# Patient Record
Sex: FEMALE | Race: WHITE | NOT HISPANIC OR LATINO | Employment: FULL TIME | URBAN - METROPOLITAN AREA
[De-identification: names, ages, dates, MRNs, and addresses within clinical notes are randomized per-mention and may not be internally consistent; named-entity substitution may affect disease eponyms.]

---

## 2017-05-03 ENCOUNTER — TRANSCRIBE ORDERS (OUTPATIENT)
Dept: ADMINISTRATIVE | Facility: HOSPITAL | Age: 52
End: 2017-05-03

## 2017-05-03 DIAGNOSIS — Z12.39 SCREENING FOR MALIGNANT NEOPLASM OF BREAST: Primary | ICD-10-CM

## 2017-05-17 DIAGNOSIS — Z12.31 ENCOUNTER FOR SCREENING MAMMOGRAM FOR MALIGNANT NEOPLASM OF BREAST: ICD-10-CM

## 2017-06-01 ENCOUNTER — HOSPITAL ENCOUNTER (OUTPATIENT)
Dept: MAMMOGRAPHY | Facility: HOSPITAL | Age: 52
Discharge: HOME | End: 2017-06-01
Payer: COMMERCIAL

## 2017-06-01 DIAGNOSIS — Z12.31 ENCOUNTER FOR SCREENING MAMMOGRAM FOR MALIGNANT NEOPLASM OF BREAST: ICD-10-CM

## 2017-06-01 PROCEDURE — G0202 SCR MAMMO BI INCL CAD: HCPCS

## 2017-06-09 ENCOUNTER — ALLSCRIPTS OFFICE VISIT (OUTPATIENT)
Dept: OTHER | Facility: OTHER | Age: 52
End: 2017-06-09

## 2018-01-12 VITALS
HEIGHT: 66 IN | WEIGHT: 187 LBS | BODY MASS INDEX: 30.05 KG/M2 | DIASTOLIC BLOOD PRESSURE: 72 MMHG | SYSTOLIC BLOOD PRESSURE: 130 MMHG

## 2018-03-28 ENCOUNTER — TELEPHONE (OUTPATIENT)
Dept: OBGYN CLINIC | Facility: CLINIC | Age: 53
End: 2018-03-28

## 2018-03-28 NOTE — TELEPHONE ENCOUNTER
Spoke with patient,  bleeding for the past few days   Last menses was 11/2016-advised evaluation, scheduled for 3/30/18

## 2018-03-30 ENCOUNTER — OFFICE VISIT (OUTPATIENT)
Dept: OBGYN CLINIC | Facility: CLINIC | Age: 53
End: 2018-03-30
Payer: COMMERCIAL

## 2018-03-30 VITALS
HEIGHT: 66 IN | DIASTOLIC BLOOD PRESSURE: 84 MMHG | BODY MASS INDEX: 29.77 KG/M2 | WEIGHT: 185.2 LBS | SYSTOLIC BLOOD PRESSURE: 130 MMHG

## 2018-03-30 DIAGNOSIS — N95.0 POSTMENOPAUSAL BLEEDING: Primary | ICD-10-CM

## 2018-03-30 PROBLEM — R92.30 DENSE BREAST TISSUE: Status: ACTIVE | Noted: 2017-06-09

## 2018-03-30 PROBLEM — R92.2 DENSE BREAST TISSUE: Status: ACTIVE | Noted: 2017-06-09

## 2018-03-30 PROCEDURE — 88305 TISSUE EXAM BY PATHOLOGIST: CPT | Performed by: PATHOLOGY

## 2018-03-30 PROCEDURE — 88342 IMHCHEM/IMCYTCHM 1ST ANTB: CPT | Performed by: PATHOLOGY

## 2018-03-30 PROCEDURE — 88341 IMHCHEM/IMCYTCHM EA ADD ANTB: CPT | Performed by: PATHOLOGY

## 2018-03-30 PROCEDURE — 58100 BIOPSY OF UTERUS LINING: CPT | Performed by: NURSE PRACTITIONER

## 2018-03-30 PROCEDURE — 99213 OFFICE O/P EST LOW 20 MIN: CPT | Performed by: NURSE PRACTITIONER

## 2018-03-30 RX ORDER — VITAMIN B COMPLEX
TABLET ORAL
COMMUNITY

## 2018-03-30 NOTE — PROGRESS NOTES
Endometrial biopsy  Date/Time: 3/30/2018 3:23 PM  Performed by: Truong Soto  Authorized by: Truong Soto     Consent:     Consent obtained:  Verbal and written    Consent given by:  Patient    Procedural risks discussed:  Bleeding, damage to other organs, failure rate, infection, possible continued pain and repeat procedure    Patient questions answered: yes      Patient agrees, verbalizes understanding, and wants to proceed: yes      Instructions and paperwork completed: yes    Indication:     Indications: Post-menopausal bleeding      Chronicity of post-menopausal bleeding:  New    Progression of post-menopausal bleeding:  Unable to specify  Pre-procedure:     Pre-procedure timeout performed: yes    Procedure:     Procedure: endometrial biopsy with Pipelle      A bivalve speculum was placed in the vagina: yes      Cervix cleaned and prepped: yes      The cervix was dilated: no      Uterus sounded: yes      Uterus sound depth (cm):  7 5    Specimen collected: specimen collected and sent to pathology      Patient tolerated procedure well with no complications: yes    Findings:     Uterus size:  Non-gravid    Cervix: normal      Adnexa: normal    Comments:      Endometrial biopsy was advised for postmenopausal bleeding  Risks/benefits were discussed at length and all questions were answered  Written and verbal consent were obtained  Allergies were confirmed and time out performed  The patient was positioned in lithotomy and spec was inserted  The cervix was cleansed with betadine solution x3  Endo pipelle was inserted and a large amount of tissue was obtained in three sweeps  Hemostasis was observed post-procedure and all instruments were removed  The patient tolerated well  She is aware of post-procedure symptoms to report  We will follow up when all results are available

## 2018-03-30 NOTE — ASSESSMENT & PLAN NOTE
This patient presents with c/o  bleeding  Discussed common causes of  bleeding incl thyroid dysfunction, intracavitary defect, hyperplasia and rarely malignancy  Advised TSH, pelvic US and EMB  The patient agrees to plan and EMB was collected today  Will advise with all results when available  Discussed possible recommendation for D&C/hysteroscopy  The patient verbalizes understanding and is aware of sx to report

## 2018-03-30 NOTE — PROGRESS NOTES
Assessment/Plan:    Postmenopausal bleeding  This patient presents with c/o  bleeding  Discussed common causes of  bleeding incl thyroid dysfunction, intracavitary defect, hyperplasia and rarely malignancy  Advised TSH, pelvic US and EMB  The patient agrees to plan and EMB was collected today  Will advise with all results when available  Discussed possible recommendation for D&C/hysteroscopy  The patient verbalizes understanding and is aware of sx to report  Diagnoses and all orders for this visit:    Postmenopausal bleeding  -     TSH, 3rd generation with T4 reflex; Future  -     US pelvis limited non OB; Future  -     Tissue Exam    Other orders  -     Coenzyme Q10 (COQ10) 100 MG CAPS; Take by mouth  -     hydrocortisone (WESTCORT) 0 2 % cream; Apply topically          Subjective:      Patient ID: Suri Khan is a 47 y o  female  This patient presents with c/o  bleeding  LMP was 11/2016  No bleeding/spotting since  Two days prior to onset of bleeding she noted cramping similar to past PMS symptoms  Bleeding is dark brown and mucosy  She denies pelvic pain, abn discharge or other acute gyn complaints  The following portions of the patient's history were reviewed and updated as appropriate: allergies, current medications, past family history, past medical history, past social history, past surgical history and problem list     Review of Systems   Constitutional: Negative  HENT: Negative  Eyes: Negative  Respiratory: Negative  Cardiovascular: Negative  Gastrointestinal: Negative  Endocrine: Negative  Genitourinary: Positive for vaginal bleeding  Negative for dysuria, hematuria, pelvic pain and vaginal discharge  Musculoskeletal: Negative  Skin: Negative  Allergic/Immunologic: Negative  Neurological: Negative  Hematological: Negative  Psychiatric/Behavioral: Negative            Objective:      /84 (BP Location: Left arm)   Ht 5' 5 5" (7 324 m)   Wt 84 kg (185 lb 3 2 oz)   BMI 30 35 kg/m²          Physical Exam   Constitutional: She is oriented to person, place, and time  She appears well-developed and well-nourished  HENT:   Head: Normocephalic and atraumatic  Eyes: Pupils are equal, round, and reactive to light  Neck: Normal range of motion  Pulmonary/Chest: Effort normal    Abdominal: Hernia confirmed negative in the right inguinal area and confirmed negative in the left inguinal area  Genitourinary: Rectum normal and uterus normal  There is no rash, tenderness, lesion or injury on the right labia  There is no rash, tenderness, lesion or injury on the left labia  Cervix exhibits no motion tenderness, no discharge and no friability  Right adnexum displays no mass, no tenderness and no fullness  Left adnexum displays no mass, no tenderness and no fullness  There is bleeding in the vagina  No erythema or tenderness in the vagina  No vaginal discharge found  Musculoskeletal: Normal range of motion  Lymphadenopathy:        Right: No inguinal adenopathy present  Left: No inguinal adenopathy present  Neurological: She is alert and oriented to person, place, and time  Skin: Skin is warm and dry  Psychiatric: She has a normal mood and affect   Her behavior is normal  Judgment and thought content normal

## 2018-03-31 LAB
T4 FREE SERPL-MCNC: 1 NG/DL (ref 0.8–1.8)
TSH SERPL-ACNC: 0.32 MIU/L

## 2018-04-04 ENCOUNTER — TELEPHONE (OUTPATIENT)
Dept: OBGYN CLINIC | Facility: CLINIC | Age: 53
End: 2018-04-04

## 2018-04-04 ENCOUNTER — HOSPITAL ENCOUNTER (OUTPATIENT)
Dept: RADIOLOGY | Facility: HOSPITAL | Age: 53
Discharge: HOME | End: 2018-04-04
Payer: COMMERCIAL

## 2018-04-04 DIAGNOSIS — N95.0 POSTMENOPAUSAL BLEEDING: ICD-10-CM

## 2018-04-04 PROCEDURE — 76856 US EXAM PELVIC COMPLETE: CPT

## 2018-04-04 PROCEDURE — 76830 TRANSVAGINAL US NON-OB: CPT

## 2018-04-04 NOTE — TELEPHONE ENCOUNTER
Spoke with pt - she is aware of b/w results and that she needs to follow up with her PCP  Advised once we have the EB results, she will be notified

## 2018-04-06 ENCOUNTER — HOSPITAL ENCOUNTER (OUTPATIENT)
Dept: RADIOLOGY | Facility: HOSPITAL | Age: 53
Discharge: HOME | End: 2018-04-06
Payer: COMMERCIAL

## 2018-04-06 ENCOUNTER — OFFICE VISIT (OUTPATIENT)
Dept: FAMILY MEDICINE CLINIC | Facility: CLINIC | Age: 53
End: 2018-04-06
Payer: COMMERCIAL

## 2018-04-06 VITALS
HEIGHT: 65 IN | DIASTOLIC BLOOD PRESSURE: 78 MMHG | RESPIRATION RATE: 16 BRPM | SYSTOLIC BLOOD PRESSURE: 124 MMHG | WEIGHT: 186 LBS | BODY MASS INDEX: 30.99 KG/M2 | HEART RATE: 74 BPM

## 2018-04-06 DIAGNOSIS — N95.0 POSTMENOPAUSAL BLEEDING: ICD-10-CM

## 2018-04-06 DIAGNOSIS — E03.8 TSH DEFICIENCY: Primary | ICD-10-CM

## 2018-04-06 DIAGNOSIS — E03.8 TSH DEFICIENCY: ICD-10-CM

## 2018-04-06 PROCEDURE — 99214 OFFICE O/P EST MOD 30 MIN: CPT | Performed by: FAMILY MEDICINE

## 2018-04-06 PROCEDURE — 76536 US EXAM OF HEAD AND NECK: CPT

## 2018-04-06 PROCEDURE — 3008F BODY MASS INDEX DOCD: CPT | Performed by: FAMILY MEDICINE

## 2018-04-06 NOTE — PROGRESS NOTES
Assessment/Plan:    Elevated TSH  I will send patient to see Dr Joce Edwards the endocrinologist  In the meantime she will schedule to get an ultrasound of her thyroid because he will need that to see if she has any nodules    Postmenopausal bleeding  Being followed by gyn  Her biopsy is negative and she is waiting for her ultrasound results hopefully that is negative as well  Most likely her thyroid is what caused her bleeding  Recheck in 1 year or as needed  Next colonoscopy July 2025  Next mammogram due on or after 6/1/2018  Patient follows for her Pap with her gyn    Subjective:   Marlon Wilkes is a 47 y  o female  Chief Complaint   Patient presents with    Follow-up     BW     Patient's was menopausal officially November 2017  At that time it had been 1 year with no  On March 27th patient had postmenopausal bleeding he  Prior to that she had all the symptoms like she was going to have another  Such as mucus discharge nipple tenderness breast tenderness and feeling like she was IV lady in  She went to her gyn and had a thyroid test done that was found to be overactive  She had endometrial biopsy and ultrasound of her uterus, both of these she does not know the results yet  Thyroid disease runs in her family  Her mother had Hashimoto's thyroiditis and her grandmother was overactive  Patient is here to find what she should do regarding her thyroid    Her gyn takes care of her Paps and mammograms        Past Medical History:   Diagnosis Date    Ganglion cyst of wrist, right      Social History   Substance Use Topics    Smoking status: Never Smoker    Smokeless tobacco: Never Used    Alcohol use Yes      Comment: Occational      Family History   Problem Relation Age of Onset    Hypertension Mother     Throat cancer Mother     Stroke Mother     Substance Abuse Mother     Esophageal cancer Father     Stroke Father     Ovarian cancer Sister     Hypertension Brother     Hypertension Maternal Grandmother     Hypertension Maternal Grandfather     Stroke Maternal Grandfather     No Known Problems Daughter     Other Son      racing heart rhythm    Emphysema Paternal Grandmother     Alzheimer's disease Paternal Grandfather     Heart murmur Son     Mental illness Neg Hx        MEDICATIONS REVIEWED AND UPDATED    Rest Of 10 Point Review Of System Negative    Objective:  Patient had blood work through her employer 9/14/2017  It included Chem 20 CBC uric acid cholesterol urine lead levels GFR and liver test all of which are totally normal   Patient's biopsy results were negative  Patient's TSH is depressed but her total level of thyroid hormone and her body is normal  Patient's ultrasound has not been read at this time but it has only been done a day ago    Vitals:    04/06/18 0943   BP: 124/78   Pulse: 74   Resp: 16     Body mass index is 30 72 kg/m²      Physical Exam  Constitutional  Appears healthy, Looks well, Appearance consistent with age    Mental Status  Alert, Oriented, Cooperative, Memory function normal , clean, and reasonable    Neck  No neck mass, No thyromegaly, Good carotid upstrokes bilaterally, trachea midline positive click    Respiratory  Breath sounds normal, No rales, No rhonchi, No wheezing, normal palpation    Cardiac   Regular rhythm without ectopy or murmur no S3-S4, no heave lift or thrill to palpation    Vascular  No leg edema, No pedal edema    Muscular skeletal  No clubbing cyanosis , muscle tone normal    Skin  No appreciable rashes or abnormal appearing lesions

## 2018-04-06 NOTE — PATIENT INSTRUCTIONS
Elevated TSH  I will send patient to see Dr Yamileth Verdugo the endocrinologist  In the meantime she will schedule to get an ultrasound of her thyroid because he will need that to see if she has any nodules    Postmenopausal bleeding  Being followed by gyn  Her biopsy is negative and she is waiting for her ultrasound results hopefully that is negative as well  Most likely her thyroid is what caused her bleeding        Recheck in 1 year or as needed  Next colonoscopy July 2025  Next mammogram due on or after 6/1/2018  Patient follows for her Pap with her gyn

## 2018-04-10 ENCOUNTER — TELEPHONE (OUTPATIENT)
Dept: OBGYN CLINIC | Facility: CLINIC | Age: 53
End: 2018-04-10

## 2018-04-10 NOTE — TELEPHONE ENCOUNTER
----- Message from Alvaro Gonzalez sent at 4/10/2018  5:40 PM EDT -----  Pelvic US is benign   Please notify patient   Aimee Lyn to continue to observe if bleeding has stopped   If it has continued please let me know

## 2018-06-01 DIAGNOSIS — Z12.31 ENCOUNTER FOR SCREENING MAMMOGRAM FOR MALIGNANT NEOPLASM OF BREAST: ICD-10-CM

## 2018-06-01 DIAGNOSIS — R92.2 INCONCLUSIVE MAMMOGRAM: ICD-10-CM

## 2018-06-09 ENCOUNTER — HOSPITAL ENCOUNTER (OUTPATIENT)
Dept: MAMMOGRAPHY | Facility: HOSPITAL | Age: 53
Discharge: HOME | End: 2018-06-09
Payer: COMMERCIAL

## 2018-06-09 DIAGNOSIS — Z12.31 ENCOUNTER FOR SCREENING MAMMOGRAM FOR MALIGNANT NEOPLASM OF BREAST: ICD-10-CM

## 2018-06-09 DIAGNOSIS — R92.2 INCONCLUSIVE MAMMOGRAM: ICD-10-CM

## 2018-06-09 PROCEDURE — 77067 SCR MAMMO BI INCL CAD: CPT

## 2018-06-09 PROCEDURE — 77063 BREAST TOMOSYNTHESIS BI: CPT

## 2018-06-13 ENCOUNTER — ANNUAL EXAM (OUTPATIENT)
Dept: OBGYN CLINIC | Facility: CLINIC | Age: 53
End: 2018-06-13
Payer: COMMERCIAL

## 2018-06-13 VITALS
BODY MASS INDEX: 30.16 KG/M2 | SYSTOLIC BLOOD PRESSURE: 130 MMHG | WEIGHT: 181 LBS | HEIGHT: 65 IN | DIASTOLIC BLOOD PRESSURE: 70 MMHG

## 2018-06-13 DIAGNOSIS — Z01.419 ENCNTR FOR GYN EXAM (GENERAL) (ROUTINE) W/O ABN FINDINGS: Primary | ICD-10-CM

## 2018-06-13 DIAGNOSIS — Z12.31 ENCOUNTER FOR SCREENING MAMMOGRAM FOR MALIGNANT NEOPLASM OF BREAST: ICD-10-CM

## 2018-06-13 DIAGNOSIS — Z11.51 SCREENING FOR HUMAN PAPILLOMAVIRUS (HPV): ICD-10-CM

## 2018-06-13 DIAGNOSIS — R92.2 DENSE BREAST TISSUE: ICD-10-CM

## 2018-06-13 PROCEDURE — 87624 HPV HI-RISK TYP POOLED RSLT: CPT | Performed by: OBSTETRICS & GYNECOLOGY

## 2018-06-13 PROCEDURE — G0145 SCR C/V CYTO,THINLAYER,RESCR: HCPCS | Performed by: OBSTETRICS & GYNECOLOGY

## 2018-06-13 PROCEDURE — 99396 PREV VISIT EST AGE 40-64: CPT | Performed by: OBSTETRICS & GYNECOLOGY

## 2018-06-13 NOTE — PROGRESS NOTES
Malachimary annedana Pod   1965    CC:  Yearly exam    S:  47 y o  female here for yearly exam  She is postmenopausal and has had no vaginal bleeding  She denies vaginal discharge, itching, odor or dryness  She is sexually active  Aware mammogram done last year    Last Pap 2/10/2014 normal/negative HPV  Last Mammo 6/9/2018 - [facility]-1  Last Colonoscopy 7/16/2015 10 year intervals      Current Outpatient Prescriptions:     Coenzyme Q10 (COQ10) 100 MG CAPS, Take by mouth, Disp: , Rfl:     hydrocortisone (WESTCORT) 0 2 % cream, Apply topically, Disp: , Rfl:   Social History     Social History    Marital status: /Civil Union     Spouse name: N/A    Number of children: N/A    Years of education: N/A     Occupational History    Not on file       Social History Main Topics    Smoking status: Never Smoker    Smokeless tobacco: Never Used    Alcohol use Yes      Comment: beer 2 x wk, socially    Drug use: No    Sexual activity: Yes     Partners: Male     Birth control/ protection: Post-menopausal      Comment:      Other Topics Concern    Not on file     Social History Narrative    Activities: bicycling    Daily coffee consumption: 2 cp a day    Exercise: walking    Exercises moderately less than 3 times a week     Family History   Problem Relation Age of Onset    Hypertension Mother     Throat cancer Mother      laryngeal    Stroke Mother      syndrome    Substance Abuse Mother     Thyroid disease Mother     Esophageal cancer Father     Stroke Father      syndrome    Ovarian cancer Sister     Hypertension Brother     Hypertension Maternal Grandmother     Thyroid disease Maternal Grandmother     Hypertension Maternal Grandfather     Stroke Maternal Grandfather      syndrome    No Known Problems Daughter     Other Son      racing heart rhythm    Emphysema Paternal Grandmother     Alzheimer's disease Paternal Grandfather     Heart murmur Son     Mental illness Neg Hx      Past Medical History:   Diagnosis Date    Blood type, Rh negative     Ganglion cyst of wrist, right     Overweight         O:  Blood pressure 130/70, height 5' 5" (1 651 m), weight 82 1 kg (181 lb)  Patient appears well and is not in distress  Neck is supple without masses  Breasts are symmetrical without mass, tenderness, nipple discharge, skin changes or adenopathy  Abdomen is soft and nontender without masses  External genitals are normal without lesions or rashes  Vagina is normal without discharge or bleeding  Cervix is normal without discharge or lesion  Uterus is normal, mobile, nontender without palpable mass  Adnexa are normal, nontender, without palpable mass  A:  Yearly exam      P:  RTO one year for yearly exam or sooner as needed  Pap with HPV done

## 2018-06-14 LAB — HPV RRNA GENITAL QL NAA+PROBE: NORMAL

## 2018-06-19 LAB
LAB AP GYN PRIMARY INTERPRETATION: NORMAL
Lab: NORMAL

## 2018-10-01 ENCOUNTER — OFFICE VISIT (OUTPATIENT)
Dept: ENDOCRINOLOGY | Facility: HOSPITAL | Age: 53
End: 2018-10-01
Payer: COMMERCIAL

## 2018-10-01 VITALS
HEIGHT: 65 IN | DIASTOLIC BLOOD PRESSURE: 84 MMHG | WEIGHT: 182 LBS | BODY MASS INDEX: 30.32 KG/M2 | SYSTOLIC BLOOD PRESSURE: 150 MMHG | HEART RATE: 82 BPM

## 2018-10-01 DIAGNOSIS — R79.89 LOW TSH LEVEL: ICD-10-CM

## 2018-10-01 DIAGNOSIS — E04.2 MULTIPLE THYROID NODULES: Primary | ICD-10-CM

## 2018-10-01 PROCEDURE — 99244 OFF/OP CNSLTJ NEW/EST MOD 40: CPT | Performed by: INTERNAL MEDICINE

## 2018-10-01 NOTE — PROGRESS NOTES
10/1/2018    Assessment/Plan      Diagnoses and all orders for this visit:    Multiple thyroid nodules  -     TSH, 3rd generation Lab Collect; Future  -     T3, free; Future  -     T4, free Lab Collect; Future  -     Thyroid stimulating immunoglobulin Lab Collect; Future  -     Anti-microsomal antibody Lab Collect; Future    Low TSH level  -     TSH, 3rd generation Lab Collect; Future  -     T3, free; Future  -     T4, free Lab Collect; Future  -     Thyroid stimulating immunoglobulin Lab Collect; Future  -     Anti-microsomal antibody Lab Collect; Future        Assessment/Plan:  1  Thyroid nodule: This was discovered during evaluation for low TSH  We need to repeat thyroid function studies as ordered above today  We will call her with the results  Discussed with the patient that if the TSH is still low, we will need to pursue an uptake and scan  We will call with the results and plan in this regard  2  Low TSH:   Clinically the patient appears euthyroid  I have repeated a TSH, free T3, free T4 along with TSI and TPO antibodies  If the TSH remains low, will need to pursue uptake and scan in the setting of thyroid nodules  Follow-up in 6 months  CC:   Thyroid nodule and low TSH    History of Present Illness     HPI: Julienne Parish is a 47y o  year old female presents for evaluation of low TSH level that was discovered during workup for abnormal postmenopausal bleeding back in March of 2018  She underwent GYN evaluation that did not disclose any concerns so she was sent to her PCP for evaluation of a low TSH level discovered  The thyroid ultrasound showed some thyroid nodules  She does report a family history of her grandmother with hyperthyroidism that receive radioactive iodine treatment as well as her mother who has Hashimoto's hypothyroidism  She presents today for consultation  Prior to March, she denies any known steroid treatment    She does have a history of eczema uses topical steroids but now systemic steroids  She denies any iodinated contrast exposure  She denies known illness prior to these labs that she remembers  She denies any neck compressive symptoms  She denies any family history of thyroid cancer or personal history of ionizing head or neck radiation  She has had no further problems regarding abnormal bleeding  Overall she feels well and denies any palpitations, tachycardia, heat intolerance, cold intolerance, tremor, shakiness, anxiety  Review of Systems   Constitutional: Negative for fatigue  HENT: Negative for trouble swallowing and voice change  Eyes: Negative for visual disturbance  Respiratory: Negative for shortness of breath  Cardiovascular: Negative for palpitations and leg swelling  Gastrointestinal: Negative for abdominal pain, nausea and vomiting  Endocrine: Negative for cold intolerance, heat intolerance, polydipsia and polyuria  Musculoskeletal: Negative for arthralgias and myalgias  Skin: Negative for rash  Neurological: Negative for dizziness, tremors and weakness  Hematological: Negative for adenopathy  Psychiatric/Behavioral: Negative for agitation and confusion         Historical Information   Past Medical History:   Diagnosis Date    Blood type, Rh negative     Ganglion cyst of wrist, right     Overweight      Past Surgical History:   Procedure Laterality Date     SECTION, LOW TRANSVERSE      CHROMOSOME ANALYSIS, PRODUCTS OF CONCEPTION (HISTORICAL)      surgical treatment of missed     INDUCED       by dilation and evacuation    WISDOM TOOTH EXTRACTION       Social History   History   Alcohol Use    Yes     Comment: beer 2 x wk, socially     History   Drug Use No     History   Smoking Status    Never Smoker   Smokeless Tobacco    Never Used     Family History:   Family History   Problem Relation Age of Onset    Hypertension Mother     Throat cancer Mother         laryngeal    Stroke Mother syndrome    Substance Abuse Mother     Thyroid disease Mother     Esophageal cancer Father     Stroke Father         syndrome    Ovarian cancer Sister     Hypertension Brother     Hypertension Maternal Grandmother     Thyroid disease Maternal Grandmother     Hypertension Maternal Grandfather     Stroke Maternal Grandfather         syndrome    No Known Problems Daughter     Other Son         racing heart rhythm    Emphysema Paternal Grandmother     Alzheimer's disease Paternal Grandfather     Heart murmur Son     Mental illness Neg Hx        Meds/Allergies   Current Outpatient Prescriptions   Medication Sig Dispense Refill    Coenzyme Q10 (COQ10) 100 MG CAPS Take by mouth      hydrocortisone (WESTCORT) 0 2 % cream Apply topically       No current facility-administered medications for this visit  No Known Allergies    Objective   Vitals: Blood pressure 150/84, pulse 82, height 5' 5 25" (1 657 m), weight 82 6 kg (182 lb)  Invasive Devices          No matching active lines, drains, or airways          Physical Exam   Constitutional: She is oriented to person, place, and time  She appears well-developed and well-nourished  No distress  HENT:   Head: Normocephalic and atraumatic  Eyes: Pupils are equal, round, and reactive to light  Conjunctivae are normal    Neck: Normal range of motion  Neck supple  No thyromegaly present  Cardiovascular: Normal rate and regular rhythm  No murmur heard  Pulmonary/Chest: Effort normal and breath sounds normal  No respiratory distress  Abdominal: Soft  Bowel sounds are normal  She exhibits no distension  Musculoskeletal: Normal range of motion  She exhibits no edema  Lymphadenopathy:     She has no cervical adenopathy  Neurological: She is alert and oriented to person, place, and time  She exhibits normal muscle tone  Skin: Skin is warm and dry  No rash noted  She is not diaphoretic  Psychiatric: She has a normal mood and affect   Her behavior is normal        The history was obtained from the review of the chart and from the patient  Lab Results:      Recent Results (from the past 45055 hour(s))   Tissue Exam    Collection Time: 03/30/18 10:59 AM   Result Value Ref Range    Case Report       Surgical Pathology Report                         Case: [id number]                                   Authorizing Provider:  HEYDI Raza       Collected:           03/30/2018 1059              Ordering Location:     [facility]      Received:            03/30/2018 1059                                     [facility]                                                                               [place]                                                                    Pathologist:           Jessie Godfrey MD                                                                Specimen:    Endometrium, Endometrial Biopsy                                                            Final Diagnosis       A  Endometrium (biopsy):     - Inactive endometrium with features of breakdown       - No malignancy identified  Microscopic Description       - Immunohistochemical studies (with appropriate controls) demonstrate:     - P53 with wild-type expression  P16 with patchy expression  Note       Interpretation performed at [place] [address]  Additional Information       All controls performed with the immunohistochemical stains reported above reacted appropriately  These tests were developed and their performance characteristics determined by [facility] or [facility]  They may not be cleared or approved by the [place]  Food and Drug Administration  The FDA has determined that such clearance or approval is not necessary  These tests are used for clinical purposes  They should not be regarded as investigational or for research   This laboratory has been approved by CLIA 88, designated as a high-complexity laboratory and is qualified to perform these tests  Gross Description       A  The specimen is received in formalin, labeled with the patient's name and hospital number, and is designated "endometrial biopsy  The specimen consists of multiple tan to red-brown, focally mucinous soft tissue and hemorrhagic tissue fragments measuring in aggregate 2 0 x 1 4 x 0 1 cm  The specimen is entirely submitted in 1 cassette  Note: The estimated total formalin fixation time based upon information provided by the submitting clinician and the standard processing schedule is under 72 hours       Mili      Clinical Information Postmenopausal bleeding    T4, free    Collection Time: 03/30/18 11:37 AM   Result Value Ref Range    Free t4 1 0 0 8 - 1 8 ng/dL   TSH, 3rd generation with T4 reflex    Collection Time: 03/30/18 11:37 AM   Result Value Ref Range    SL AMB TSH W/ REFLEX TO FREE T4 0 32 (L) mIU/L   Liquid-based pap, screening    Collection Time: 06/13/18  1:45 PM   Result Value Ref Range    Case Report       Gynecologic Cytology Report                       Case: [id number]                                  Authorizing Provider:  Yves Severin, MD     Collected:           06/13/2018 1345              Ordering Location:     [facility] Juanjose:            06/13/2018 1345                                     [facility]                                                                               [place]                                                                    First Screen:          Racheal Martinez, CT                                                       Rescreen:              Marilyn Barrientos                                                                  Specimen:    LIQUID-BASED PAP, SCREENING, Cervix                                                        Primary Interpretation Negative for intraepithelial lesion or malignancy     Specimen Adequacy       Satisfactory for evaluation  Endocervical/transformation zone component present  High Risk HPV Result       HPV, High Risk: HPV NEG, HPV16 NEG, HPV18 NEG      Other High Risk HPV Negative, HPV 16 Negative, HPV 18 Negative  HPV types: 16,18,31,33,35,39,45,51,52,56,58,59,66 and 68 DNA are undetectable or below the pre-set threshold  [employer]s FDA approved Janet 4800 is utilized with strict adherence to the s instruction  manual to test for the presence of High-Risk HPV DNA, as well as HPV 16 and HPV 18  This instrument  has been validated by our laboratory and/or by the   A negative result does not preclude the presence of HPV infection because results depend on adequate  specimen collection, absence of inhibitors and sufficient DNA to be detected  Additionally, HPV negative  results are not intended to prevent women from proceeding to colposcopy if clinically warranted  Positive HPV test results indicate the presence of any one or more of the high risk types, but since patients  are often co-infected with low-risk types it does not rule out the presence of low-risk  types in patients  with mixed infections  Additional Information       [employer]'s FDA approved ,  and ThinPrep Imaging System are utilized with strict adherence to the 's instruction manual to prepare gynecologic and non-gynecologic cytology specimens for the production of ThinPrep slides as well as for gynecologic ThinPrep imaging  These processes have been validated by our laboratory and/or by the   The Pap test is not a diagnostic procedure and should not be used as the sole means to detect cervical cancer  It is only a screening procedure to aid in the detection of cervical cancer and its precursors  Both false-negative and false-positive results have been experienced   Your patient's test result should be interpreted in this context together with the history and clinical findings  LMP     HPV High Risk    Collection Time: 06/13/18  1:45 PM   Result Value Ref Range    HPV, High Risk HPV NEG, HPV16 NEG, HPV18 NEG HPV NEG, HPV16 NEG, HPV18 NEG     04/06/2018:  THYROID ULTRASOUND     INDICATION:    E03 8: Other specified hypothyroidism  Diminished TSH       COMPARISON:  None     TECHNIQUE:   Ultrasound of the thyroid was performed with a high frequency linear transducer in transverse and sagittal planes including volumetric imaging sweeps as well as traditional still imaging technique      FINDINGS:  Thyroid parenchyma is diffusely heterogeneous in echotexture      Right lobe:  4 7 x 1 6 x 2 0 cm  Left lobe:  4 5 x 1 0 x 1 1 cm  Isthmus:  0 2 cm      Nodule #1  RIGHT midgland nodule measuring 1 2 x 0 7 x 0 9 cm  No priors for comparison  COMPOSITION:  2 points, solid or almost completely solid   ECHOGENICITY:  2 points, hypoechoic  SHAPE:  0 points, wider-than-tall  MARGIN: 0 points, smooth  ECHOGENIC FOCI:  0 points, none or large comet-tail artifacts  TI-RADS Classification: TR 4 (4-6 points), Moderately suspicious  FNA if > 1 5 cm  Follow if > 1cm  Additional tiny bilateral nodules measure approximately 5 mm in size and smaller     IMPRESSION:  Heterogeneous gland with multiple nodules, the largest of which measures 1 2 cm, in the right mid gland  No nodule meets current ACR criteria for requiring biopsy but followup ultrasound is recommended in 2 years             Reference: ACR Thyroid Imaging, Reporting and Data System (TI-RADS): White Paper of the [employer]   J AM Raj Radiol 2904;[id number]  (additional recommendations based on American Thyroid Association 2015 guidelines )    Future Appointments  Date Time Provider Jolanta Harmon   6/19/2019 1:20 PM Evaristo Cabrera MD [address] [place] [address]

## 2018-10-01 NOTE — LETTER
October 1, 2018     Felisha Ferrell [address] [address]    Patient: Vivian Braga   YOB: 1965   Date of Visit: 10/1/2018       Dear Dr Celia Aceves: Thank you for referring Jose Nayak to me for evaluation  Below are my notes for this consultation  If you have questions, please do not hesitate to call me  I look forward to following your patient along with you  Sincerely,        Mirian Rich DO        CC: No Recipients  Mirian Rich DO  10/1/2018  8:04 AM  Sign at close encounter  10/1/2018    Assessment/Plan      Diagnoses and all orders for this visit:    Multiple thyroid nodules  -     TSH, 3rd generation Lab Collect; Future  -     T3, free; Future  -     T4, free Lab Collect; Future  -     Thyroid stimulating immunoglobulin Lab Collect; Future  -     Anti-microsomal antibody Lab Collect; Future    Low TSH level  -     TSH, 3rd generation Lab Collect; Future  -     T3, free; Future  -     T4, free Lab Collect; Future  -     Thyroid stimulating immunoglobulin Lab Collect; Future  -     Anti-microsomal antibody Lab Collect; Future        Assessment/Plan:  1  Thyroid nodule: This was discovered during evaluation for low TSH  We need to repeat thyroid function studies as ordered above today  We will call her with the results  Discussed with the patient that if the TSH is still low, we will need to pursue an uptake and scan  We will call with the results and plan in this regard  2  Low TSH:   Clinically the patient appears euthyroid  I have repeated a TSH, free T3, free T4 along with TSI and TPO antibodies  If the TSH remains low, will need to pursue uptake and scan in the setting of thyroid nodules  Follow-up in 6 months        CC:   Thyroid nodule and low TSH    History of Present Illness     HPI: Vivian Braga is a 47y o  year old female presents for evaluation of low TSH level that was discovered during workup for abnormal postmenopausal bleeding back in 2018  She underwent GYN evaluation that did not disclose any concerns so she was sent to her PCP for evaluation of a low TSH level discovered  The thyroid ultrasound showed some thyroid nodules  She does report a family history of her grandmother with hyperthyroidism that receive radioactive iodine treatment as well as her mother who has Hashimoto's hypothyroidism  She presents today for consultation  Prior to March, she denies any known steroid treatment  She does have a history of eczema uses topical steroids but now systemic steroids  She denies any iodinated contrast exposure  She denies known illness prior to these labs that she remembers  She denies any neck compressive symptoms  She denies any family history of thyroid cancer or personal history of ionizing head or neck radiation  She has had no further problems regarding abnormal bleeding  Overall she feels well and denies any palpitations, tachycardia, heat intolerance, cold intolerance, tremor, shakiness, anxiety  Review of Systems   Constitutional: Negative for fatigue  HENT: Negative for trouble swallowing and voice change  Eyes: Negative for visual disturbance  Respiratory: Negative for shortness of breath  Cardiovascular: Negative for palpitations and leg swelling  Gastrointestinal: Negative for abdominal pain, nausea and vomiting  Endocrine: Negative for cold intolerance, heat intolerance, polydipsia and polyuria  Musculoskeletal: Negative for arthralgias and myalgias  Skin: Negative for rash  Neurological: Negative for dizziness, tremors and weakness  Hematological: Negative for adenopathy  Psychiatric/Behavioral: Negative for agitation and confusion         Historical Information   Past Medical History:   Diagnosis Date    Blood type, Rh negative     Ganglion cyst of wrist, right     Overweight      Past Surgical History:   Procedure Laterality Date     SECTION, LOW TRANSVERSE      CHROMOSOME ANALYSIS, PRODUCTS OF CONCEPTION (HISTORICAL)      surgical treatment of missed     INDUCED       by dilation and evacuation    WISDOM TOOTH EXTRACTION       Social History   History   Alcohol Use    Yes     Comment: beer 2 x wk, socially     History   Drug Use No     History   Smoking Status    Never Smoker   Smokeless Tobacco    Never Used     Family History:   Family History   Problem Relation Age of Onset    Hypertension Mother     Throat cancer Mother         laryngeal    Stroke Mother         syndrome    Substance Abuse Mother     Thyroid disease Mother     Esophageal cancer Father     Stroke Father         syndrome    Ovarian cancer Sister     Hypertension Brother     Hypertension Maternal Grandmother     Thyroid disease Maternal Grandmother     Hypertension Maternal Grandfather     Stroke Maternal Grandfather         syndrome    No Known Problems Daughter     Other Son         racing heart rhythm    Emphysema Paternal Grandmother     Alzheimer's disease Paternal Grandfather     Heart murmur Son     Mental illness Neg Hx        Meds/Allergies   Current Outpatient Prescriptions   Medication Sig Dispense Refill    Coenzyme Q10 (COQ10) 100 MG CAPS Take by mouth      hydrocortisone (WESTCORT) 0 2 % cream Apply topically       No current facility-administered medications for this visit  No Known Allergies    Objective   Vitals: Blood pressure 150/84, pulse 82, height 5' 5 25" (1 657 m), weight 82 6 kg (182 lb)  Invasive Devices          No matching active lines, drains, or airways          Physical Exam   Constitutional: She is oriented to person, place, and time  She appears well-developed and well-nourished  No distress  HENT:   Head: Normocephalic and atraumatic  Eyes: Pupils are equal, round, and reactive to light  Conjunctivae are normal    Neck: Normal range of motion  Neck supple  No thyromegaly present  Cardiovascular: Normal rate and regular rhythm  No murmur heard  Pulmonary/Chest: Effort normal and breath sounds normal  No respiratory distress  Abdominal: Soft  Bowel sounds are normal  She exhibits no distension  Musculoskeletal: Normal range of motion  She exhibits no edema  Lymphadenopathy:     She has no cervical adenopathy  Neurological: She is alert and oriented to person, place, and time  She exhibits normal muscle tone  Skin: Skin is warm and dry  No rash noted  She is not diaphoretic  Psychiatric: She has a normal mood and affect  Her behavior is normal        The history was obtained from the review of the chart and from the patient  Lab Results:      Recent Results (from the past 79452 hour(s))   Tissue Exam    Collection Time: 03/30/18 10:59 AM   Result Value Ref Range    Case Report       Surgical Pathology Report                         Case: [id number]                                   Authorizing Provider:  HEYDI Rosario       Collected:           03/30/2018 1059              Ordering Location:     [facility]      Received:            03/30/2018 1059                                     [facility]                                                                               [place]                                                                    Pathologist:           Tin Boothe MD                                                                Specimen:    Endometrium, Endometrial Biopsy                                                            Final Diagnosis       A  Endometrium (biopsy):     - Inactive endometrium with features of breakdown       - No malignancy identified  Microscopic Description       - Immunohistochemical studies (with appropriate controls) demonstrate:     - P53 with wild-type expression  P16 with patchy expression         Note       Interpretation performed at [facility], [address] [place] [address] [id number]  Additional Information       All controls performed with the immunohistochemical stains reported above reacted appropriately  These tests were developed and their performance characteristics determined by [facility] or [facility]  They may not be cleared or approved by the [place]  Food and Drug Administration  The FDA has determined that such clearance or approval is not necessary  These tests are used for clinical purposes  They should not be regarded as investigational or for research  This laboratory has been approved by [place] [address], designated as a high-complexity laboratory and is qualified to perform these tests  Gross Description       A  The specimen is received in formalin, labeled with the patient's name and hospital number, and is designated "endometrial biopsy  The specimen consists of multiple tan to red-brown, focally mucinous soft tissue and hemorrhagic tissue fragments measuring in aggregate 2 0 x 1 4 x 0 1 cm  The specimen is entirely submitted in 1 cassette  Note: The estimated total formalin fixation time based upon information provided by the submitting clinician and the standard processing schedule is under 72 hours       Mili      Clinical Information Postmenopausal bleeding    T4, free    Collection Time: 03/30/18 11:37 AM   Result Value Ref Range    Free t4 1 0 0 8 - 1 8 ng/dL   TSH, 3rd generation with T4 reflex    Collection Time: 03/30/18 11:37 AM   Result Value Ref Range    SL AMB TSH W/ REFLEX TO FREE T4 0 32 (L) mIU/L   Liquid-based pap, screening    Collection Time: 06/13/18  1:45 PM   Result Value Ref Range    Case Report       Gynecologic Cytology Report                       Case: [id number]                                  Authorizing Provider:  Cami Bruno MD     Collected:           06/13/2018 8296              Ordering Location:     [facility]      Received:            06/13/2018 [facility]                                                                               [place]                                                                    First Screen:          TARIK Tipton                                                       Rescreen:              [place]                                                                  Specimen:    LIQUID-BASED PAP, SCREENING, Cervix                                                        Primary Interpretation Negative for intraepithelial lesion or malignancy     Specimen Adequacy       Satisfactory for evaluation  Endocervical/transformation zone component present  High Risk HPV Result       HPV, High Risk: HPV NEG, HPV16 NEG, HPV18 NEG      Other High Risk HPV Negative, HPV 16 Negative, HPV 18 Negative  HPV types: 16,18,31,33,35,39,45,51,52,56,58,59,66 and 68 DNA are undetectable or below the pre-set threshold  [employer]s FDA approved Janet 4800 is utilized with strict adherence to the s instruction  manual to test for the presence of High-Risk HPV DNA, as well as HPV 16 and HPV 18  This instrument  has been validated by our laboratory and/or by the   A negative result does not preclude the presence of HPV infection because results depend on adequate  specimen collection, absence of inhibitors and sufficient DNA to be detected  Additionally, HPV negative  results are not intended to prevent women from proceeding to colposcopy if clinically warranted  Positive HPV test results indicate the presence of any one or more of the high risk types, but since patients  are often co-infected with low-risk types it does not rule out the presence of low-risk  types in patients  with mixed infections      Additional Information       [employer]'s FDA approved ,  and ThinPrep Imaging System are utilized with strict adherence to the 's instruction manual to prepare gynecologic and non-gynecologic cytology specimens for the production of ThinPrep slides as well as for gynecologic ThinPrep imaging  These processes have been validated by our laboratory and/or by the   The Pap test is not a diagnostic procedure and should not be used as the sole means to detect cervical cancer  It is only a screening procedure to aid in the detection of cervical cancer and its precursors  Both false-negative and false-positive results have been experienced  Your patient's test result should be interpreted in this context together with the history and clinical findings  LMP     HPV High Risk    Collection Time: 06/13/18  1:45 PM   Result Value Ref Range    HPV, High Risk HPV NEG, HPV16 NEG, HPV18 NEG HPV NEG, HPV16 NEG, HPV18 NEG     04/06/2018:  THYROID ULTRASOUND     INDICATION:    E03 8: Other specified hypothyroidism  Diminished TSH       COMPARISON:  None     TECHNIQUE:   Ultrasound of the thyroid was performed with a high frequency linear transducer in transverse and sagittal planes including volumetric imaging sweeps as well as traditional still imaging technique      FINDINGS:  Thyroid parenchyma is diffusely heterogeneous in echotexture      Right lobe:  4 7 x 1 6 x 2 0 cm  Left lobe:  4 5 x 1 0 x 1 1 cm  Isthmus:  0 2 cm      Nodule #1  RIGHT midgland nodule measuring 1 2 x 0 7 x 0 9 cm  No priors for comparison  COMPOSITION:  2 points, solid or almost completely solid   ECHOGENICITY:  2 points, hypoechoic  SHAPE:  0 points, wider-than-tall  MARGIN: 0 points, smooth  ECHOGENIC FOCI:  0 points, none or large comet-tail artifacts  TI-RADS Classification: TR 4 (4-6 points), Moderately suspicious  FNA if > 1 5 cm  Follow if > 1cm  Additional tiny bilateral nodules measure approximately 5 mm in size and smaller     IMPRESSION:  Heterogeneous gland with multiple nodules, the largest of which measures 1 2 cm, in the right mid gland    No nodule meets current ACR criteria for requiring biopsy but followup ultrasound is recommended in 2 years             Reference: ACR Thyroid Imaging, Reporting and Data System (TI-RADS): White Paper of the [employer]   J AM Raj Radiol 4415;96:[id number]  (additional recommendations based on American Thyroid Association 2015 guidelines )    Future Appointments  Date Time Provider Jolanta Harmon   6/19/2019 1:20 PM Noemi Umaña MD [address] [place] [address]

## 2018-10-09 DIAGNOSIS — E04.2 MULTIPLE THYROID NODULES: Primary | ICD-10-CM

## 2018-10-09 LAB
T3FREE SERPL-MCNC: 2.8 PG/ML (ref 2.3–4.2)
T4 FREE SERPL-MCNC: 1 NG/DL (ref 0.8–1.8)
THYROPEROXIDASE AB SERPL-ACNC: 1 IU/ML
TSH SERPL-ACNC: 1.29 MIU/L
TSI SER-ACNC: <89 % BASELINE

## 2018-10-11 ENCOUNTER — IMMUNIZATION (OUTPATIENT)
Dept: FAMILY MEDICINE CLINIC | Facility: CLINIC | Age: 53
End: 2018-10-11
Payer: COMMERCIAL

## 2018-10-11 DIAGNOSIS — Z23 ENCOUNTER FOR IMMUNIZATION: ICD-10-CM

## 2018-10-11 PROCEDURE — 90686 IIV4 VACC NO PRSV 0.5 ML IM: CPT

## 2018-10-11 PROCEDURE — 90471 IMMUNIZATION ADMIN: CPT

## 2019-04-01 ENCOUNTER — HOSPITAL ENCOUNTER (OUTPATIENT)
Dept: RADIOLOGY | Facility: HOSPITAL | Age: 54
Discharge: HOME | End: 2019-04-01
Attending: INTERNAL MEDICINE
Payer: COMMERCIAL

## 2019-04-01 DIAGNOSIS — E04.2 MULTIPLE THYROID NODULES: ICD-10-CM

## 2019-04-01 PROCEDURE — 76536 US EXAM OF HEAD AND NECK: CPT

## 2019-04-10 ENCOUNTER — OFFICE VISIT (OUTPATIENT)
Dept: ENDOCRINOLOGY | Facility: HOSPITAL | Age: 54
End: 2019-04-10
Payer: COMMERCIAL

## 2019-04-10 VITALS
WEIGHT: 188.2 LBS | SYSTOLIC BLOOD PRESSURE: 134 MMHG | HEART RATE: 76 BPM | BODY MASS INDEX: 31.36 KG/M2 | DIASTOLIC BLOOD PRESSURE: 76 MMHG | HEIGHT: 65 IN

## 2019-04-10 DIAGNOSIS — E04.2 MULTIPLE THYROID NODULES: Primary | ICD-10-CM

## 2019-04-10 PROCEDURE — 99214 OFFICE O/P EST MOD 30 MIN: CPT | Performed by: INTERNAL MEDICINE

## 2019-07-10 ENCOUNTER — ANNUAL EXAM (OUTPATIENT)
Dept: OBGYN CLINIC | Facility: CLINIC | Age: 54
End: 2019-07-10
Payer: COMMERCIAL

## 2019-07-10 VITALS
WEIGHT: 188 LBS | SYSTOLIC BLOOD PRESSURE: 132 MMHG | DIASTOLIC BLOOD PRESSURE: 78 MMHG | BODY MASS INDEX: 31.32 KG/M2 | HEIGHT: 65 IN

## 2019-07-10 DIAGNOSIS — Z12.39 SCREENING FOR MALIGNANT NEOPLASM OF BREAST: ICD-10-CM

## 2019-07-10 DIAGNOSIS — Z01.419 ENCOUNTER FOR GYNECOLOGICAL EXAMINATION WITHOUT ABNORMAL FINDING: Primary | ICD-10-CM

## 2019-07-10 PROCEDURE — 99396 PREV VISIT EST AGE 40-64: CPT | Performed by: OBSTETRICS & GYNECOLOGY

## 2019-07-10 NOTE — PROGRESS NOTES
Tim Mcintosh   1965    CC:  Yearly exam    S:  46 y o  female here for yearly exam  She is postmenopausal and has had no vaginal bleeding  She denies vaginal discharge, itching, odor or dryness  She is sexually active without pain, bleeding or dryness  We reviewed ASCCP guidelines for Pap testing  Last Pap 6/13/2018 - normal/negative HPV  Last Mammo 6/9/2018 - BIRAD-1  Last Colonoscopy 7/16/2015 - normal per patient; 10 year interval      Current Outpatient Medications:     Coenzyme Q10 (COQ10) 100 MG CAPS, Take by mouth, Disp: , Rfl:     hydrocortisone (WESTCORT) 0 2 % cream, Apply topically, Disp: , Rfl:   Social History     Socioeconomic History    Marital status: /Civil Union     Spouse name: Not on file    Number of children: Not on file    Years of education: Not on file    Highest education level: Not on file   Occupational History    Not on file   Social Needs    Financial resource strain: Not on file    Food insecurity:     Worry: Not on file     Inability: Not on file    Transportation needs:     Medical: Not on file     Non-medical: Not on file   Tobacco Use    Smoking status: Never Smoker    Smokeless tobacco: Never Used   Substance and Sexual Activity    Alcohol use:  Yes     Alcohol/week: 2 0 standard drinks     Types: 2 Cans of beer per week    Drug use: No    Sexual activity: Yes     Partners: Male     Birth control/protection: Post-menopausal     Comment:    Lifestyle    Physical activity:     Days per week: Not on file     Minutes per session: Not on file    Stress: Not on file   Relationships    Social connections:     Talks on phone: Not on file     Gets together: Not on file     Attends [employer] service: Not on file     Active member of club or organization: Not on file     Attends meetings of clubs or organizations: Not on file     Relationship status: Not on file    Intimate partner violence:     Fear of current or ex partner: Not on file Emotionally abused: Not on file     Physically abused: Not on file     Forced sexual activity: Not on file   Other Topics Concern    Not on file   Social History Narrative    Activities: bicycling    Daily coffee consumption: 2 cp a day    Exercise: walking    Exercises moderately less than 3 times a week     Family History   Problem Relation Age of Onset    Hypertension Mother     Throat cancer Mother         laryngeal    Stroke Mother         syndrome    Substance Abuse Mother     Thyroid disease Mother     Esophageal cancer Father     Stroke Father         syndrome    Hypertension Brother     Hypertension Maternal Grandmother     Thyroid disease Maternal Grandmother     Hypertension Maternal Grandfather     Stroke Maternal Grandfather         syndrome    No Known Problems Daughter     Other Son         racing heart rhythm    Emphysema Paternal Grandmother     Alzheimer's disease Paternal Grandfather     Heart murmur Son     Ovarian cancer Sister     Mental illness Neg Hx      Past Medical History:   Diagnosis Date    Blood type, Rh negative     Ganglion cyst of wrist, right     Overweight         Review of Systems   Respiratory: Negative  Cardiovascular: Negative  Gastrointestinal: Negative for constipation and diarrhea  Genitourinary: Negative for difficulty urinating, pelvic pain, vaginal bleeding, vaginal discharge, itching or odor  O:  Blood pressure 132/78, height 5' 5" (1 651 m), weight 85 3 kg (188 lb)  Patient appears well and is not in distress  Neck is supple without masses  Breasts are symmetrical without mass, tenderness, nipple discharge, skin changes or adenopathy  Abdomen is soft and nontender without masses  External genitals are normal without lesions or rashes  Urethral meatus and urethra are normal  Bladder is normal to palpation  Vagina is normal without discharge or bleeding  Cervix is normal without discharge or lesion     Uterus is normal, mobile, nontender without palpable mass  Adnexa are normal, nontender, without palpable mass  A:  Yearly exam      P:   Pap due 2023  Mammo slip given  RTO one year for yearly exam or sooner as needed

## 2019-07-19 ENCOUNTER — HOSPITAL ENCOUNTER (OUTPATIENT)
Dept: MAMMOGRAPHY | Facility: HOSPITAL | Age: 54
Discharge: HOME | End: 2019-07-19
Payer: COMMERCIAL

## 2019-07-19 DIAGNOSIS — Z12.31 ENCOUNTER FOR SCREENING MAMMOGRAM FOR MALIGNANT NEOPLASM OF BREAST: ICD-10-CM

## 2019-07-19 DIAGNOSIS — R92.2 DENSE BREAST TISSUE: ICD-10-CM

## 2019-07-19 PROCEDURE — 77067 SCR MAMMO BI INCL CAD: CPT

## 2019-07-19 PROCEDURE — 77063 BREAST TOMOSYNTHESIS BI: CPT

## 2019-10-14 ENCOUNTER — IMMUNIZATIONS (OUTPATIENT)
Dept: FAMILY MEDICINE CLINIC | Facility: CLINIC | Age: 54
End: 2019-10-14
Payer: COMMERCIAL

## 2019-10-14 DIAGNOSIS — Z23 ENCOUNTER FOR IMMUNIZATION: ICD-10-CM

## 2019-10-14 PROCEDURE — 90682 RIV4 VACC RECOMBINANT DNA IM: CPT

## 2019-10-14 PROCEDURE — 90471 IMMUNIZATION ADMIN: CPT

## 2020-03-23 ENCOUNTER — TELEPHONE (OUTPATIENT)
Dept: ENDOCRINOLOGY | Facility: HOSPITAL | Age: 55
End: 2020-03-23

## 2020-03-23 DIAGNOSIS — R79.89 LOW TSH LEVEL: Primary | ICD-10-CM

## 2020-03-23 DIAGNOSIS — E04.2 MULTIPLE THYROID NODULES: ICD-10-CM

## 2020-03-23 NOTE — TELEPHONE ENCOUNTER
Patient's appt was moved out to [facility], can we mail her lab slips with a new date?

## 2020-03-31 DIAGNOSIS — E04.2 MULTIPLE THYROID NODULES: Primary | ICD-10-CM

## 2020-04-23 ENCOUNTER — HOSPITAL ENCOUNTER (OUTPATIENT)
Dept: RADIOLOGY | Facility: HOSPITAL | Age: 55
Discharge: HOME | End: 2020-04-23
Attending: INTERNAL MEDICINE
Payer: COMMERCIAL

## 2020-04-23 DIAGNOSIS — E04.2 MULTIPLE THYROID NODULES: ICD-10-CM

## 2020-04-23 PROCEDURE — 76536 US EXAM OF HEAD AND NECK: CPT

## 2020-05-01 ENCOUNTER — TELEMEDICINE (OUTPATIENT)
Dept: ENDOCRINOLOGY | Facility: HOSPITAL | Age: 55
End: 2020-05-01
Payer: COMMERCIAL

## 2020-05-01 DIAGNOSIS — E04.2 MULTIPLE THYROID NODULES: Primary | ICD-10-CM

## 2020-05-01 PROCEDURE — 99213 OFFICE O/P EST LOW 20 MIN: CPT | Performed by: INTERNAL MEDICINE

## 2020-07-21 ENCOUNTER — ANNUAL EXAM (OUTPATIENT)
Dept: OBGYN CLINIC | Facility: CLINIC | Age: 55
End: 2020-07-21
Payer: COMMERCIAL

## 2020-07-21 VITALS
TEMPERATURE: 98.1 F | DIASTOLIC BLOOD PRESSURE: 70 MMHG | WEIGHT: 187 LBS | SYSTOLIC BLOOD PRESSURE: 130 MMHG | BODY MASS INDEX: 31.16 KG/M2 | HEIGHT: 65 IN

## 2020-07-21 DIAGNOSIS — Z12.31 ENCOUNTER FOR SCREENING MAMMOGRAM FOR MALIGNANT NEOPLASM OF BREAST: ICD-10-CM

## 2020-07-21 DIAGNOSIS — Z01.419 ENCOUNTER FOR GYNECOLOGICAL EXAMINATION (GENERAL) (ROUTINE) WITHOUT ABNORMAL FINDINGS: Primary | ICD-10-CM

## 2020-07-21 PROBLEM — N95.0 POSTMENOPAUSAL BLEEDING: Status: RESOLVED | Noted: 2018-03-30 | Resolved: 2020-07-21

## 2020-07-21 PROCEDURE — 99396 PREV VISIT EST AGE 40-64: CPT | Performed by: OBSTETRICS & GYNECOLOGY

## 2020-07-21 PROCEDURE — 3008F BODY MASS INDEX DOCD: CPT | Performed by: OBSTETRICS & GYNECOLOGY

## 2020-07-21 NOTE — PROGRESS NOTES
Faby Seven   1965    CC:  Yearly exam    S:  53 y o  female here for yearly exam  She is postmenopausal and has had no vaginal bleeding  She denies vaginal discharge, itching, odor or dryness  She is frustrated with weight gain during [place]  Discussed the Obesity Code  Sexual activity: She is sexually active without pain, bleeding or dryness  Last Pap: 6/13/2018 - normal/negative HPV  Last Mammo: 7/19/2019 - BIRAD-2  Last Colonoscopy: age 45 - 8 year intervals  Last DEXA: never    We reviewed [facility] guidelines for Pap testing  Current Outpatient Medications:     Coenzyme Q10 (COQ10) 100 MG CAPS, Take by mouth, Disp: , Rfl:     hydrocortisone (WESTCORT) 0 2 % cream, Apply topically, Disp: , Rfl:   Social History     Socioeconomic History    Marital status: /Civil Union     Spouse name: Not on file    Number of children: Not on file    Years of education: Not on file    Highest education level: Not on file   Occupational History    Not on file   Social Needs    Financial resource strain: Not on file    Food insecurity:     Worry: Not on file     Inability: Not on file    Transportation needs:     Medical: Not on file     Non-medical: Not on file   Tobacco Use    Smoking status: Never Smoker    Smokeless tobacco: Never Used   Substance and Sexual Activity    Alcohol use:  Yes     Alcohol/week: 2 0 standard drinks     Types: 2 Cans of beer per week     Frequency: 2-3 times a week     Drinks per session: 1 or 2     Binge frequency: Never    Drug use: No    Sexual activity: Yes     Partners: Male     Birth control/protection: Post-menopausal     Comment:    Lifestyle    Physical activity:     Days per week: Not on file     Minutes per session: Not on file    Stress: Not on file   Relationships    Social connections:     Talks on phone: Not on file     Gets together: Not on file     Attends Restorationism service: Not on file     Active member of club or organization: Not on file     Attends meetings of clubs or organizations: Not on file     Relationship status: Not on file    Intimate partner violence:     Fear of current or ex partner: Not on file     Emotionally abused: Not on file     Physically abused: Not on file     Forced sexual activity: Not on file   Other Topics Concern    Not on file   Social History Narrative    Activities: bicycling    Daily coffee consumption: 2 cp a day    Exercise: walking    Exercises moderately less than 3 times a week     Family History   Problem Relation Age of Onset    Hypertension Mother     Throat cancer Mother         laryngeal    Stroke Mother         syndrome    Substance Abuse Mother     Thyroid disease Mother     Lung cancer Mother     Esophageal cancer Father     Stroke Father         syndrome    Hypertension Brother     Hypertension Maternal Grandmother     Thyroid disease Maternal Grandmother     Hypertension Maternal Grandfather     Stroke Maternal Grandfather         syndrome    No Known Problems Daughter     Other Son         racing heart rhythm    Emphysema Paternal Grandmother     Alzheimer's disease Paternal Grandfather     Heart murmur Son     Ovarian cancer Sister     Mental illness Neg Hx      Past Medical History:   Diagnosis Date    Blood type, Rh negative     Ganglion cyst of wrist, right     Overweight         Review of Systems   Respiratory: Negative  Cardiovascular: Negative  Gastrointestinal: Negative for constipation and diarrhea  Genitourinary: Negative for difficulty urinating, pelvic pain, vaginal bleeding, vaginal discharge, itching or odor  O:  Blood pressure 130/70, temperature 98 1 °F (36 7 °C), height 5' 5" (1 651 m), weight 84 8 kg (187 lb)  Patient appears well and is not in distress  Neck is supple without masses  Breasts are symmetrical without mass, tenderness, nipple discharge, skin changes or adenopathy  Abdomen is soft and nontender without masses  External genitals are normal without lesions or rashes  Urethral meatus and urethra are normal  Bladder is normal to palpation  Vagina is normal without discharge or bleeding  Cervix is normal without discharge or lesion  Uterus is normal, mobile, nontender without palpable mass  Adnexa are normal, nontender, without palpable mass  A:  Yearly exam      P:   Pap due 2023  Mammo slip given   Colonoscopy due age 63   DEXA due age 71 unless risk factors develop    RTO one year for yearly exam or sooner as needed

## 2020-09-17 ENCOUNTER — HOSPITAL ENCOUNTER (OUTPATIENT)
Dept: MAMMOGRAPHY | Facility: MEDICAL CENTER | Age: 55
Discharge: HOME | End: 2020-09-17
Payer: COMMERCIAL

## 2020-09-17 VITALS — BODY MASS INDEX: 31.16 KG/M2 | WEIGHT: 187 LBS | HEIGHT: 65 IN

## 2020-09-17 DIAGNOSIS — Z12.31 ENCOUNTER FOR SCREENING MAMMOGRAM FOR MALIGNANT NEOPLASM OF BREAST: ICD-10-CM

## 2020-09-17 PROCEDURE — 77067 SCR MAMMO BI INCL CAD: CPT

## 2020-09-17 PROCEDURE — 77063 BREAST TOMOSYNTHESIS BI: CPT

## 2020-09-29 ENCOUNTER — HOSPITAL ENCOUNTER (OUTPATIENT)
Dept: ULTRASOUND IMAGING | Facility: CLINIC | Age: 55
Discharge: HOME | End: 2020-09-29
Payer: COMMERCIAL

## 2020-09-29 ENCOUNTER — HOSPITAL ENCOUNTER (OUTPATIENT)
Dept: MAMMOGRAPHY | Facility: CLINIC | Age: 55
Discharge: HOME | End: 2020-09-29
Payer: COMMERCIAL

## 2020-09-29 VITALS — HEIGHT: 65 IN | BODY MASS INDEX: 31.16 KG/M2 | WEIGHT: 187 LBS | TEMPERATURE: 97.1 F

## 2020-09-29 DIAGNOSIS — R92.8 ABNORMAL SCREENING MAMMOGRAM: ICD-10-CM

## 2020-09-29 PROCEDURE — G0279 TOMOSYNTHESIS, MAMMO: HCPCS

## 2020-09-29 PROCEDURE — 77065 DX MAMMO INCL CAD UNI: CPT

## 2020-09-29 PROCEDURE — 76642 ULTRASOUND BREAST LIMITED: CPT

## 2020-10-02 ENCOUNTER — IMMUNIZATIONS (OUTPATIENT)
Dept: FAMILY MEDICINE CLINIC | Facility: CLINIC | Age: 55
End: 2020-10-02
Payer: COMMERCIAL

## 2020-10-02 DIAGNOSIS — Z23 NEED FOR VACCINATION: Primary | ICD-10-CM

## 2020-10-02 PROCEDURE — 90682 RIV4 VACC RECOMBINANT DNA IM: CPT

## 2020-10-02 PROCEDURE — 90471 IMMUNIZATION ADMIN: CPT

## 2021-02-09 ENCOUNTER — VBI (OUTPATIENT)
Dept: ADMINISTRATIVE | Facility: OTHER | Age: 56
End: 2021-02-09

## 2021-03-30 DIAGNOSIS — Z23 ENCOUNTER FOR IMMUNIZATION: ICD-10-CM

## 2021-04-26 ENCOUNTER — HOSPITAL ENCOUNTER (OUTPATIENT)
Dept: RADIOLOGY | Facility: HOSPITAL | Age: 56
Discharge: HOME | End: 2021-04-26
Attending: INTERNAL MEDICINE
Payer: COMMERCIAL

## 2021-04-26 DIAGNOSIS — E04.2 MULTIPLE THYROID NODULES: ICD-10-CM

## 2021-04-26 PROCEDURE — 76536 US EXAM OF HEAD AND NECK: CPT

## 2021-04-28 LAB
T4 FREE SERPL-MCNC: 1.1 NG/DL (ref 0.8–1.8)
TSH SERPL-ACNC: 2.44 MIU/L (ref 0.4–4.5)

## 2021-05-05 ENCOUNTER — OFFICE VISIT (OUTPATIENT)
Dept: ENDOCRINOLOGY | Facility: HOSPITAL | Age: 56
End: 2021-05-05
Payer: COMMERCIAL

## 2021-05-05 VITALS
DIASTOLIC BLOOD PRESSURE: 80 MMHG | SYSTOLIC BLOOD PRESSURE: 118 MMHG | HEART RATE: 74 BPM | HEIGHT: 65 IN | BODY MASS INDEX: 31.52 KG/M2 | WEIGHT: 189.2 LBS

## 2021-05-05 DIAGNOSIS — E04.2 MULTIPLE THYROID NODULES: Primary | ICD-10-CM

## 2021-05-05 PROCEDURE — 99213 OFFICE O/P EST LOW 20 MIN: CPT | Performed by: INTERNAL MEDICINE

## 2021-05-05 PROCEDURE — 3008F BODY MASS INDEX DOCD: CPT | Performed by: INTERNAL MEDICINE

## 2021-05-05 PROCEDURE — 1036F TOBACCO NON-USER: CPT | Performed by: INTERNAL MEDICINE

## 2021-05-05 NOTE — PROGRESS NOTES
5/5/2021    Assessment/Plan      Diagnoses and all orders for this visit:    Multiple thyroid nodules  -     TSH, 3rd generation Lab Collect; Future  -     US thyroid; Future  -     T4, free- Lab Collect; Future    Other orders  -     patient supplied medication; Dr Formulated Probiotics        Assessment/Plan:    Thyroid nodules:  Recent thyroid function is normal   Thyroid nodules overall looks stable  There is a small nodule that was not apparent on prior ultrasound but looks small on low risk and can follow with lab work and ultrasound in 1 year  She will call sooner with any neck compressive symptoms or other concerns  CC: Follow-up    History of Present Illness     HPI: Amrit Aiken is a 63y o  year old female with history ofThyroid nodules who presents for a follow-up  She does have a family history of hyperthyroidism in her grandmother and hypothyroidism in her mother  No neck compressive symptoms  No known family history of thyroid cancer or personal history of head or neck radiation  She presents today for a follow-up appointment overall feeling well  No neck compressive symptoms  Overall no new health issues or symptoms of concern  Review of Systems   Constitutional: Negative for fatigue  HENT: Negative for trouble swallowing and voice change  Eyes: Negative for visual disturbance  Respiratory: Negative for shortness of breath  Cardiovascular: Negative for palpitations and leg swelling  Gastrointestinal: Negative for abdominal pain, nausea and vomiting  Endocrine: Negative for polydipsia and polyuria  Musculoskeletal: Negative for arthralgias and myalgias  Skin: Negative for rash  Neurological: Negative for dizziness, tremors and weakness  Hematological: Negative for adenopathy  Psychiatric/Behavioral: Negative for agitation and confusion         Historical Information   Past Medical History:   Diagnosis Date    Blood type, Rh negative     Ganglion cyst of wrist, right     Overweight      Past Surgical History:   Procedure Laterality Date     SECTION, LOW TRANSVERSE      CHROMOSOME ANALYSIS, PRODUCTS OF CONCEPTION (HISTORICAL)      surgical treatment of missed     INDUCED       by dilation and evacuation    WISDOM TOOTH EXTRACTION       Social History   Social History     Substance and Sexual Activity   Alcohol Use Yes    Alcohol/week: 2 0 standard drinks    Types: 2 Cans of beer per week    Frequency: 2-3 times a week    Drinks per session: 1 or 2    Binge frequency: Never     Social History     Substance and Sexual Activity   Drug Use No     Social History     Tobacco Use   Smoking Status Never Smoker   Smokeless Tobacco Never Used     Family History:   Family History   Problem Relation Age of Onset    Hypertension Mother     Throat cancer Mother         laryngeal    Stroke Mother         syndrome    Substance Abuse Mother     Thyroid disease Mother     Lung cancer Mother     Esophageal cancer Father     Stroke Father         syndrome    Hypertension Brother     Hypertension Maternal Grandmother     Thyroid disease Maternal Grandmother     Hypertension Maternal Grandfather     Stroke Maternal Grandfather         syndrome    No Known Problems Daughter     Other Son         racing heart rhythm    Emphysema Paternal Grandmother     Alzheimer's disease Paternal Grandfather     Heart murmur Son     Ovarian cancer Sister     Mental illness Neg Hx        Meds/Allergies   Current Outpatient Medications   Medication Sig Dispense Refill    Coenzyme Q10 (COQ10) 100 MG CAPS Take by mouth      hydrocortisone (WESTCORT) 0 2 % cream Apply topically      patient supplied medication Dr Formulated Probiotics       No current facility-administered medications for this visit  No Known Allergies    Objective   Vitals: Blood pressure 118/80, pulse 74, height 5' 5" (1 651 m), weight 85 8 kg (189 lb 3 2 oz)    Invasive Devices None                 Physical Exam  Vitals signs reviewed  Constitutional:       General: She is not in acute distress  Appearance: She is well-developed  She is not diaphoretic  HENT:      Head: Normocephalic and atraumatic  Eyes:      Conjunctiva/sclera: Conjunctivae normal       Pupils: Pupils are equal, round, and reactive to light  Neck:      Musculoskeletal: Normal range of motion and neck supple  Thyroid: No thyromegaly  Cardiovascular:      Rate and Rhythm: Normal rate and regular rhythm  Pulmonary:      Effort: Pulmonary effort is normal  No respiratory distress  Breath sounds: Normal breath sounds  Abdominal:      General: Bowel sounds are normal       Palpations: Abdomen is soft  Musculoskeletal: Normal range of motion  Skin:     General: Skin is warm and dry  Findings: No rash  Neurological:      Mental Status: She is alert and oriented to person, place, and time  Motor: No abnormal muscle tone  Psychiatric:         Behavior: Behavior normal          The history was obtained from the review of the chart and from the patient  Lab Results:      Recent Results (from the past 36357 hour(s))   T4, free    Collection Time: 04/27/21  7:04 AM   Result Value Ref Range    Free t4 1 1 0 8 - 1 8 ng/dL   TSH, 3rd generation    Collection Time: 04/27/21  7:04 AM   Result Value Ref Range    TSH 2 44 0 40 - 4 50 mIU/L     4/26/2021:  THYROID ULTRASOUND     INDICATION:    E04 2: Nontoxic multinodular goiter      COMPARISON:  4/23/2020     TECHNIQUE:   Ultrasound of the thyroid was performed with a high frequency linear transducer in transverse and sagittal planes including volumetric imaging sweeps as well as traditional still imaging technique      FINDINGS:  Normal homogeneous smooth echotexture      Right lobe:  4 9 x 1 3 x 1 6 cm   5 0 mL  Left lobe:  4 2 x 1 0 x 1 3 cm   2 8 mL  Isthmus:  0 2 cm      Nodule #1  Image 1/4    RIGHT midgland nodule measuring 0 5 x 0 6 x 0  5 cm  Not apparent or not imaged on the prior study  COMPOSITION:  2 points, solid or almost completely solid   ECHOGENICITY:  2 points, hypoechoic  SHAPE:  0 points, wider-than-tall  MARGIN: 0 points, smooth  ECHOGENIC FOCI:  0 points, none or large comet-tail artifacts  TI-RADS Classification: TR 4 (4-6 points), FNA if > 1 5 cm  Follow if > 1cm      Nodule #2  Image 1/10  Right isthmus measuring 0 9 x 0 6 x 0 7 cm  Given differences in measuring technique, no significant change from prior  COMPOSITION:  2 points, solid or almost completely solid   ECHOGENICITY:  2 points, hypoechoic  SHAPE:  0 points, wider-than-tall  MARGIN: 0 points, smooth  ECHOGENIC FOCI:  0 points, none or large comet-tail artifacts  TI-RADS Classification: TR 4 (4-6 points), FNA if > 1 5 cm  Follow if > 1cm                    IMPRESSION:     No nodule meets current ACR criteria for requiring biopsy but followup ultrasound is recommended in 2 years             Reference: ACR Thyroid Imaging, Reporting and Data System (TI-RADS): White Paper of the [employer]  J AM Raj Radiol 3707;01:531-240  (additional recommendations based on American Thyroid Association 2015 guidelines )  The study was marked in [facility] for significant notification      Workstation performed: [id number]       Future Appointments   Date Time Provider Jolanta Harmon   8/3/2021  1:00 PM Celestine Valverde MD [facility]       Portions of the record may have been created with voice recognition software  Occasional wrong word or "sound a like" substitutions may have occurred due to the inherent limitations of voice recognition software  Read the chart carefully and recognize, using context, where substitutions have occurred

## 2021-06-28 DIAGNOSIS — L30.9 ECZEMA, UNSPECIFIED TYPE: Primary | ICD-10-CM

## 2021-07-21 ENCOUNTER — OFFICE VISIT (OUTPATIENT)
Dept: FAMILY MEDICINE CLINIC | Facility: CLINIC | Age: 56
End: 2021-07-21
Payer: COMMERCIAL

## 2021-07-21 ENCOUNTER — APPOINTMENT (OUTPATIENT)
Dept: RADIOLOGY | Facility: CLINIC | Age: 56
End: 2021-07-21
Payer: COMMERCIAL

## 2021-07-21 VITALS
RESPIRATION RATE: 16 BRPM | WEIGHT: 187.1 LBS | SYSTOLIC BLOOD PRESSURE: 140 MMHG | HEART RATE: 78 BPM | DIASTOLIC BLOOD PRESSURE: 88 MMHG | BODY MASS INDEX: 30.07 KG/M2 | HEIGHT: 66 IN

## 2021-07-21 DIAGNOSIS — M25.571 ACUTE RIGHT ANKLE PAIN: ICD-10-CM

## 2021-07-21 DIAGNOSIS — M25.571 ACUTE RIGHT ANKLE PAIN: Primary | ICD-10-CM

## 2021-07-21 DIAGNOSIS — S82.891A CLOSED AVULSION FRACTURE OF RIGHT ANKLE, INITIAL ENCOUNTER: Primary | ICD-10-CM

## 2021-07-21 PROCEDURE — 73610 X-RAY EXAM OF ANKLE: CPT

## 2021-07-21 PROCEDURE — 3725F SCREEN DEPRESSION PERFORMED: CPT | Performed by: NURSE PRACTITIONER

## 2021-07-21 PROCEDURE — 99214 OFFICE O/P EST MOD 30 MIN: CPT | Performed by: NURSE PRACTITIONER

## 2021-07-21 NOTE — PROGRESS NOTES
Assessment/Plan:     Diagnoses and all orders for this visit:    Acute right ankle pain  -     XR ankle 3+ vw right; Future      Appears to be an right ankle sprain  We will get xray to r/o any fracture  Pt in the meantime to follow RICE protocol as well as PRN use of Ibuprofen for the next several days  She is to contact office for any persisting or worsening symptoms  Patient states they understand and agree with treatment plan  Pt to f/u PRN  Subjective:      Patient ID: Ronold Peabody is a 63 y o  female  Pt presents for a fall she encountered yesterday 07/20 at approx 4:30 pm   She notes she fell backwards on the steps and injured her ankle  Pt admits that she did not hear any audible pops, clicks, crunching  She is not sure how her ankle moved during the fall, whether it rolled or not  Pt has it wrapped today in Ace wrap  She states overall the pain is not terrible, and very minimal  She is able to walk, but limits the amount of weight to it r/t swelling  She denies erythema, bruising, numbness, tingling  The following portions of the patient's history were reviewed and updated as appropriate: allergies, current medications, past family history, past medical history, past social history, past surgical history and problem list     Review of Systems   Constitutional: Negative  Negative for chills and fever  HENT: Negative  Negative for ear pain and sore throat  Eyes: Negative  Negative for pain and visual disturbance  Respiratory: Negative  Negative for cough and shortness of breath  Cardiovascular: Negative  Negative for chest pain and palpitations  Gastrointestinal: Negative  Negative for abdominal pain and vomiting  Genitourinary: Negative for dysuria and hematuria  Musculoskeletal: Positive for joint swelling (right ankle s/p fall yesterday 07/20)  Negative for arthralgias and back pain  Skin: Negative  Negative for color change and rash     Neurological: Negative  Negative for seizures and syncope  All other systems reviewed and are negative  Objective:      /88   Pulse 78   Resp 16   Ht 5' 5 5" (1 664 m)   Wt 84 9 kg (187 lb 1 6 oz)   LMP  (LMP Unknown)   BMI 30 66 kg/m²          Physical Exam  Vitals reviewed  Constitutional:       Appearance: Normal appearance  HENT:      Head: Normocephalic  Cardiovascular:      Rate and Rhythm: Normal rate and regular rhythm  Pulses: Normal pulses  No decreased pulses  Dorsalis pedis pulses are 2+ on the right side and 2+ on the left side  Posterior tibial pulses are 2+ on the right side and 2+ on the left side  Heart sounds: Normal heart sounds  Pulmonary:      Effort: Pulmonary effort is normal       Breath sounds: Normal breath sounds  Abdominal:      General: Bowel sounds are normal       Palpations: Abdomen is soft  Musculoskeletal:         General: Swelling (right ankle) and signs of injury (s/p fall on right ankle 07/20) present  No deformity  Normal range of motion  Right lower leg: Edema (right ankle swelling) present  Left lower leg: No edema  Comments: Swelling to outer right ankle, no erythema or bruising  Normal sensation and capillary refill   Skin:     General: Skin is warm and dry  Capillary Refill: Capillary refill takes less than 2 seconds  Findings: No bruising or erythema  Neurological:      Mental Status: She is alert and oriented to person, place, and time  Mental status is at baseline     Psychiatric:         Mood and Affect: Mood normal          Behavior: Behavior normal

## 2021-07-21 NOTE — PATIENT INSTRUCTIONS
Ankle Sprain   WHAT YOU NEED TO KNOW:   An ankle sprain happens when 1 or more ligaments in your ankle joint stretch or tear  Ligaments are tough tissues that connect bones  Ligaments support your joints and keep your bones in place  DISCHARGE INSTRUCTIONS:   Return to the emergency department if:   · You have severe pain in your ankle  · Your foot or toes are cold or numb  · Your ankle becomes more weak or unstable (wobbly)  · You are unable to put any weight on your ankle or foot  · Your swelling has increased or returned  Call your doctor if:   · Your pain does not go away, even after treatment  · You have questions or concerns about your condition or care  Medicines: You may need any of the following:  · NSAIDs , such as ibuprofen, help decrease swelling, pain, and fever  This medicine is available with or without a doctor's order  NSAIDs can cause stomach bleeding or kidney problems in certain people  If you take blood thinner medicine, always ask your healthcare provider if NSAIDs are safe for you  Always read the medicine label and follow directions  · Acetaminophen  decreases pain and fever  It is available without a doctor's order  Ask how much to take and how often to take it  Follow directions  Read the labels of all other medicines you are using to see if they also contain acetaminophen, or ask your doctor or pharmacist  Acetaminophen can cause liver damage if not taken correctly  Do not use more than 4 grams (4,000 milligrams) total of acetaminophen in one day  · Prescription pain medicine  may be given  Ask your healthcare provider how to take this medicine safely  Some prescription pain medicines contain acetaminophen  Do not take other medicines that contain acetaminophen without talking to your healthcare provider  Too much acetaminophen may cause liver damage  Prescription pain medicine may cause constipation   Ask your healthcare provider how to prevent or treat constipation  · Take your medicine as directed  Contact your healthcare provider if you think your medicine is not helping or if you have side effects  Tell him or her if you are allergic to any medicine  Keep a list of the medicines, vitamins, and herbs you take  Include the amounts, and when and why you take them  Bring the list or the pill bottles to follow-up visits  Carry your medicine list with you in case of an emergency  Self-care:   · Use support devices,  such as a brace, cast, or splint, to limit your movement and protect your joint  You may need to use crutches to decrease your pain as you move around  · Go to physical therapy as directed  A physical therapist teaches you exercises to help improve movement and strength, and to decrease pain  · Rest  your ankle so that it can heal  Return to normal activities as directed  · Apply ice  on your ankle for 15 to 20 minutes every hour or as directed  Use an ice pack, or put crushed ice in a plastic bag  Cover it with a towel  Ice helps prevent tissue damage and decreases swelling and pain  · Compress  your ankle  Ask if you should wrap an elastic bandage around your injured ligament  An elastic bandage provides support and helps decrease swelling and movement so your joint can heal  Wear as long as directed  · Elevate  your ankle above the level of your heart as often as you can  This will help decrease swelling and pain  Prop your ankle on pillows or blankets to keep it elevated comfortably  Prevent another ankle sprain:   · Let your ankle heal   Find out how long your ligament needs to heal  Do not do any physical activity until your healthcare provider says it is okay  If you start activity too soon, you may develop a more serious injury  · Always warm up and stretch  before you exercise or play sports  · Use the right equipment  Always wear shoes that fit well and are made for the activity that you are doing   You may also need ankle supports, elbow and knee pads, or braces  Follow up with your doctor as directed:  Write down your questions so you remember to ask them during your visits  © Copyright [employer] 2021 Information is for End User's use only and may not be sold, redistributed or otherwise used for commercial purposes  All illustrations and images included in CareNotes® are the copyrighted property of A D A M , Inc  or [facility] [employer]   The above information is an  only  It is not intended as medical advice for individual conditions or treatments  Talk to your doctor, nurse or pharmacist before following any medical regimen to see if it is safe and effective for you

## 2021-07-22 ENCOUNTER — OFFICE VISIT (OUTPATIENT)
Dept: OBGYN CLINIC | Facility: CLINIC | Age: 56
End: 2021-07-22
Payer: COMMERCIAL

## 2021-07-22 ENCOUNTER — TELEPHONE (OUTPATIENT)
Dept: OBGYN CLINIC | Facility: CLINIC | Age: 56
End: 2021-07-22

## 2021-07-22 VITALS — BODY MASS INDEX: 30.05 KG/M2 | HEIGHT: 66 IN | WEIGHT: 187 LBS

## 2021-07-22 DIAGNOSIS — S82.891A CLOSED AVULSION FRACTURE OF RIGHT ANKLE, INITIAL ENCOUNTER: ICD-10-CM

## 2021-07-22 PROCEDURE — 99203 OFFICE O/P NEW LOW 30 MIN: CPT | Performed by: PHYSICAL MEDICINE & REHABILITATION

## 2021-07-22 NOTE — PROGRESS NOTES
1  Closed avulsion fracture of right ankle, initial encounter  Ambulatory referral to Orthopedic Surgery     No orders of the defined types were placed in this encounter  Impression: This is a pleasant patient who presents with right ankle pain likely secondary to distal fibula avulsion fracture with date of injury as 7/20/2021  We discussed different treatment options and decided to proceed with a controlled ankle motion boot  She can progress to weight-bearing as tolerated  She will continue with ice and elevation  I will see her back in three weeks to reassess  Imaging Studies (I personally reviewed images in PACS and report):  Right ankle x-rays most recent to this encounter reviewed  These images show distal fibula avulsion fracture  The patient's pertinent emergency department/urgent care/referring physician's notes were reviewed  CPT 92914  A splint/brace from another health care provider was removed today  Return in about 3 weeks (around 8/12/2021)  HPI:  Liss Gonzalez is a 63 y o  female  who presents for evaluation of   Chief Complaint   Patient presents with    Left Ankle - Pain       Onset/Mechanism: 7/20/2021- she inverted her ankle  Location: Has not had any pain recently  Radiation: Denies  Provocative: Nothing yet  Severity: Not painful  Associated Symptoms: Swelling  Treatment so far: Crutches along with ice and ACE wrap  Elevation  Patient is accompanied by her   Review of Systems   Constitutional: Positive for activity change  Negative for fever  HENT: Negative for sore throat  Eyes: Negative for visual disturbance  Respiratory: Negative for shortness of breath  Cardiovascular: Negative for chest pain  Gastrointestinal: Negative for abdominal pain  Endocrine: Negative for polydipsia  Genitourinary: Negative for difficulty urinating  Musculoskeletal: Positive for arthralgias, gait problem and joint swelling     Skin: Negative for rash    Allergic/Immunologic: Negative for immunocompromised state  Neurological: Negative for weakness and numbness  Hematological: Does not bruise/bleed easily  Psychiatric/Behavioral: Negative for confusion  Following history reviewed and updated:  Past Medical History:   Diagnosis Date    Blood type, Rh negative     Ganglion cyst of wrist, right     Overweight      Past Surgical History:   Procedure Laterality Date     SECTION, LOW TRANSVERSE      CHROMOSOME ANALYSIS, PRODUCTS OF CONCEPTION (HISTORICAL)      surgical treatment of missed     INDUCED       by dilation and evacuation    WISDOM TOOTH EXTRACTION       Social History   Social History     Substance and Sexual Activity   Alcohol Use Yes    Alcohol/week: 2 0 standard drinks    Types: 2 Cans of beer per week     Social History     Substance and Sexual Activity   Drug Use No     Social History     Tobacco Use   Smoking Status Never Smoker   Smokeless Tobacco Never Used     Family History   Problem Relation Age of Onset    Hypertension Mother     Throat cancer Mother         laryngeal    Stroke Mother         syndrome    Substance Abuse Mother     Thyroid disease Mother     Lung cancer Mother     Esophageal cancer Father     Stroke Father         syndrome    Hypertension Brother     Hypertension Maternal Grandmother     Thyroid disease Maternal Grandmother     Hypertension Maternal Grandfather     Stroke Maternal Grandfather         syndrome    No Known Problems Daughter     Other Son         racing heart rhythm    Emphysema Paternal Grandmother     Alzheimer's disease Paternal Grandfather     Heart murmur Son     Ovarian cancer Sister     Mental illness Neg Hx      No Known Allergies     Constitutional:  Ht 5' 5 5" (1 664 m)   Wt 84 8 kg (187 lb)   LMP  (LMP Unknown)   BMI 30 65 kg/m²    General: NAD  Eyes: Anicteric sclerae  Neck: Supple  Lungs: Unlabored breathing    Cardiovascular: No lower extremity edema  Skin: Intact without erythema  Neurologic: Sensation intact to light touch  Psychiatric: Mood and affect are appropriate  Right Ankle Exam     Tenderness   The patient is experiencing tenderness in the ATF  Swelling: moderate    Range of Motion   Dorsiflexion: abnormal   Plantar flexion: normal   Eversion: abnormal   Inversion: abnormal     Other   Erythema: absent  Scars: absent  Sensation: normal  Pulse: present     Comments:  Normal Hopkin's and Kleiger's               Procedures

## 2021-07-22 NOTE — TELEPHONE ENCOUNTER
Pt was seen by Dr Amanda Carcamo today  Forgot to mention that she needs a note for her employer  She currently works from home but they are transitioning coming back to the office  Since she cannot drive, note needs to state that she needs to continue to work from home  Send [facility] to her [facility]

## 2021-07-22 NOTE — PATIENT INSTRUCTIONS
Over the next few days, you should do the following:     Ice the area for 15 minutes and then remove ice for at least 15 minutes  Try to do this as much as you can throughout the day (at least 4-5 x per day)  Ice serves as an anti-inflammatory and will help with recovery by reducing pain and swelling   Elevate the area above the level of your heart as much as you can  This will help decrease swelling and pain   Relative rest- avoid activities that cause discomfort/pain in that area   You can use compression or ACE wrap to help with swelling  Room temperature soaks with epsom salt can also help with muscle tightness/cramping  Epsom salt releases magnesium which can be helpful

## 2021-07-22 NOTE — LETTER
July 22, 2021     GilsonAriadna Franklin [address] [address]    Patient: Meagan Hogue   YOB: 1965   Date of Visit: 7/22/2021       Dear Dr Mahajan Blind: Thank you for referring Sean Kinsey to me for evaluation  Below are my notes for this consultation  If you have questions, please do not hesitate to call me  I look forward to following your patient along with you  Sincerely,        Perla Moise DO        CC: No Recipients  Perla Moise DO  7/22/2021  1:21 PM  Signed  1  Closed avulsion fracture of right ankle, initial encounter  Ambulatory referral to Orthopedic Surgery     No orders of the defined types were placed in this encounter  Impression: This is a pleasant patient who presents with right ankle pain likely secondary to distal fibula avulsion fracture with date of injury as 7/20/2021  We discussed different treatment options and decided to proceed with a controlled ankle motion boot  She can progress to weight-bearing as tolerated  She will continue with ice and elevation  I will see her back in three weeks to reassess  Imaging Studies (I personally reviewed images in PACS and report):  Right ankle x-rays most recent to this encounter reviewed  These images show distal fibula avulsion fracture  The patient's pertinent emergency department/urgent care/referring physician's notes were reviewed  CPT 91896  A splint/brace from another health care provider was removed today  Return in about 3 weeks (around 8/12/2021)  HPI:  Meagan Hogue is a 63 y o  female  who presents for evaluation of   Chief Complaint   Patient presents with    Left Ankle - Pain       Onset/Mechanism: 7/20/2021- she inverted her ankle  Location: Has not had any pain recently  Radiation: Denies  Provocative: Nothing yet  Severity: Not painful  Associated Symptoms: Swelling  Treatment so far: Crutches along with ice and ACE wrap  Elevation  Patient is accompanied by her   Review of Systems   Constitutional: Positive for activity change  Negative for fever  HENT: Negative for sore throat  Eyes: Negative for visual disturbance  Respiratory: Negative for shortness of breath  Cardiovascular: Negative for chest pain  Gastrointestinal: Negative for abdominal pain  Endocrine: Negative for polydipsia  Genitourinary: Negative for difficulty urinating  Musculoskeletal: Positive for arthralgias, gait problem and joint swelling  Skin: Negative for rash  Allergic/Immunologic: Negative for immunocompromised state  Neurological: Negative for weakness and numbness  Hematological: Does not bruise/bleed easily  Psychiatric/Behavioral: Negative for confusion         Following history reviewed and updated:  Past Medical History:   Diagnosis Date    Blood type, Rh negative     Ganglion cyst of wrist, right     Overweight      Past Surgical History:   Procedure Laterality Date     SECTION, LOW TRANSVERSE      CHROMOSOME ANALYSIS, PRODUCTS OF CONCEPTION (HISTORICAL)      surgical treatment of missed     INDUCED       by dilation and evacuation    WISDOM TOOTH EXTRACTION       Social History   Social History     Substance and Sexual Activity   Alcohol Use Yes    Alcohol/week: 2 0 standard drinks    Types: 2 Cans of beer per week     Social History     Substance and Sexual Activity   Drug Use No     Social History     Tobacco Use   Smoking Status Never Smoker   Smokeless Tobacco Never Used     Family History   Problem Relation Age of Onset    Hypertension Mother     Throat cancer Mother         laryngeal    Stroke Mother         syndrome    Substance Abuse Mother     Thyroid disease Mother     Lung cancer Mother     Esophageal cancer Father     Stroke Father         syndrome    Hypertension Brother     Hypertension Maternal Grandmother     Thyroid disease Maternal Grandmother     Hypertension Maternal Grandfather     Stroke Maternal Grandfather         syndrome    No Known Problems Daughter     Other Son         racing heart rhythm    Emphysema Paternal Grandmother     Alzheimer's disease Paternal Grandfather     Heart murmur Son     Ovarian cancer Sister     Mental illness Neg Hx      No Known Allergies     Constitutional:  Ht 5' 5 5" (1 664 m)   Wt 84 8 kg (187 lb)   LMP  (LMP Unknown)   BMI 30 65 kg/m²    General: NAD  Eyes: Anicteric sclerae  Neck: Supple  Lungs: Unlabored breathing  Cardiovascular: No lower extremity edema  Skin: Intact without erythema  Neurologic: Sensation intact to light touch  Psychiatric: Mood and affect are appropriate  Right Ankle Exam     Tenderness   The patient is experiencing tenderness in the ATF  Swelling: moderate    Range of Motion   Dorsiflexion: abnormal   Plantar flexion: normal   Eversion: abnormal   Inversion: abnormal     Other   Erythema: absent  Scars: absent  Sensation: normal  Pulse: present     Comments:  Normal Hopkin's and Kleiger's               Procedures

## 2021-08-03 ENCOUNTER — ANNUAL EXAM (OUTPATIENT)
Dept: OBGYN CLINIC | Facility: CLINIC | Age: 56
End: 2021-08-03
Payer: COMMERCIAL

## 2021-08-03 VITALS
SYSTOLIC BLOOD PRESSURE: 124 MMHG | WEIGHT: 187 LBS | HEIGHT: 66 IN | DIASTOLIC BLOOD PRESSURE: 84 MMHG | BODY MASS INDEX: 30.05 KG/M2

## 2021-08-03 DIAGNOSIS — Z12.31 ENCOUNTER FOR SCREENING MAMMOGRAM FOR MALIGNANT NEOPLASM OF BREAST: ICD-10-CM

## 2021-08-03 DIAGNOSIS — Z01.419 ENCOUNTER FOR GYNECOLOGICAL EXAMINATION (GENERAL) (ROUTINE) WITHOUT ABNORMAL FINDINGS: Primary | ICD-10-CM

## 2021-08-03 PROCEDURE — 99396 PREV VISIT EST AGE 40-64: CPT | Performed by: OBSTETRICS & GYNECOLOGY

## 2021-08-03 PROCEDURE — 3008F BODY MASS INDEX DOCD: CPT | Performed by: OBSTETRICS & GYNECOLOGY

## 2021-08-03 PROCEDURE — 1036F TOBACCO NON-USER: CPT | Performed by: OBSTETRICS & GYNECOLOGY

## 2021-08-03 NOTE — PROGRESS NOTES
Janessa Chavarria   1965    CC:  Yearly exam    S:  63 y o  female here for yearly exam  She is postmenopausal and has had no vaginal bleeding  She denies vaginal discharge, itching, odor or dryness  She rolled her ankle rushing up some stairs and has an avulsion fracture  She is currently in a boot and goes back next week  She is now having higher BPs and has a follow up with her PCP later in the month  She is s/p 2 COVID vaccines  Sexual activity: She is sexually active without pain, bleeding or dryness  Last Pap: 6/13/2018 - normal/negative HPV  Last Mammo: 9/17/2020 - BIRAD-1 left; right BIRAD-0; diagnostic 9/29/2020 - BIRAD-2  Last Colonoscopy: age 45; 10 years  Last DEXA: never    We reviewed ASC guidelines for Pap testing  Current Outpatient Medications:     Coenzyme Q10 (COQ10) 100 MG CAPS, Take by mouth, Disp: , Rfl:     hydrocortisone (WESTCORT) 0 2 % cream, Apply topically 2 (two) times a day, Disp: 45 g, Rfl: 3    patient supplied medication,  Formulated Probiotics, Disp: , Rfl:   Social History     Socioeconomic History    Marital status: /Civil Union     Spouse name: Not on file    Number of children: Not on file    Years of education: Not on file    Highest education level: Not on file   Occupational History    Not on file   Tobacco Use    Smoking status: Never Smoker    Smokeless tobacco: Never Used   Vaping Use    Vaping Use: Never used   Substance and Sexual Activity    Alcohol use:  Yes     Alcohol/week: 2 0 standard drinks     Types: 2 Cans of beer per week    Drug use: No    Sexual activity: Yes     Partners: Male     Birth control/protection: Post-menopausal     Comment:    Other Topics Concern    Not on file   Social History Narrative    Activities: bicycling    Daily coffee consumption: 2 cp a day    Exercise: walking    Exercises moderately less than 3 times a week     Social Determinants of Health     Financial Resource Strain:    Shira Mahmood Difficulty of Paying Living Expenses:    Food Insecurity:     Worried About Running Out of Food in the Last Year:     [address] in the Last Year:    Transportation Needs:     Lack of Transportation (Medical):  Lack of Transportation (Non-Medical):    Physical Activity:     Days of Exercise per Week:     Minutes of Exercise per Session:    Stress:     Feeling of Stress :    Social Connections:     Frequency of Communication with Friends and Family:     Frequency of Social Gatherings with Friends and Family:     Attends Nondenominational Services:     Active Member of Clubs or Organizations:     Attends Club or Organization Meetings:     Marital Status:    Intimate Partner Violence:     Fear of Current or Ex-Partner:     Emotionally Abused:     Physically Abused:     Sexually Abused:      Family History   Problem Relation Age of Onset    Hypertension Mother     Throat cancer Mother         laryngeal    Stroke Mother         syndrome    Substance Abuse Mother     Thyroid disease Mother     Lung cancer Mother     Esophageal cancer Father     Stroke Father         syndrome    Hypertension Brother     Hypertension Maternal Grandmother     Thyroid disease Maternal Grandmother     Hypertension Maternal Grandfather     Stroke Maternal Grandfather         syndrome    No Known Problems Daughter     Other Son         racing heart rhythm    Emphysema Paternal Grandmother     Alzheimer's disease Paternal Grandfather     Heart murmur Son     Ovarian cancer Sister     Mental illness Neg Hx      Past Medical History:   Diagnosis Date    Blood type, Rh negative     Ganglion cyst of wrist, right     Overweight         Review of Systems   Respiratory: Negative  Cardiovascular: Negative  Gastrointestinal: Negative for constipation and diarrhea  Genitourinary: Negative for difficulty urinating, pelvic pain, vaginal bleeding, vaginal discharge, itching or odor      O:  Blood pressure 124/84, height 5' 5 5" (1 664 m), weight 84 8 kg (187 lb)  Patient appears well and is not in distress  Neck is supple without masses  Breasts are symmetrical without mass, tenderness, nipple discharge, skin changes or adenopathy  Abdomen is soft and nontender without masses  External genitals are normal without lesions or rashes  Urethral meatus and urethra are normal  Bladder is normal to palpation  Vagina is normal without discharge or bleeding  Cervix is normal without discharge or lesion  Uterus is normal, mobile, nontender without palpable mass  Adnexa are normal, nontender, without palpable mass  A:  Yearly exam      P:   Pap due 2023  Mammo slip given   Colonoscopy up to date   DEXA due age 71 unless new risk factors develop    RTO one year for yearly exam or sooner as needed

## 2021-08-23 ENCOUNTER — OFFICE VISIT (OUTPATIENT)
Dept: OBGYN CLINIC | Facility: CLINIC | Age: 56
End: 2021-08-23
Payer: COMMERCIAL

## 2021-08-23 VITALS
DIASTOLIC BLOOD PRESSURE: 80 MMHG | BODY MASS INDEX: 30.65 KG/M2 | HEART RATE: 80 BPM | SYSTOLIC BLOOD PRESSURE: 168 MMHG | HEIGHT: 66 IN

## 2021-08-23 DIAGNOSIS — S82.891A CLOSED AVULSION FRACTURE OF RIGHT ANKLE, INITIAL ENCOUNTER: Primary | ICD-10-CM

## 2021-08-23 PROCEDURE — 99213 OFFICE O/P EST LOW 20 MIN: CPT | Performed by: PHYSICAL MEDICINE & REHABILITATION

## 2021-08-23 NOTE — PROGRESS NOTES
1  Closed avulsion fracture of right ankle, initial encounter  Ambulatory referral to Physical Therapy     Orders Placed This Encounter   Procedures    Ambulatory referral to Physical Therapy        Impression:  Patient is here in follow up of right ankle pain likely secondary to distal fibula avulsion fracture with date of injury as 7/20/2021  Patient will transition into supportive sneakers along with ankle lace-up brace for work  She can also start physical therapy  Her symptoms should only improve from here on out  I will see her back if needed  Imaging Studies (I personally reviewed images in PACS and report):  Right ankle x-rays most recent to this encounter reviewed  These images show distal fibula avulsion fracture  Return if symptoms worsen or fail to improve  Patient is in agreement with the above plan  HPI:  Moses Taveras is a 63 y o  female  who presents in follow up  Here for   Chief Complaint   Patient presents with    Right Ankle - Fracture, Follow-up       Date of injury:  7/20/2021- she inverted her ankle  Trajectory of symptoms:  Doing well  Review of Systems   Constitutional: Positive for activity change  Negative for fever  HENT: Negative for sore throat  Eyes: Negative for visual disturbance  Respiratory: Negative for shortness of breath  Cardiovascular: Negative for chest pain  Gastrointestinal: Negative for abdominal pain  Endocrine: Negative for polydipsia  Genitourinary: Negative for difficulty urinating  Musculoskeletal: Positive for arthralgias  Skin: Negative for rash  Allergic/Immunologic: Negative for immunocompromised state  Neurological: Negative for numbness  Hematological: Does not bruise/bleed easily  Psychiatric/Behavioral: Negative for confusion         Following history reviewed and updated:  Past Medical History:   Diagnosis Date    Blood type, Rh negative     Ganglion cyst of wrist, right     Overweight      Past Surgical History:   Procedure Laterality Date     SECTION, LOW TRANSVERSE      CHROMOSOME ANALYSIS, PRODUCTS OF CONCEPTION (HISTORICAL)      surgical treatment of missed     COLONOSCOPY      INDUCED       by dilation and evacuation    WISDOM TOOTH EXTRACTION       Social History   Social History     Substance and Sexual Activity   Alcohol Use Yes    Alcohol/week: 2 0 standard drinks    Types: 2 Cans of beer per week     Social History     Substance and Sexual Activity   Drug Use No     Social History     Tobacco Use   Smoking Status Never Smoker   Smokeless Tobacco Never Used     Family History   Problem Relation Age of Onset    Hypertension Mother     Throat cancer Mother         laryngeal    Stroke Mother         syndrome    Substance Abuse Mother     Thyroid disease Mother     Lung cancer Mother     Esophageal cancer Father     Stroke Father         syndrome    Hypertension Brother     Hypertension Maternal Grandmother     Thyroid disease Maternal Grandmother     Hypertension Maternal Grandfather     Stroke Maternal Grandfather         syndrome    No Known Problems Daughter     Other Son         racing heart rhythm    Emphysema Paternal Grandmother     Alzheimer's disease Paternal Grandfather     Heart murmur Son     Ovarian cancer Sister     Mental illness Neg Hx      No Known Allergies     Constitutional:  /80   Pulse 80   Ht 5' 5 5" (1 664 m)   LMP  (LMP Unknown)   BMI 30 65 kg/m²    General: NAD  Eyes: Clear sclerae  ENT: No inflammation, lesion, or mass of lips  No tracheal deviation  Musculoskeletal: As mentioned below  Integumentary: No visible rashes or skin lesions  Pulmonary/Chest: Effort normal  No respiratory distress  Neuro: CN's grossly intact, ANTHONY  Psych: Normal affect and judgement  Vascular: WWP  Right Ankle Exam     Tenderness   The patient is experiencing tenderness in the ATF    Swelling: mild    Range of Motion   The patient has normal right ankle ROM  Muscle Strength   The patient has normal right ankle strength      Other   Erythema: absent  Scars: absent  Sensation: normal  Pulse: present              Procedures

## 2021-08-23 NOTE — PATIENT INSTRUCTIONS
You will be starting physical therapy  It is important to do home exercises as given by your physical therapist as you go through PT to speed up your recovery  Physical therapy addresses the problems that are causing your pain, instead of covering them up, as some other treatment options do  We will see you back after completing physical therapy

## 2021-08-24 ENCOUNTER — OFFICE VISIT (OUTPATIENT)
Dept: FAMILY MEDICINE CLINIC | Facility: CLINIC | Age: 56
End: 2021-08-24
Payer: COMMERCIAL

## 2021-08-24 VITALS
HEIGHT: 65 IN | DIASTOLIC BLOOD PRESSURE: 84 MMHG | HEART RATE: 74 BPM | WEIGHT: 184 LBS | BODY MASS INDEX: 30.66 KG/M2 | SYSTOLIC BLOOD PRESSURE: 130 MMHG | RESPIRATION RATE: 18 BRPM

## 2021-08-24 DIAGNOSIS — S82.891A CLOSED AVULSION FRACTURE OF RIGHT ANKLE, INITIAL ENCOUNTER: ICD-10-CM

## 2021-08-24 DIAGNOSIS — Z00.00 ROUTINE GENERAL MEDICAL EXAMINATION AT A HEALTH CARE FACILITY: Primary | ICD-10-CM

## 2021-08-24 DIAGNOSIS — Z23 NEED FOR VACCINATION: ICD-10-CM

## 2021-08-24 PROCEDURE — 1036F TOBACCO NON-USER: CPT | Performed by: FAMILY MEDICINE

## 2021-08-24 PROCEDURE — 99396 PREV VISIT EST AGE 40-64: CPT | Performed by: FAMILY MEDICINE

## 2021-08-24 PROCEDURE — 90471 IMMUNIZATION ADMIN: CPT

## 2021-08-24 PROCEDURE — 3008F BODY MASS INDEX DOCD: CPT | Performed by: FAMILY MEDICINE

## 2021-08-24 PROCEDURE — 90750 HZV VACC RECOMBINANT IM: CPT

## 2021-08-24 NOTE — PATIENT INSTRUCTIONS
1  If you think of it take a picture of your 2019 blood work in send as an attachment through [address]    2  Please schedule a DEXA for bone density so that we knee know whether the ankle just broke on its own or if her bones are getting prematurely thin  You could call where you have the mammogram scheduled and see if they do DEXA there and can do both at the same time    3  We will give you for shingles today remember each shot gives you a 50 50 chance a fever chills or muscle pain that are taken away with Tylenol    4  Come back in 2 months for Shingrix 2      Otherwise we will see you yearly or as needed  Remember flu shot sometime in October

## 2021-08-24 NOTE — PROGRESS NOTES
SUBJECTIVE:-------------------------------------------------------------------------------------------    Yajaira Weinberg is a 63 y o   female and is here for routine health maintenance  She recently fractured her ankle on the right in just had the cast removed yesterday    Work does a comprehensive blood panel and we reviewed her 1 from 2018  She will send the 1 from 2019          Health Maintenance   Topic Date Due    Hepatitis C Screening  Never done    HIV Screening  Never done    BMI: Followup Plan  Never done    Influenza Vaccine (1) 09/01/2021    Breast Cancer Screening: Mammogram  09/29/2021    Depression Screening PHQ  07/21/2022    BMI: Adult  08/03/2022    Annual Physical  08/03/2022    DTaP,Tdap,and Td Vaccines (2 - Td or Tdap) 05/20/2023    Cervical Cancer Screening  06/13/2023    Colorectal Cancer Screening  07/16/2025    COVID-19 Vaccine  Completed    Pneumococcal Vaccine: Pediatrics (0 to 5 Years) and At-Risk Patients (6 to 59 Years)  Aged Out    HIB Vaccine  Aged Out    Hepatitis B Vaccine  Aged Out    IPV Vaccine  Aged Out    Hepatitis A Vaccine  Aged Out    Meningococcal ACWY Vaccine  Aged Out    HPV Vaccine  Aged [employer] History   Administered Date(s) Administered    Influenza, injectable, quadrivalent, preservative free 0 5 mL 10/11/2018, 10/14/2019    Influenza, recombinant, quadrivalent,injectable, preservative free 10/02/2020    SARS-CoV-2 / COVID-19 mRNA IM (Ruthe Moritz) 03/25/2021, 04/22/2021    Tdap 05/20/2013         Diet and Physical Activity  Diet: well balanced diet  Body mass index is 30 62 kg/m²    Exercise: frequently      General Health  Hearing:  Is normal  Vision: sees ophthalmologist/optometrist yearly  Dental:  Sees dentist every 6 months      Smoker no        ASSESSMENT/PLAN:-------------------------------------------------------------------------------------------    Patient's physical is up-to-date   Immunizations ; for Shingrix today  2nd Shingrix in 2 months    Cancer screenings; mammogram ordered  Will check DEXA because of ankle fracture and call patient with results      Patient instructed on exercise  Patient instructed on healthy choices for diet       NEXT PHYSICAL 1 YEAR          The following portions of the patient's history were reviewed and updated as appropriate: allergies, current medications, past family history, past medical history, past social history, past surgical history and problem list       OBJECTIVE:---------------------------------------------------------------------------------------------------    /84   Pulse 74   Resp 18   Ht 5' 5" (1 651 m)   Wt 83 5 kg (184 lb)   LMP  (LMP Unknown)   BMI 30 62 kg/m²   Wt Readings from Last 3 Encounters:   08/24/21 83 5 kg (184 lb)   08/03/21 84 8 kg (187 lb)   07/22/21 84 8 kg (187 lb)     BP Readings from Last 3 Encounters:   08/24/21 130/84   08/23/21 168/80   08/03/21 124/84     Pulse Readings from Last 3 Encounters:   08/24/21 74   08/23/21 80   07/21/21 78     Body mass index is 30 62 kg/m²  Health Maintenance   Topic Date Due    Hepatitis C Screening  Never done    HIV Screening  Never done    BMI: Followup Plan  Never done    Influenza Vaccine (1) 09/01/2021    Breast Cancer Screening: Mammogram  09/29/2021    Depression Screening PHQ  07/21/2022    BMI: Adult  08/03/2022    Annual Physical  08/03/2022    DTaP,Tdap,and Td Vaccines (2 - Td or Tdap) 05/20/2023    Cervical Cancer Screening  06/13/2023    Colorectal Cancer Screening  07/16/2025    COVID-19 Vaccine  Completed    Pneumococcal Vaccine: Pediatrics (0 to 5 Years) and At-Risk Patients (6 to 59 Years)  Aged Out    HIB Vaccine  Aged Out    Hepatitis B Vaccine  Aged Out    IPV Vaccine  Aged Out    Hepatitis A Vaccine  Aged Out    Meningococcal ACWY Vaccine  Aged Out    HPV Vaccine  Aged Out       ROS:   12 point review of systems negative            PHYSICAL EXAM:    Gen    No acute distress well-appearing well-nourished appears stated age    Mental status  Good judgment and insight oriented to time person and place, recent and remote memory intact mood and affect normal cooperative and patient is reasonable    HEENT  PERRLA 3 mm, EOMI without nystagmus, TMs clear, turbinates open pink no exudate, pharynx benign, tongue midline    Neck   supple no masses trachea midline positive click normal carotid upstrokes with no bruits    Cor  Regular rhythm without ectopy or murmur, no S3-S4, normal palpation that is no heave lift or thrill    Vascular  No edema, good pedal pulses    Lungs  CTA bilaterally in no respiratory distress no wheezes rhonchi or rales, normal to palpation no tactile fremitus    Abdomen  Soft, no palpable masses, no hepatosplenomegaly, normal bowel sounds, nontender    Lymphatics  No palpable nodes in the neck, supraclavicular area, axilla, or groin     Musculoskeletal  No clubbing cyanosis or edema muscle tone normal 12 point review of systems is negative    Skin  no rashes or abnormal appearing lesions    Neuro  Normal ambulation, cranial nerves 2-12 grossly intact, higher functioning with reasoning intact

## 2021-08-26 ENCOUNTER — EVALUATION (OUTPATIENT)
Dept: PHYSICAL THERAPY | Facility: REHABILITATION | Age: 56
End: 2021-08-26
Payer: COMMERCIAL

## 2021-08-26 DIAGNOSIS — R26.89 BALANCE PROBLEM: Primary | ICD-10-CM

## 2021-08-26 DIAGNOSIS — S82.891A CLOSED AVULSION FRACTURE OF RIGHT ANKLE, INITIAL ENCOUNTER: ICD-10-CM

## 2021-08-26 PROCEDURE — 97140 MANUAL THERAPY 1/> REGIONS: CPT | Performed by: PHYSICAL THERAPIST

## 2021-08-26 PROCEDURE — 97161 PT EVAL LOW COMPLEX 20 MIN: CPT | Performed by: PHYSICAL THERAPIST

## 2021-08-26 NOTE — PROGRESS NOTES
PT Evaluation     Today's date: 2021  Patient name: Leslie Garcia  : 1965  MRN: [id number]  Referring provider: David Kumari DO  Dx:   Encounter Diagnosis     ICD-10-CM    1  Balance problem  R26 89    2  Closed avulsion fracture of right ankle, initial encounter  S82 891A Ambulatory referral to Physical Therapy       Start Time: 1409  Stop Time: 1455  Total time in clinic (min): 46 minutes    Assessment  Assessment details: Leslie Garcia is a 63 y o  female who presents to therapy s/p R ankle avulsion fracture of the fibula  Pt presents with limited and painful ankle ROM, ankle edema, decreased LE strength, and balance impairments consistent with dx  These impairments are limiting pts ADLs, iADLs, and work duties  Pt will benefit from skilled outpatient PT to address the below stated impairments, to address therapy goals, to reduce pain, and improve function  Therapist explained to pt: findings of IE, rehab diagnosis, and POC  Pt-centered goals reviewed and confirmed by pt  Instruction also given for HEP  Pt expressed verbal agreement and understanding and verified understanding via teach back method  Pt also expressed satisfaction that their current concerns were addressed at the end of the session  Therapist explained to pt: findings of IE, rehab diagnosis, and POC  Pt-centered goals reviewed and confirmed by pt  Instruction also given for HEP  Pt expressed verbal agreement and understanding and verified understanding via teach back method  Pt also expressed satisfaction that their current concerns were addressed at the end of the session      Impairments: abnormal gait, abnormal muscle firing, abnormal or restricted ROM, activity intolerance, impaired balance, impaired physical strength, lacks appropriate home exercise program, pain with function and weight-bearing intolerance    Symptom irritability: moderateBarriers to therapy: None   Understanding of Dx/Px/POC: good   Prognosis: good    Goals  Short term goals: to be met in 2-3 weeks  Pt independent with initial HEP, rationale, technique and frequency, for ROM and pain control  Pt will report a 25% reduction in subjective pain complaints/symptoms to better manage ADLs and functional mobility  Pt will achieve an improvement in FOTO score indicating an improvement in pain and function  Able to maintain R SLS 10sec or > for improved ankle stability  Long term goals: to be met in 4-6 weeks  Pt will report a 85% or > reduction in subjective pain complaints/symptoms to better manage ADLs and functional mobility  Pt will demonstrate full and pain free ankle AROM for ease of transfers, ADLs, and ambulation  Increase strength of lower extremity musculature to a 5/5 or greater for improved ease of functional mobility  Pt will improve FOTO score to better then expected outcome indicating an overall improvement in pain and function   Pt able to reciprocally ascend/descend 1 flight of stairs without pain or substitution to be able to get to the second floor in pt's home  Pt will be able to perform work duties without an increase in pain  Pt independent with rationale, technique and plan for performance of advanced HEP to ensure independent self-management of sx's  Achieve pts therapy goal: flexibility in the ankle and stability    Plan  Patient would benefit from: skilled physical therapy  Planned modality interventions: TENS, thermotherapy: hydrocollator packs, traction, ultrasound, cryotherapy and low level laser therapy  Planned therapy interventions: body mechanics training, therapeutic training, therapeutic exercise, therapeutic activities, stretching, strengthening, neuromuscular re-education, patient education, home exercise program, functional ROM exercises, flexibility, manual therapy, Marshall taping, joint mobilization and balance  Frequency: 2-3x/week    Duration in weeks: 12  Treatment plan discussed with: patient        Subjective Evaluation    History of Present Illness  Mechanism of injury: trauma  Mechanism of injury: Pt presents s/p R ankle avulsion fx that occurred back in July  Pt states she turned her ankle going up stairs  Went backwards  Pt was placed in air cast for about 1 month  Just came out of the air cast this week  Pt reports the ankle feels stiff  Pt reports the following difficulties: uneven surfaces  Feels unsteady walking  Denies n/t  Pain  Current pain ratin  At best pain ratin  At worst pain rating: 3  Location: just above the lateral mallelolus of the R ankle    Quality: dull ache, burning and discomfort (snapping)  Relieving factors: rest  Progression: improved    Social Support  Steps to enter house: yes  Lives in: multiple-level home  Lives with: spouse and adult children    Employment status: working ( - goes out on site)  Treatments  Previous treatment: immobilization  Patient Goals  Patient goals for therapy: increased motion, improved balance, increased strength, independence with ADLs/IADLs, return to sport/leisure activities and decreased pain  Patient goal: flexibility in the ankle and stability        Objective     R Ankle AROM  DF= 7  PF = 65  Inv = 50  Ev = 23    L Ankle AROM  DF= 5  PF = 60  Inv = 50  Ev = 10    R ankle MMT  DF= 5  PF = 5 heel raises  Inv = 4+  Ev = 5    SLS  R = 3 sec  L  = 30 sec    Joint mobility assessment  Decreased post glide R distal fib, sitting anterior vs R  Decreased TC AP mobility, PA WNL  Decreased ST eversion    Observation:  Mild edema over R ATFL       Precautions: standard    FOTO   = 57/74      Manuals             ST eversion mobs DS            TC distraction and AP DS            distal fib post glide DS            Tape distal fib into post glide DS            Neuro Re-Ed             SLS 10"x6            Tandem balance 15"x4            Rocker AP static             Rocker AP taps             Tandem on  foam Natalee castellano             S/l [employer]             Ther Ex             [employer] DF with belt/band             Heel raises             [facility]             Step downs                                                                 Ther Activity                                       Gait Training                                       Modalities

## 2021-08-30 NOTE — PROGRESS NOTES
Daily Note     Today's date: 2021  Patient name: Alan Bolanos  : 1965  MRN: [id number]  Referring provider: Lucy Alvarenga DO  Dx:   Encounter Diagnosis     ICD-10-CM    1  Closed avulsion fracture of right ankle, initial encounter  S82 891A    2  Balance problem  R26 89        Start Time: 07  Stop Time: 0745  Total time in clinic (min): 42 minutes    Subjective: States the ankle was "cranky" after the eval  Has been practicing the SLS  Objective: See treatment diary below      Assessment: Pt was challenged by the balance exercises, especially L/R static on the rocker and tandem amb on the 1/2 foam; she required UE support 50% of time to prevent LOB and would benefit from further practice  Plan: Continue per plan of care  Progress treatment as tolerated         Precautions: standard    FOTO   = 57/74      Manuals            ST eversion mobs DS DS           TC distraction and AP DS DS           distal fib post glide DS gentle DS           Tape distal fib into post glide DS DS             Neuro Re-Ed             SLS 10"x6 hep           Tandem balance 15"x4 hep           Rocker AP and L/R  static  30"x3 ea           Rocker AP taps  x30           Tandem amb on 1/2 foam  x2 min           Sand dune marches             S/l RDL             Ther Ex             MWM DF with belt/band             Heel raises  x20 noUE On 1/2 foam          Slant board  x2 min           Step downs             Tip toe walking                                                    Ther Activity                                       Gait Training                                       Modalities

## 2021-08-31 ENCOUNTER — OFFICE VISIT (OUTPATIENT)
Dept: PHYSICAL THERAPY | Facility: REHABILITATION | Age: 56
End: 2021-08-31
Payer: COMMERCIAL

## 2021-08-31 DIAGNOSIS — R26.89 BALANCE PROBLEM: ICD-10-CM

## 2021-08-31 DIAGNOSIS — S82.891A CLOSED AVULSION FRACTURE OF RIGHT ANKLE, INITIAL ENCOUNTER: Primary | ICD-10-CM

## 2021-08-31 PROCEDURE — 97112 NEUROMUSCULAR REEDUCATION: CPT | Performed by: PHYSICAL THERAPIST

## 2021-08-31 PROCEDURE — 97140 MANUAL THERAPY 1/> REGIONS: CPT | Performed by: PHYSICAL THERAPIST

## 2021-09-02 ENCOUNTER — OFFICE VISIT (OUTPATIENT)
Dept: PHYSICAL THERAPY | Facility: REHABILITATION | Age: 56
End: 2021-09-02
Payer: COMMERCIAL

## 2021-09-02 DIAGNOSIS — R26.89 BALANCE PROBLEM: ICD-10-CM

## 2021-09-02 DIAGNOSIS — S82.891A CLOSED AVULSION FRACTURE OF RIGHT ANKLE, INITIAL ENCOUNTER: Primary | ICD-10-CM

## 2021-09-02 PROCEDURE — 97110 THERAPEUTIC EXERCISES: CPT | Performed by: PHYSICAL THERAPIST

## 2021-09-02 PROCEDURE — 97112 NEUROMUSCULAR REEDUCATION: CPT | Performed by: PHYSICAL THERAPIST

## 2021-09-02 PROCEDURE — 97140 MANUAL THERAPY 1/> REGIONS: CPT | Performed by: PHYSICAL THERAPIST

## 2021-09-07 NOTE — PROGRESS NOTES
Daily Note     Today's date: 2021  Patient name: Quinn Lyn  : 1965  MRN: [id number]  Referring provider: Adelina Burdick DO  Dx:   Encounter Diagnosis     ICD-10-CM    1  Closed avulsion fracture of right ankle, initial encounter  S82 891A    2  Balance problem  R26 89        Start Time: 1635  Stop Time: 1715  Total time in clinic (min): 40 minutes    Subjective: Reports stairs are getting easier and the ankle is feeling better every day,    Objective: See treatment diary below  HEP: tandem amb in place of tandem static balance      Assessment: Ankle joint mobility is improving and pt demonstrates less LOB during the balance exercises  Updated HEP as above  Pt is progressing very well  Pt will benefit from continued skilled outpatient PT to improve strength, to address therapy goals, to reduce pain, and improve function  Plan: Continue per plan of care  Progress treatment as tolerated         Precautions: standard    FOTO   = 57/74      Manuals          ST eversion mobs DS DS DS DS         TC distraction and AP DS DS DS DS         distal fib post glide DS gentle DS np          Tape distal fib into post glide DS DS   DS DS         Neuro Re-Ed             SLS 10"x6 hep           Tandem balance 15"x4 hep           Rocker AP and L/R  static  30"x3 ea 30"x3 30"x3         Rocker AP taps  x30 x30 x30         Tandem amb on 1/2 foam  x2 min 2' x2'    Floor 25'x2 - HEP         Sand dune marches             S/l RDL             Ther Ex             MWM DF with belt/band             Heel raises  x20 no UE np On blue  1/2 foam 3x10           Slant board  x2 min 2' 2'         Step downs             Tip toe walking   30"x3 hep                                                Ther Activity                                       Gait Training                                       Modalities

## 2021-09-08 ENCOUNTER — OFFICE VISIT (OUTPATIENT)
Dept: PHYSICAL THERAPY | Facility: REHABILITATION | Age: 56
End: 2021-09-08
Payer: COMMERCIAL

## 2021-09-08 DIAGNOSIS — S82.891A CLOSED AVULSION FRACTURE OF RIGHT ANKLE, INITIAL ENCOUNTER: Primary | ICD-10-CM

## 2021-09-08 DIAGNOSIS — R26.89 BALANCE PROBLEM: ICD-10-CM

## 2021-09-08 PROCEDURE — 97112 NEUROMUSCULAR REEDUCATION: CPT | Performed by: PHYSICAL THERAPIST

## 2021-09-08 PROCEDURE — 97140 MANUAL THERAPY 1/> REGIONS: CPT | Performed by: PHYSICAL THERAPIST

## 2021-09-08 PROCEDURE — 97110 THERAPEUTIC EXERCISES: CPT | Performed by: PHYSICAL THERAPIST

## 2021-09-10 ENCOUNTER — OFFICE VISIT (OUTPATIENT)
Dept: PHYSICAL THERAPY | Facility: REHABILITATION | Age: 56
End: 2021-09-10
Payer: COMMERCIAL

## 2021-09-10 DIAGNOSIS — S82.891A CLOSED AVULSION FRACTURE OF RIGHT ANKLE, INITIAL ENCOUNTER: Primary | ICD-10-CM

## 2021-09-10 DIAGNOSIS — R26.89 BALANCE PROBLEM: ICD-10-CM

## 2021-09-10 PROCEDURE — 97110 THERAPEUTIC EXERCISES: CPT | Performed by: PHYSICAL MEDICINE & REHABILITATION

## 2021-09-10 PROCEDURE — 97140 MANUAL THERAPY 1/> REGIONS: CPT | Performed by: PHYSICAL MEDICINE & REHABILITATION

## 2021-09-10 NOTE — PROGRESS NOTES
Daily Note     Today's date: 9/10/2021  Patient name: Moses Taveras  : 1965  MRN: [id number]  Referring provider: Jennifer Morton DO  Dx:   Encounter Diagnosis     ICD-10-CM    1  Closed avulsion fracture of right ankle, initial encounter  S82 891A    2  Balance problem  R26 89                   Subjective: Patient notes continued overall improvement in sxs  Less "snapping" with tape use  Objective: See treatment diary below    Assessment: Patient challenged by current activity, overall tolerated treatment well without pain provocation  Patient would benefit from continued intervention to address remaining strength and mobility deficits  Plan: Continue per plan of care  Progress treatment as tolerated         Precautions: standard    FOTO   = 57/74    Manuals 8/26 8/31 9/2 9/8 9/10        ST eversion mobs DS DS DS DS LH        TC distraction and AP DS DS DS DS LH        distal fib post glide DS gentle DS np          Tape distal fib into post glide DS DS   DS DS LH        Neuro Re-Ed     9/10        SLS 10"x6 hep           Tandem balance 15"x4 hep           Rocker AP and L/R  static  30"x3 ea 30"x3 30"x3 3x30"        Rocker AP taps  x30 x30 x30 30x        Tandem amb on 1/2 foam  x2 min 2' x2'    Floor 25'x2 - HEP 1/2 foam 4 laps        Sand dune nona             S/l RDL             Ther Ex     9/10        MWM DF with belt/band             Heel raises  x20 no UE np On blue  1/2 foam 3x10   3x10 foam        Slant board  x2 min 2' 2' 2'        Step downs             Tip toe walking   30"x3 hep                                                Ther Activity                                       Gait Training                                       Modalities

## 2021-09-14 ENCOUNTER — OFFICE VISIT (OUTPATIENT)
Dept: PHYSICAL THERAPY | Facility: REHABILITATION | Age: 56
End: 2021-09-14
Payer: COMMERCIAL

## 2021-09-14 DIAGNOSIS — S82.891A CLOSED AVULSION FRACTURE OF RIGHT ANKLE, INITIAL ENCOUNTER: Primary | ICD-10-CM

## 2021-09-14 DIAGNOSIS — R26.89 BALANCE PROBLEM: ICD-10-CM

## 2021-09-14 PROCEDURE — 97110 THERAPEUTIC EXERCISES: CPT | Performed by: PHYSICAL THERAPIST

## 2021-09-14 PROCEDURE — 97112 NEUROMUSCULAR REEDUCATION: CPT | Performed by: PHYSICAL THERAPIST

## 2021-09-14 PROCEDURE — 97140 MANUAL THERAPY 1/> REGIONS: CPT | Performed by: PHYSICAL THERAPIST

## 2021-09-14 NOTE — PROGRESS NOTES
Daily Note     Today's date: 2021  Patient name: Jonathan Goetz  : 1965  MRN: [id number]  Referring provider: Darlene Bass DO  Dx:   Encounter Diagnosis     ICD-10-CM    1  Closed avulsion fracture of right ankle, initial encounter  S82 891A    2  Balance problem  R26 89        Start Time: 07  Stop Time: 0748  Total time in clinic (min): 44 minutes    Subjective: Pt reports her SLS is getting easier    Objective: See treatment diary below  Updated hep: SLS on foam    Assessment: Pt was appropriately challenged by progression of SLS to airex foam as well as addition of s/l supermans  Ankle mobility continues to improve and pts balance and strength are improving  FOTO score increase also supports these improvements  Plan: Continue per plan of care  Progress treatment as tolerated         Precautions: standard    FOTO   = 57/74   = 61/74    Manuals 8/26 8/31 9/2 9/8 9/10 9/14       ST eversion mobs DS DS DS DS LH DS       TC distraction and AP DS DS DS DS LH DS       distal fib post glide DS gentle DS np          Tape distal fib into post glide DS DS   DS DS LH DS       Neuro Re-Ed     9/10        SLS 10"x6 hep    airex 10"x6  hep       Tandem balance 15"x4 hep           Rocker AP and L/R  static  30"x3 ea 30"x3 30"x3 3x30" 3x30" ea       Rocker AP taps  x30 x30 x30 30x x30       Tandem amb on 1/2 foam  x2 min 2' x2'    Floor 25'x2 - HEP 1/2 foam 4 laps 1/2 foam 2'       Sand dune marches      nv       S/l  Forward reach "superman"      3x5       Ther Ex     9/10        MWM DF with belt/band             Heel raises  x20 no UE np On blue  1/2 foam 3x10   3x10 foam On blue  1/2 foam 3x10       Slant board  x2 min 2' 2' 2' 2'       Step downs             Tip toe walking   30"x3 hep                                                Ther Activity                                       Gait Training                                       Modalities

## 2021-09-16 ENCOUNTER — OFFICE VISIT (OUTPATIENT)
Dept: PHYSICAL THERAPY | Facility: REHABILITATION | Age: 56
End: 2021-09-16
Payer: COMMERCIAL

## 2021-09-16 DIAGNOSIS — R26.89 BALANCE PROBLEM: ICD-10-CM

## 2021-09-16 DIAGNOSIS — S82.891A CLOSED AVULSION FRACTURE OF RIGHT ANKLE, INITIAL ENCOUNTER: Primary | ICD-10-CM

## 2021-09-16 PROCEDURE — 97140 MANUAL THERAPY 1/> REGIONS: CPT | Performed by: PHYSICAL THERAPIST

## 2021-09-16 PROCEDURE — 97112 NEUROMUSCULAR REEDUCATION: CPT | Performed by: PHYSICAL THERAPIST

## 2021-09-16 PROCEDURE — 97110 THERAPEUTIC EXERCISES: CPT | Performed by: PHYSICAL THERAPIST

## 2021-09-20 ENCOUNTER — APPOINTMENT (OUTPATIENT)
Dept: PHYSICAL THERAPY | Facility: REHABILITATION | Age: 56
End: 2021-09-20
Payer: COMMERCIAL

## 2021-09-20 NOTE — PROGRESS NOTES
Daily Note     Today's date: 2021  Patient name: Brian Freitas  : 1965  MRN: [id number]  Referring provider: Cb Dean DO  Dx:   Encounter Diagnosis     ICD-10-CM    1  Closed avulsion fracture of right ankle, initial encounter  S82 891A    2  Balance problem  R26 89        Start Time: 3984  Stop Time: 0553  Total time in clinic (min): 42 minutes    Subjective: Pt reports her ankle is getting stronger  Still gets the snapping sensation along the lateral malleolus but not as intense  Objective: See treatment diary below  Updated hep: tip toe walking, MWM with strap DF stretch, tandem amb, supermans    Assessment: Trialed no taping during tx today  Pt was able to perform heel raises without the snapping sensation  Pt demonstrates improving balance and stability  She sill required 1 UE 75% of the time during tandem amb on 1/2 foam, but minimal UE support during rocker exercises  Held on taping post txl as pt did well with the exercises t/out the session  Trialed standing soleus stretch, but pt noted anterior ankle discomfort  Modified to a mobilization with movement DF stretch; pt responded well  Provided pt with plum band and added DF stretch MWM to HEP  Plan: Continue per plan of care  Progress treatment as tolerated         Precautions: standard    FOTO   = 57/74   = 61/74    Manuals 8/26 8/31 9/2 9/8 9/10 9/14 9/16 9/21     ST eversion mobs DS DS DS DS LH DS DS DS     TC distraction and AP DS DS DS DS LH DS DS DS     distal fib post glide DS gentle DS np          Tape distal fib into post glide DS DS   DS DS LH DS DS np     Neuro Re-Ed     9/10        SLS 10"x6 hep    airex 10"x6  hep       Tandem balance 15"x4 hep           Rocker AP and L/R  static  30"x3 ea 30"x3 30"x3 3x30" 3x30" ea 3x30" ea 3x30" ea     Rocker AP taps  x30 x30 x30 30x x30 x30 x30     Tandem amb on 1/2 foam  x2 min 2' x2'    Floor 25'x2 - HEP 1/2 foam 4 laps 1/2 foam 2' 1/2 foam 2' 1/2 foam 2'     Sand [employer] 2 min 2 min     S/l  Forward reach "superman"      3x5 3x5 3x5     Ther Ex     9/10        MWM DF with belt/band             Heel raises  x20 no UE np On blue  1/2 foam 3x10   3x10 foam On blue  1/2 foam 3x10 On blue  1/2 foam 3x10 On blue  1/2 foam 3x10     Slant board  x2 min 2' 2' 2' 2' 2' 2'     Step downs        RLE x15     Tip toe walking   30"x3 hep         Soleus stretch on slant board        trialed, pain     MWM DF stretch        Purple loop 30"x3, 8" step                  Ther Activity                                       Gait Training                                       Modalities

## 2021-09-21 ENCOUNTER — OFFICE VISIT (OUTPATIENT)
Dept: PHYSICAL THERAPY | Facility: REHABILITATION | Age: 56
End: 2021-09-21
Payer: COMMERCIAL

## 2021-09-21 DIAGNOSIS — S82.891A CLOSED AVULSION FRACTURE OF RIGHT ANKLE, INITIAL ENCOUNTER: Primary | ICD-10-CM

## 2021-09-21 DIAGNOSIS — R26.89 BALANCE PROBLEM: ICD-10-CM

## 2021-09-21 PROCEDURE — 97110 THERAPEUTIC EXERCISES: CPT | Performed by: PHYSICAL THERAPIST

## 2021-09-21 PROCEDURE — 97140 MANUAL THERAPY 1/> REGIONS: CPT | Performed by: PHYSICAL THERAPIST

## 2021-09-21 PROCEDURE — 97112 NEUROMUSCULAR REEDUCATION: CPT | Performed by: PHYSICAL THERAPIST

## 2021-09-23 NOTE — PROGRESS NOTES
Daily Note     Today's date: 2021  Patient name: Jonathan Goetz  : 1965  MRN: [id number]  Referring provider: Darlene Bass DO  Dx:   Encounter Diagnosis     ICD-10-CM    1  Closed avulsion fracture of right ankle, initial encounter  S82 891A    2  Balance problem  R26 89        Start Time: 0745  Stop Time: 0830  Total time in clinic (min): 45 minutes    Subjective: Pt reports she rolled her ankle yesterday when she stepped on an acorn  No pain, but was aware of it  States the ankle feels stiff this morning  Objective: See treatment diary below  Updated hep: tip toe walking, MWM with strap DF stretch, tandem amb, supermans    Assessment: Pt is most challenged by L/R rocker balance and supermans  Ankle DF ROM is improving but still limited  Plan: Continue per plan of care  Progress treatment as tolerated         Precautions: standard    FOTO   = 57/74   = 61/74    Manuals 8/26 8/31 9/2 9/8 9/10 9/14 9/16 9/21 9/24    ST eversion mobs DS DS DS DS LH DS DS DS DS    TC distraction and AP DS DS DS DS LH DS DS DS DS    distal fib post glide DS gentle DS np          Tape distal fib into post glide DS DS   DS DS LH DS DS np     Neuro Re-Ed     9/10        SLS 10"x6 hep    airex 10"x6  hep       Tandem balance 15"x4 hep           Rocker AP and L/R  static  30"x3 ea 30"x3 30"x3 3x30" 3x30" ea 3x30" ea 3x30" ea 3x30" ea    Rocker AP taps  x30 x30 x30 30x x30 x30 x30 x30    Tandem amb on 1/2 foam  x2 min 2' x2'    Floor 25'x2 - HEP 1/2 foam 4 laps 1/2 foam 2' 1/2 foam 2' 1/2 foam 2' 1/2 foam 2'    Sand dune marches      nv 2 min 2 min 2min    S/l  Forward reach "superman"      3x5 3x5 3x5 3x5    Ther Ex     9/10        Heel raises  x20 no UE np On blue  1/2 foam 3x10   3x10 foam On blue  1/2 foam 3x10 On blue  1/2 foam 3x10 On blue  1/2 foam 3x10 On blue  1/2 foam 3x10    Slant board  x2 min 2' 2' 2' 2' 2' 2' 2'    Step downs        RLE x15 RLE 8" x20    Tip toe walking   30"x3 hep         Soleus stretch on slant board        trialed, pain     MWM DF stretch        Purple loop 30"x3, 8" step Purple loop 30"x3, 8" step                 Ther Activity                                       Gait Training                                       Modalities

## 2021-09-24 ENCOUNTER — OFFICE VISIT (OUTPATIENT)
Dept: PHYSICAL THERAPY | Facility: REHABILITATION | Age: 56
End: 2021-09-24
Payer: COMMERCIAL

## 2021-09-24 DIAGNOSIS — S82.891A CLOSED AVULSION FRACTURE OF RIGHT ANKLE, INITIAL ENCOUNTER: Primary | ICD-10-CM

## 2021-09-24 DIAGNOSIS — R26.89 BALANCE PROBLEM: ICD-10-CM

## 2021-09-24 PROCEDURE — 97110 THERAPEUTIC EXERCISES: CPT | Performed by: PHYSICAL THERAPIST

## 2021-09-24 PROCEDURE — 97140 MANUAL THERAPY 1/> REGIONS: CPT | Performed by: PHYSICAL THERAPIST

## 2021-09-24 PROCEDURE — 97112 NEUROMUSCULAR REEDUCATION: CPT | Performed by: PHYSICAL THERAPIST

## 2021-09-27 ENCOUNTER — OFFICE VISIT (OUTPATIENT)
Dept: PHYSICAL THERAPY | Facility: REHABILITATION | Age: 56
End: 2021-09-27
Payer: COMMERCIAL

## 2021-09-27 DIAGNOSIS — R26.89 BALANCE PROBLEM: ICD-10-CM

## 2021-09-27 DIAGNOSIS — S82.891A CLOSED AVULSION FRACTURE OF RIGHT ANKLE, INITIAL ENCOUNTER: Primary | ICD-10-CM

## 2021-09-27 PROCEDURE — 97140 MANUAL THERAPY 1/> REGIONS: CPT | Performed by: PHYSICAL THERAPIST

## 2021-09-27 PROCEDURE — 97112 NEUROMUSCULAR REEDUCATION: CPT | Performed by: PHYSICAL THERAPIST

## 2021-09-27 PROCEDURE — 97110 THERAPEUTIC EXERCISES: CPT | Performed by: PHYSICAL THERAPIST

## 2021-09-27 NOTE — PROGRESS NOTES
Daily Note     Today's date: 2021  Patient name: Jinny Garcia  : 1965  MRN: [id number]  Referring provider: Renee Vaughan DO  Dx:   Encounter Diagnosis     ICD-10-CM    1  Closed avulsion fracture of right ankle, initial encounter  S82 891A    2  Balance problem  R26 89        Start Time: 933  Stop Time: 1012  Total time in clinic (min): 39 minutes    Subjective: Pt reports her ankle gets sore at times  Objective: See treatment diary below  Updated hep: tip toe walking, MWM with strap DF stretch, tandem amb, supermans    Assessment: Pt continues to make improvements in balance/stability  She has less LOB during superman's as well as rocker balance  Plan: Continue per plan of care  Progress treatment as tolerated         Precautions: standard    FOTO   = 57/74   = 61/74    Manuals 8/26 8/31 9/2 9/8 9/10 9/14 9/16 9/21 9/24 9/27   ST eversion mobs DS DS DS DS LH DS DS DS DS DS   TC distraction and AP DS DS DS DS LH DS DS DS DS DS   distal fib post glide DS gentle DS np          Tape distal fib into post glide DS DS   DS DS LH DS DS np     Neuro Re-Ed     9/10        SLS 10"x6 hep    airex 10"x6  hep       Tandem balance 15"x4 hep           Rocker AP and L/R  static  30"x3 ea 30"x3 30"x3 3x30" 3x30" ea 3x30" ea 3x30" ea 3x30" ea 3x30" ea   Rocker AP taps  x30 x30 x30 30x x30 x30 x30 x30 x30   Tandem amb on 1/2 foam  x2 min 2' x2'    Floor 25'x2 - HEP 1/2 foam 4 laps 1/2 foam 2' 1/2 foam 2' 1/2 foam 2' 1/2 foam 2' 1/2 foam 2'   Sand dune marches      nv 2 min 2 min 2min 2'   S/l  Forward reach "superman"      3x5 3x5 3x5 3x5 3x10   Ther Ex     /10        Heel raises  x20 no UE np On blue  1/2 foam 3x10   3x10 foam On blue  1/2 foam 3x10 On blue  1/2 foam 3x10 On blue  1/2 foam 3x10 On blue  1/2 foam 3x10 On blue  1/2 foam 3x10   Slant board  x2 min 2' 2' 2' 2' 2' 2' 2' 2'   Step downs        RLE x15 RLE 8" x20 RLE 8" x20   Tip toe walking   30"x3 hep         Soleus stretch on slant board        trialed, pain     MWM DF stretch        Purple loop 30"x3, 8" step Purple loop 30"x3, 8" step Purple loop 30"x3, 8" step                Ther Activity                                       Gait Training                                       Modalities

## 2021-09-30 ENCOUNTER — OFFICE VISIT (OUTPATIENT)
Dept: PHYSICAL THERAPY | Facility: REHABILITATION | Age: 56
End: 2021-09-30
Payer: COMMERCIAL

## 2021-09-30 DIAGNOSIS — R26.89 BALANCE PROBLEM: ICD-10-CM

## 2021-09-30 DIAGNOSIS — S82.891A CLOSED AVULSION FRACTURE OF RIGHT ANKLE, INITIAL ENCOUNTER: Primary | ICD-10-CM

## 2021-09-30 PROCEDURE — 97112 NEUROMUSCULAR REEDUCATION: CPT | Performed by: PHYSICAL THERAPIST

## 2021-09-30 PROCEDURE — 97110 THERAPEUTIC EXERCISES: CPT | Performed by: PHYSICAL THERAPIST

## 2021-09-30 PROCEDURE — 97140 MANUAL THERAPY 1/> REGIONS: CPT | Performed by: PHYSICAL THERAPIST

## 2021-09-30 NOTE — PROGRESS NOTES
Daily Note     Today's date: 2021  Patient name: Maria T Carmona  : 1965  MRN: [id number]  Referring provider: Abbey Hunt DO  Dx:   Encounter Diagnosis     ICD-10-CM    1  Closed avulsion fracture of right ankle, initial encounter  S82 891A    2  Balance problem  R26 89        Start Time: 0802  Stop Time: 1081  Total time in clinic (min): 48 minutes    Subjective: Pt reports she rolled her ankle this morning  States it isnt swollen and doesn't feel like she hurt it, but is frustrated that she rolled it  Objective: See treatment diary below  Updated hep: tandem on airex,  MWM with strap DF stretch, bosu step ups, supermans    Assessment: No swelling or TTP noted in the R ankle  Taped the ankle post tx to provide extra support and stability due to pt rolling the ankle again this morning  Pt was able to complete all exercises without an increase in pain  She was challenged by bosu step ups and would benefit from continued practice  Updated HEP as above  Plan: Continue per plan of care  Progress treatment as tolerated         Precautions: standard    FOTO   = 57/74   = 61/74    Manuals    ST eversion mobs DS        DS DS   TC distraction and AP DS        DS DS   distal fib post glide DS            Tape distal fib into post glide DS            Neuro Re-Ed             Rocker AP and L/R  static 3x30" ea        3x30" ea 3x30" ea   Rocker AP taps x30        x30 x30   Tandem amb on 1/2 foam 1/2 foam 2'        1/2 foam 2' 1/2 foam 2'   Sand dune marches 2'        2min 2'   Tandem on airex 30"x4            S/l  Forward reach "superman" hep        3x5 3x10   bosu step up 3x10            Ther Ex             Heel raises On blue  1/2 foam 3x10        On blue  1/2 foam 3x10 On blue  1/2 foam 3x10   Slant board         2' 2'   Step downs nv        RLE 8" x20 RLE 8" x20   MWM DF stretch hep        Purple loop 30"x3, 8" step Purple loop 30"x3, 8" step                Ther Activity Gait Training                                       Modalities

## 2021-10-01 ENCOUNTER — IMMUNIZATIONS (OUTPATIENT)
Dept: FAMILY MEDICINE CLINIC | Facility: CLINIC | Age: 56
End: 2021-10-01
Payer: COMMERCIAL

## 2021-10-01 DIAGNOSIS — Z23 ENCOUNTER FOR IMMUNIZATION: Primary | ICD-10-CM

## 2021-10-01 PROCEDURE — 90471 IMMUNIZATION ADMIN: CPT

## 2021-10-01 PROCEDURE — 90682 RIV4 VACC RECOMBINANT DNA IM: CPT

## 2021-10-05 ENCOUNTER — OFFICE VISIT (OUTPATIENT)
Dept: PHYSICAL THERAPY | Facility: REHABILITATION | Age: 56
End: 2021-10-05
Payer: COMMERCIAL

## 2021-10-05 DIAGNOSIS — R26.89 BALANCE PROBLEM: ICD-10-CM

## 2021-10-05 DIAGNOSIS — S82.891A CLOSED AVULSION FRACTURE OF RIGHT ANKLE, INITIAL ENCOUNTER: Primary | ICD-10-CM

## 2021-10-05 PROCEDURE — 97112 NEUROMUSCULAR REEDUCATION: CPT | Performed by: PHYSICAL THERAPIST

## 2021-10-05 PROCEDURE — 97140 MANUAL THERAPY 1/> REGIONS: CPT | Performed by: PHYSICAL THERAPIST

## 2021-10-07 ENCOUNTER — OFFICE VISIT (OUTPATIENT)
Dept: PHYSICAL THERAPY | Facility: REHABILITATION | Age: 56
End: 2021-10-07
Payer: COMMERCIAL

## 2021-10-07 DIAGNOSIS — R26.89 BALANCE PROBLEM: ICD-10-CM

## 2021-10-07 DIAGNOSIS — S82.891A CLOSED AVULSION FRACTURE OF RIGHT ANKLE, INITIAL ENCOUNTER: Primary | ICD-10-CM

## 2021-10-07 PROCEDURE — 97112 NEUROMUSCULAR REEDUCATION: CPT | Performed by: PHYSICAL THERAPIST

## 2021-10-07 PROCEDURE — 97110 THERAPEUTIC EXERCISES: CPT | Performed by: PHYSICAL THERAPIST

## 2021-10-07 PROCEDURE — 97140 MANUAL THERAPY 1/> REGIONS: CPT | Performed by: PHYSICAL THERAPIST

## 2021-10-11 ENCOUNTER — OFFICE VISIT (OUTPATIENT)
Dept: PHYSICAL THERAPY | Facility: REHABILITATION | Age: 56
End: 2021-10-11
Payer: COMMERCIAL

## 2021-10-11 ENCOUNTER — HOSPITAL ENCOUNTER (OUTPATIENT)
Dept: BONE DENSITY | Facility: MEDICAL CENTER | Age: 56
Discharge: HOME | End: 2021-10-11
Payer: COMMERCIAL

## 2021-10-11 DIAGNOSIS — S82.891A CLOSED AVULSION FRACTURE OF RIGHT ANKLE, INITIAL ENCOUNTER: Primary | ICD-10-CM

## 2021-10-11 DIAGNOSIS — R26.89 BALANCE PROBLEM: ICD-10-CM

## 2021-10-11 DIAGNOSIS — S82.891A CLOSED AVULSION FRACTURE OF RIGHT ANKLE, INITIAL ENCOUNTER: ICD-10-CM

## 2021-10-11 PROCEDURE — 77080 DXA BONE DENSITY AXIAL: CPT

## 2021-10-11 PROCEDURE — 97140 MANUAL THERAPY 1/> REGIONS: CPT | Performed by: PHYSICAL THERAPIST

## 2021-10-11 PROCEDURE — 97110 THERAPEUTIC EXERCISES: CPT | Performed by: PHYSICAL THERAPIST

## 2021-10-11 PROCEDURE — 97112 NEUROMUSCULAR REEDUCATION: CPT | Performed by: PHYSICAL THERAPIST

## 2021-10-22 ENCOUNTER — OFFICE VISIT (OUTPATIENT)
Dept: PHYSICAL THERAPY | Facility: REHABILITATION | Age: 56
End: 2021-10-22
Payer: COMMERCIAL

## 2021-10-22 DIAGNOSIS — S82.891A CLOSED AVULSION FRACTURE OF RIGHT ANKLE, INITIAL ENCOUNTER: Primary | ICD-10-CM

## 2021-10-22 DIAGNOSIS — R26.89 BALANCE PROBLEM: ICD-10-CM

## 2021-10-22 PROCEDURE — 97112 NEUROMUSCULAR REEDUCATION: CPT | Performed by: PHYSICAL THERAPIST

## 2021-10-22 PROCEDURE — 97140 MANUAL THERAPY 1/> REGIONS: CPT | Performed by: PHYSICAL THERAPIST

## 2021-10-22 PROCEDURE — 97110 THERAPEUTIC EXERCISES: CPT | Performed by: PHYSICAL THERAPIST

## 2021-10-25 ENCOUNTER — CLINICAL SUPPORT (OUTPATIENT)
Dept: FAMILY MEDICINE CLINIC | Facility: CLINIC | Age: 56
End: 2021-10-25
Payer: COMMERCIAL

## 2021-10-25 DIAGNOSIS — Z23 NEED FOR VACCINATION: Primary | ICD-10-CM

## 2021-10-25 PROCEDURE — 90471 IMMUNIZATION ADMIN: CPT

## 2021-10-25 PROCEDURE — 90750 HZV VACC RECOMBINANT IM: CPT

## 2021-10-29 ENCOUNTER — EVALUATION (OUTPATIENT)
Dept: PHYSICAL THERAPY | Facility: REHABILITATION | Age: 56
End: 2021-10-29
Payer: COMMERCIAL

## 2021-10-29 DIAGNOSIS — R26.89 BALANCE PROBLEM: ICD-10-CM

## 2021-10-29 DIAGNOSIS — S82.891A CLOSED AVULSION FRACTURE OF RIGHT ANKLE, INITIAL ENCOUNTER: Primary | ICD-10-CM

## 2021-10-29 PROCEDURE — 97140 MANUAL THERAPY 1/> REGIONS: CPT | Performed by: PHYSICAL MEDICINE & REHABILITATION

## 2021-10-29 PROCEDURE — 97110 THERAPEUTIC EXERCISES: CPT | Performed by: PHYSICAL MEDICINE & REHABILITATION

## 2021-11-15 ENCOUNTER — HOSPITAL ENCOUNTER (OUTPATIENT)
Dept: MAMMOGRAPHY | Facility: HOSPITAL | Age: 56
Discharge: HOME | End: 2021-11-15
Attending: OBSTETRICS & GYNECOLOGY
Payer: COMMERCIAL

## 2021-11-15 VITALS — BODY MASS INDEX: 38.26 KG/M2 | WEIGHT: 207.89 LBS | HEIGHT: 62 IN

## 2021-11-15 DIAGNOSIS — Z12.31 ENCOUNTER FOR SCREENING MAMMOGRAM FOR MALIGNANT NEOPLASM OF BREAST: ICD-10-CM

## 2021-11-15 PROCEDURE — 77067 SCR MAMMO BI INCL CAD: CPT

## 2021-11-15 PROCEDURE — 77063 BREAST TOMOSYNTHESIS BI: CPT

## 2021-12-10 ENCOUNTER — CLINICAL SUPPORT (OUTPATIENT)
Dept: FAMILY MEDICINE CLINIC | Facility: CLINIC | Age: 56
End: 2021-12-10

## 2021-12-10 DIAGNOSIS — B34.9 VIRAL ILLNESS: Primary | ICD-10-CM

## 2021-12-10 LAB — SARS-COV-2 RNA RESP QL NAA+PROBE: NEGATIVE

## 2021-12-10 PROCEDURE — U0005 INFEC AGEN DETEC AMPLI PROBE: HCPCS | Performed by: NURSE PRACTITIONER

## 2021-12-10 PROCEDURE — U0003 INFECTIOUS AGENT DETECTION BY NUCLEIC ACID (DNA OR RNA); SEVERE ACUTE RESPIRATORY SYNDROME CORONAVIRUS 2 (SARS-COV-2) (CORONAVIRUS DISEASE [COVID-19]), AMPLIFIED PROBE TECHNIQUE, MAKING USE OF HIGH THROUGHPUT TECHNOLOGIES AS DESCRIBED BY CMS-2020-01-R: HCPCS | Performed by: NURSE PRACTITIONER

## 2021-12-13 ENCOUNTER — TELEPHONE (OUTPATIENT)
Dept: OBGYN CLINIC | Facility: OTHER | Age: 56
End: 2021-12-13

## 2022-04-07 ENCOUNTER — HOSPITAL ENCOUNTER (OUTPATIENT)
Dept: RADIOLOGY | Facility: HOSPITAL | Age: 57
Discharge: HOME | End: 2022-04-07
Attending: INTERNAL MEDICINE
Payer: COMMERCIAL

## 2022-04-07 DIAGNOSIS — E04.2 MULTIPLE THYROID NODULES: ICD-10-CM

## 2022-04-07 LAB
T4 FREE SERPL-MCNC: 1.2 NG/DL (ref 0.8–1.8)
TSH SERPL-ACNC: 1.05 MIU/L (ref 0.4–4.5)

## 2022-04-07 PROCEDURE — 76536 US EXAM OF HEAD AND NECK: CPT

## 2022-04-29 ENCOUNTER — OFFICE VISIT (OUTPATIENT)
Dept: ENDOCRINOLOGY | Facility: HOSPITAL | Age: 57
End: 2022-04-29
Payer: COMMERCIAL

## 2022-04-29 VITALS
SYSTOLIC BLOOD PRESSURE: 122 MMHG | OXYGEN SATURATION: 98 % | WEIGHT: 184.4 LBS | DIASTOLIC BLOOD PRESSURE: 78 MMHG | HEART RATE: 60 BPM | HEIGHT: 65 IN | BODY MASS INDEX: 30.72 KG/M2

## 2022-04-29 DIAGNOSIS — E04.2 MULTIPLE THYROID NODULES: Primary | ICD-10-CM

## 2022-04-29 PROCEDURE — 3008F BODY MASS INDEX DOCD: CPT | Performed by: INTERNAL MEDICINE

## 2022-04-29 PROCEDURE — 99213 OFFICE O/P EST LOW 20 MIN: CPT | Performed by: INTERNAL MEDICINE

## 2022-04-29 PROCEDURE — 1036F TOBACCO NON-USER: CPT | Performed by: INTERNAL MEDICINE

## 2022-04-29 NOTE — PROGRESS NOTES
2022    Assessment/Plan      There are no diagnoses linked to this encounter  Assessment/Plan:  Thyroid nodules: Recent TSH and free T4 normal   Thyroid ultrasound shows stability of the nodules  Repeat blood work and thyroid ultrasound prior to next appointment which will be in 1 year  She will call me sooner with any neck compressive symptom concerns  CC: Follow-up    History of Present Illness     HPI: Rosas Raymundo is a 61y o  year old female with history of thyroid nodules who presents for a follow-up appointment  She does have a family history of hyperthyroidism in her grandmother and hypothyroidism in her mother  No known family history of thyroid cancer  There is no personal history of head or neck radiation the patient  No neck compressive symptoms  Presents today overall feeling well  Concerns regarding dysphagia, odynophagia, hoarseness  Overall feeling well  Review of Systems   Constitutional: Negative for fatigue  HENT: Negative for trouble swallowing and voice change  Eyes: Negative for visual disturbance  Respiratory: Negative for shortness of breath  Cardiovascular: Negative for palpitations and leg swelling  Gastrointestinal: Negative for abdominal pain, nausea and vomiting  Endocrine: Negative for polydipsia and polyuria  Musculoskeletal: Negative for arthralgias and myalgias  Skin: Negative for rash  Neurological: Negative for dizziness, tremors and weakness  Hematological: Negative for adenopathy  Psychiatric/Behavioral: Negative for agitation and confusion         Historical Information   Past Medical History:   Diagnosis Date    Blood type, Rh negative     Ganglion cyst of wrist, right     Overweight      Past Surgical History:   Procedure Laterality Date     SECTION, LOW TRANSVERSE      CHROMOSOME ANALYSIS, PRODUCTS OF CONCEPTION (HISTORICAL)      surgical treatment of missed     COLONOSCOPY      INDUCED       by dilation and evacuation    WISDOM TOOTH EXTRACTION       Social History   Social History     Substance and Sexual Activity   Alcohol Use Yes    Alcohol/week: 2 0 standard drinks    Types: 2 Cans of beer per week     Social History     Substance and Sexual Activity   Drug Use No     Social History     Tobacco Use   Smoking Status Never Smoker   Smokeless Tobacco Never Used     Family History:   Family History   Problem Relation Age of Onset    Hypertension Mother     Throat cancer Mother         laryngeal    Stroke Mother         syndrome    Substance Abuse Mother     Thyroid disease Mother     Lung cancer Mother     Esophageal cancer Father     Stroke Father         syndrome    Hypertension Maternal Grandmother     Thyroid disease Maternal Grandmother     Hypertension Maternal Grandfather     Stroke Maternal Grandfather         syndrome    No Known Problems Daughter     Other Son         racing heart rhythm    Emphysema Paternal Grandmother     Alzheimer's disease Paternal Grandfather     Heart murmur Son     Ovarian cancer Half-Sister     No Known Problems Half-Sister     No Known Problems Half-Brother     No Known Problems Half-Brother     Mental illness Neg Hx        Meds/Allergies   Current Outpatient Medications   Medication Sig Dispense Refill    Coenzyme Q10 (COQ10) 100 MG CAPS Take by mouth      hydrocortisone (WESTCORT) 0 2 % cream Apply topically 2 (two) times a day 45 g 3    patient supplied medication  Formulated Probiotics       No current facility-administered medications for this visit  No Known Allergies    Objective   Vitals: There were no vitals taken for this visit  Invasive Devices  Report    None                 Physical Exam  Vitals reviewed  Constitutional:       General: She is not in acute distress  Appearance: She is well-developed  She is not diaphoretic  HENT:      Head: Normocephalic and atraumatic     Eyes:      Conjunctiva/sclera: Conjunctivae normal       Pupils: Pupils are equal, round, and reactive to light  Neck:      Thyroid: No thyromegaly  Cardiovascular:      Rate and Rhythm: Normal rate and regular rhythm  Pulmonary:      Effort: Pulmonary effort is normal  No respiratory distress  Breath sounds: Normal breath sounds  Abdominal:      General: Bowel sounds are normal       Palpations: Abdomen is soft  Musculoskeletal:         General: Normal range of motion  Cervical back: Normal range of motion and neck supple  Skin:     General: Skin is warm and dry  Findings: No rash  Neurological:      Mental Status: She is alert and oriented to person, place, and time  Motor: No abnormal muscle tone  Psychiatric:         Behavior: Behavior normal          The history was obtained from the review of the chart and from the patient      Lab Results:      Recent Results (from the past 95112 hour(s))   T4, free    Collection Time: 04/27/21  7:04 AM   Result Value Ref Range    Free t4 1 1 0 8 - 1 8 ng/dL   TSH, 3rd generation    Collection Time: 04/27/21  7:04 AM   Result Value Ref Range    TSH 2 44 0 40 - 4 50 mIU/L   Novel Coronavirus (COVID-19), PCR [facility] Collected in Office    Collection Time: 12/10/21  8:57 AM    Specimen: Nose; Nares   Result Value Ref Range    SARS-CoV-2 Negative Negative   T4, free    Collection Time: 04/07/22 12:45 PM   Result Value Ref Range    Free t4 1 2 0 8 - 1 8 ng/dL   TSH, 3rd generation    Collection Time: 04/07/22 12:45 PM   Result Value Ref Range    TSH 1 05 0 40 - 4 50 mIU/L     4/7/22:  THYROID ULTRASOUND     INDICATION:    E04 2: Nontoxic multinodular goiter      COMPARISON:  Ultrasound 4/26/2021 and priors     TECHNIQUE:   Ultrasound of the thyroid was performed with a high frequency linear transducer in transverse and sagittal planes including volumetric imaging sweeps as well as traditional still imaging technique      FINDINGS:  Normal homogeneous smooth echotexture      Right lobe: 4 5 x 1 1 x 1 7 cm  Volume 3 8 mL  Left lobe:  4 3 x 1 2 x 1 4 cm  Volume 3 4 mL  Isthmus: 0 3  cm      Nodule #1  Image 49  RIGHT midgland nodule measuring 0 5 x 0 4 x 0 5 cm  Given differences in measuring technique, no significant change from prior  Prior measurement 0 5 x 0 6 x 0 5 cm  COMPOSITION:  2 points, solid or almost completely solid   ECHOGENICITY:  2 points, hypoechoic  SHAPE:  0 points, wider-than-tall  MARGIN: 2 points, lobulated or irregular  ECHOGENIC FOCI:  0 points, none or large comet-tail artifacts  TI-RADS Classification: TR 4 (4-6 points), FNA if > 1 5 cm  Follow if > 1cm      Nodule #2  Image 57  Right isthmus nodule measuring 0 9 x 0 6 x 0 8 cm  Given differences in measuring technique, no significant change from prior  Prior measurement 0 9 x 0 6 x 0 point centimeters  COMPOSITION:  2 points, solid or almost completely solid   ECHOGENICITY:  2 points, hypoechoic  SHAPE:  0 points, wider-than-tall  MARGIN: 0 points, smooth  ECHOGENIC FOCI:  0 points, none or large comet-tail artifacts  TI-RADS Classification: TR 4 (4-6 points), FNA if > 1 5 cm  Follow if > 1cm           IMPRESSION:     No nodule meets current ACR criteria for requiring biopsy or followup ultrasounds  If clinically warranted, 12-24 month ultrasound follow-up may also be considered            Reference: ACR Thyroid Imaging, Reporting and Data System (TI-RADS): White Paper of the [employer]  J AM Raj Radiol [id number]  (additional recommendations based on American Thyroid Association 2015 guidelines )        Workstation performed: [id number]    Future Appointments   Date Time Provider Jolanta Harmon   8/4/2022  1:00 PM Saray Olsen MD [address] [place] [address]   8/26/2022  3:00 PM Melissa Armijo DO [facility]       Portions of the record may have been created with voice recognition software   Occasional wrong word or "sound a like" substitutions may have occurred due to the inherent limitations of voice recognition software  Read the chart carefully and recognize, using context, where substitutions have occurred

## 2022-07-12 ENCOUNTER — VBI (OUTPATIENT)
Dept: ADMINISTRATIVE | Facility: OTHER | Age: 57
End: 2022-07-12

## 2022-08-04 ENCOUNTER — ANNUAL EXAM (OUTPATIENT)
Dept: OBGYN CLINIC | Facility: CLINIC | Age: 57
End: 2022-08-04
Payer: COMMERCIAL

## 2022-08-04 VITALS
SYSTOLIC BLOOD PRESSURE: 140 MMHG | BODY MASS INDEX: 29.16 KG/M2 | DIASTOLIC BLOOD PRESSURE: 76 MMHG | HEIGHT: 65 IN | WEIGHT: 175 LBS

## 2022-08-04 DIAGNOSIS — Z12.31 ENCOUNTER FOR SCREENING MAMMOGRAM FOR MALIGNANT NEOPLASM OF BREAST: ICD-10-CM

## 2022-08-04 DIAGNOSIS — Z01.419 ENCNTR FOR GYN EXAM (GENERAL) (ROUTINE) W/O ABN FINDINGS: Primary | ICD-10-CM

## 2022-08-04 DIAGNOSIS — Z11.51 SCREENING FOR HUMAN PAPILLOMAVIRUS (HPV): ICD-10-CM

## 2022-08-04 PROCEDURE — G0145 SCR C/V CYTO,THINLAYER,RESCR: HCPCS | Performed by: OBSTETRICS & GYNECOLOGY

## 2022-08-04 PROCEDURE — 99396 PREV VISIT EST AGE 40-64: CPT | Performed by: OBSTETRICS & GYNECOLOGY

## 2022-08-04 PROCEDURE — G0476 HPV COMBO ASSAY CA SCREEN: HCPCS | Performed by: OBSTETRICS & GYNECOLOGY

## 2022-08-04 PROCEDURE — 0503F POSTPARTUM CARE VISIT: CPT | Performed by: OBSTETRICS & GYNECOLOGY

## 2022-08-04 NOTE — PROGRESS NOTES
Darlene Marquez   1965    CC:  Yearly exam    S:  61 y o  female here for yearly exam  She is postmenopausal and has had no vaginal bleeding  She denies vaginal discharge, itching, odor or dryness  She has lost 15 lbs over the last 6 months by cutting sugar out of her diet with her   They both read the Obesity Code after our discussion last year and are grateful for the advice  Congratulated her and encouraged her to continue  Sexual activity: She is sexually active without pain, bleeding or dryness  Last Pap: 6/13/2018 - normal/negative HPV  Last Mammo: 11/15/2021 - BIRAD-2  Last Colonoscopy: age 45; 10 years per patient  Last DEXA: 10/11/2021 - t-score -1 1; repeat 15 years    We reviewed ASCCP guidelines for Pap testing  Current Outpatient Medications:     Coenzyme Q10 (COQ10) 100 MG CAPS, Take by mouth, Disp: , Rfl:     hydrocortisone (WESTCORT) 0 2 % cream, Apply topically 2 (two) times a day, Disp: 45 g, Rfl: 3    patient supplied medication,  Formulated Probiotics, Disp: , Rfl:   Social History     Socioeconomic History    Marital status: /Civil Union     Spouse name: Not on file    Number of children: Not on file    Years of education: Not on file    Highest education level: Not on file   Occupational History    Not on file   Tobacco Use    Smoking status: Never Smoker    Smokeless tobacco: Never Used   Vaping Use    Vaping Use: Never used   Substance and Sexual Activity    Alcohol use:  Yes     Alcohol/week: 2 0 standard drinks     Types: 2 Cans of beer per week     Comment: weekends    Drug use: No    Sexual activity: Yes     Partners: Male     Birth control/protection: Post-menopausal     Comment:    Other Topics Concern    Not on file   Social History Narrative    Activities: bicycling    Daily coffee consumption: 2 cp a day    Exercise: walking    Exercises moderately less than 3 times a week     Social Determinants of Health Financial Resource Strain: Not on file   Food Insecurity: Not on file   Transportation Needs: Not on file   Physical Activity: Not on file   Stress: Not on file   Social Connections: Not on file   Intimate Partner Violence: Not on file   Housing Stability: Not on file     Family History   Problem Relation Age of Onset    Hypertension Mother     Throat cancer Mother         laryngeal    Stroke Mother         syndrome    Substance Abuse Mother     Thyroid disease Mother     Lung cancer Mother     Esophageal cancer Father     Stroke Father         syndrome    Hypertension Maternal Grandmother     Thyroid disease Maternal Grandmother     Hypertension Maternal Grandfather     Stroke Maternal Grandfather         syndrome    No Known Problems Daughter     Other Son         racing heart rhythm    Emphysema Paternal Grandmother     Alzheimer's disease Paternal Grandfather     Heart murmur Son     Ovarian cancer Half-Sister     No Known Problems Half-Sister     No Known Problems Half-Brother     No Known Problems Half-Brother     Mental illness Neg Hx      Past Medical History:   Diagnosis Date    Blood type, Rh negative     Ganglion cyst of wrist, right     Overweight         Review of Systems   Respiratory: Negative  Cardiovascular: Negative  Gastrointestinal: Negative for constipation and diarrhea  Genitourinary: Negative for difficulty urinating, pelvic pain, vaginal bleeding, vaginal discharge, itching or odor  O:  Blood pressure 140/76, height 5' 5 3" (1 659 m), weight 79 4 kg (175 lb)  Patient appears well and is not in distress  Neck is supple without masses  Breasts are symmetrical without mass, tenderness, nipple discharge, skin changes or adenopathy  Abdomen is soft and nontender without masses  External genitals are normal without lesions or rashes  Urethral meatus and urethra are normal  Bladder is normal to palpation  Vagina is normal without discharge or bleeding  Cervix is normal without discharge or lesion  Uterus is normal, mobile, nontender without palpable mass  Adnexa are normal, nontender, without palpable mass  A:   Yearly exam      P:   Pap with HPV done - will call with results  Mammo slip given   Colonoscopy due age 63   DEXA due 2036 unless new risk factors develop    RTO one year for yearly exam or sooner as needed

## 2022-08-05 LAB
HPV HR 12 DNA CVX QL NAA+PROBE: NEGATIVE
HPV16 DNA CVX QL NAA+PROBE: NEGATIVE
HPV18 DNA CVX QL NAA+PROBE: NEGATIVE

## 2022-08-09 LAB
LAB AP GYN PRIMARY INTERPRETATION: NORMAL
Lab: NORMAL

## 2022-08-19 ENCOUNTER — RA CDI HCC (OUTPATIENT)
Dept: OTHER | Facility: HOSPITAL | Age: 57
End: 2022-08-19

## 2022-08-19 NOTE — PROGRESS NOTES
[facility]  coding opportunities       Chart reviewed, no opportunity found: CHART REVIEWED, NO OPPORTUNITY FOUND        Patients Insurance        Commercial Insurance: Stefano Machuca

## 2022-09-13 ENCOUNTER — OFFICE VISIT (OUTPATIENT)
Dept: FAMILY MEDICINE CLINIC | Facility: CLINIC | Age: 57
End: 2022-09-13
Payer: COMMERCIAL

## 2022-09-13 VITALS
BODY MASS INDEX: 29.84 KG/M2 | SYSTOLIC BLOOD PRESSURE: 144 MMHG | RESPIRATION RATE: 15 BRPM | OXYGEN SATURATION: 98 % | HEART RATE: 74 BPM | DIASTOLIC BLOOD PRESSURE: 80 MMHG | HEIGHT: 65 IN | WEIGHT: 179.1 LBS

## 2022-09-13 DIAGNOSIS — Z23 NEED FOR VACCINATION: ICD-10-CM

## 2022-09-13 DIAGNOSIS — Z00.00 ROUTINE GENERAL MEDICAL EXAMINATION AT A HEALTH CARE FACILITY: ICD-10-CM

## 2022-09-13 DIAGNOSIS — Z13.6 ENCOUNTER FOR SCREENING FOR CORONARY ARTERY DISEASE: Primary | ICD-10-CM

## 2022-09-13 PROCEDURE — 90746 HEPB VACCINE 3 DOSE ADULT IM: CPT

## 2022-09-13 PROCEDURE — 90471 IMMUNIZATION ADMIN: CPT

## 2022-09-13 PROCEDURE — 90682 RIV4 VACC RECOMBINANT DNA IM: CPT

## 2022-09-13 PROCEDURE — 3725F SCREEN DEPRESSION PERFORMED: CPT | Performed by: FAMILY MEDICINE

## 2022-09-13 PROCEDURE — 90472 IMMUNIZATION ADMIN EACH ADD: CPT

## 2022-09-13 PROCEDURE — 99396 PREV VISIT EST AGE 40-64: CPT | Performed by: FAMILY MEDICINE

## 2022-09-13 NOTE — PROGRESS NOTES
SUBJECTIVE:-------------------------------------------------------------------------------------------    Naeem Khalil is a 61 y o   female and is here for routine health maintenance  Health Maintenance   Topic Date Due    BMI: Followup Plan  Never done    COVID-19 Vaccine (3 - Booster for Moderna series) 09/22/2021    Influenza Vaccine (1) 09/01/2022    Breast Cancer Screening: Mammogram  11/15/2022    HIV Screening  09/13/2024 (Originally 12/18/1980)    Hepatitis C Screening  10/13/2024 (Originally 1965)    DTaP,Tdap,and Td Vaccines (2 - Td or Tdap) 05/20/2023    BMI: Adult  08/04/2023    Annual Physical  08/04/2023    Depression Screening  09/13/2023    Colorectal Cancer Screening  07/16/2025    Cervical Cancer Screening  08/04/2027    Osteoporosis Screening  Completed    Pneumococcal Vaccine: Pediatrics (0 to 5 Years) and At-Risk Patients (6 to 59 Years)  Aged Out    HIB Vaccine  Aged Out    Hepatitis B Vaccine  Aged Out    IPV Vaccine  Aged Out    Hepatitis A Vaccine  Aged Out    Meningococcal ACWY Vaccine  Aged Out    HPV Vaccine  Aged [employer] History   Administered Date(s) Administered    COVID-19 MODERNA VACC 0 5 ML IM 03/25/2021, 04/22/2021    Influenza, injectable, quadrivalent, preservative free 0 5 mL 10/11/2018, 10/14/2019    Influenza, recombinant, quadrivalent,injectable, preservative free 10/02/2020, 10/01/2021    Tdap 05/20/2013    Zoster Vaccine Recombinant 08/24/2021, 10/25/2021         Diet and Physical Activity  Diet: low carbohydrate diet  Body mass index is 29 8 kg/m²    Exercise: frequently      General Health  Hearing:  Is normal  Vision: sees ophthalmologist/optometrist yearly  Dental:  Sees dentist every 6 months      Smoker no        ASSESSMENT/PLAN:-------------------------------------------------------------------------------------------    Patient's physical is up-to-date   Immunizations; hepatitis-B series to start today because patient works in [employer]  Flu shot today  Cancer screenings;  Mammograms scheduled for November 2022  Colonoscopy 2025      Patient instructed on exercise  Patient instructed on healthy choices for diet       NEXT PHYSICAL 1 YEAR  Patient will try to lose a few more lb to get her blood pressure down to normal  She has a way to check it so she will check it with the goal being 130/80    Recheck in 1 year or sooner if needed        The following portions of the patient's history were reviewed and updated as appropriate: allergies, current medications, past family history, past medical history, past social history, past surgical history and problem list       OBJECTIVE:---------------------------------------------------------------------------------------------------    /80   Pulse 74   Resp 15   Ht 5' 5" (1 651 m)   Wt 81 2 kg (179 lb 1 6 oz)   LMP  (LMP Unknown)   SpO2 98%   BMI 29 80 kg/m²   Wt Readings from Last 3 Encounters:   09/13/22 81 2 kg (179 lb 1 6 oz)   08/04/22 79 4 kg (175 lb)   04/29/22 83 6 kg (184 lb 6 4 oz)     BP Readings from Last 3 Encounters:   09/13/22 144/80   08/04/22 140/76   04/29/22 122/78     Pulse Readings from Last 3 Encounters:   09/13/22 74   04/29/22 60   08/24/21 74     Body mass index is 29 8 kg/m²    Health Maintenance   Topic Date Due    BMI: Followup Plan  Never done    COVID-19 Vaccine (3 - Booster for Moderna series) 09/22/2021    Influenza Vaccine (1) 09/01/2022    Breast Cancer Screening: Mammogram  11/15/2022    HIV Screening  09/13/2024 (Originally 12/18/1979)    Hepatitis C Screening  10/13/2024 (Originally 1965)    DTaP,Tdap,and Td Vaccines (2 - Td or Tdap) 05/20/2023    BMI: Adult  08/04/2023    Annual Physical  08/04/2023    Depression Screening  09/13/2023    Colorectal Cancer Screening  07/16/2025    Cervical Cancer Screening  08/04/2027    Osteoporosis Screening  Completed    Pneumococcal Vaccine: Pediatrics (0 to 5 Years) and At-Risk Patients (6 to 59 Years)  Aged Out    HIB Vaccine  Aged Out    Hepatitis B Vaccine  Aged Out    IPV Vaccine  Aged Out    Hepatitis A Vaccine  Aged Out    Meningococcal ACWY Vaccine  Aged Out    HPV Vaccine  Aged Out       ROS:   12 point review of systems negative            PHYSICAL EXAM:    Gen  No acute distress well-appearing well-nourished appears stated age    Mental status  Good judgment and insight oriented to time person and place, recent and remote memory intact mood and affect normal cooperative and patient is reasonable    HEENT  PERRLA 3 mm, EOMI without nystagmus, TMs clear, turbinates open pink no exudate, pharynx benign, tongue midline    Neck   supple no masses trachea midline positive click normal carotid upstrokes with no bruits    Cor  Regular rhythm without ectopy or murmur, no S3-S4, normal palpation that is no heave lift or thrill    Vascular  No edema, good pedal pulses    Lungs  CTA bilaterally in no respiratory distress no wheezes rhonchi or rales, normal to palpation no tactile fremitus    Abdomen  Soft, no palpable masses, no hepatosplenomegaly, normal bowel sounds, nontender    Lymphatics  No palpable nodes in the neck, supraclavicular area, axilla, or groin     Musculoskeletal  No clubbing cyanosis or edema muscle tone normal 12 point review of systems is negative    Skin  no rashes or abnormal appearing lesions    Neuro  Normal ambulation, cranial nerves 2-12 grossly intact, higher functioning with reasoning intact

## 2022-09-13 NOTE — PATIENT INSTRUCTIONS
1  Come back in 2 months for your 2nd hepatitis-B  2  Come back in 6 months from today for your 3rd hepatitis-B    3  When you get your labs is ends December to send them like you did    4  Keep going with  low sugar diet that should help her blood pressure your weight and her blood sugar results    5   Mammogram you already have scheduled for November 22nd  Your colonoscopy is not due until 2025    See you back in 1 year or sooner if needed

## 2022-09-30 ENCOUNTER — OFFICE VISIT (OUTPATIENT)
Dept: FAMILY MEDICINE CLINIC | Facility: CLINIC | Age: 57
End: 2022-09-30
Payer: COMMERCIAL

## 2022-09-30 VITALS
OXYGEN SATURATION: 97 % | SYSTOLIC BLOOD PRESSURE: 130 MMHG | WEIGHT: 176.8 LBS | DIASTOLIC BLOOD PRESSURE: 78 MMHG | HEIGHT: 65 IN | TEMPERATURE: 98.7 F | HEART RATE: 80 BPM | BODY MASS INDEX: 29.46 KG/M2 | RESPIRATION RATE: 18 BRPM

## 2022-09-30 DIAGNOSIS — J02.9 SORE THROAT: ICD-10-CM

## 2022-09-30 DIAGNOSIS — J02.0 STREP PHARYNGITIS: Primary | ICD-10-CM

## 2022-09-30 LAB — S PYO AG THROAT QL: POSITIVE

## 2022-09-30 PROCEDURE — 99214 OFFICE O/P EST MOD 30 MIN: CPT | Performed by: NURSE PRACTITIONER

## 2022-09-30 PROCEDURE — 87880 STREP A ASSAY W/OPTIC: CPT | Performed by: NURSE PRACTITIONER

## 2022-09-30 RX ORDER — PENICILLIN V POTASSIUM 500 MG/1
500 TABLET ORAL 2 TIMES DAILY
Qty: 20 TABLET | Refills: 0 | Status: SHIPPED | OUTPATIENT
Start: 2022-09-30 | End: 2022-10-10

## 2022-09-30 NOTE — PROGRESS NOTES
Assessment/Plan:     Diagnoses and all orders for this visit:    Strep pharyngitis  -     penicillin V potassium (VEETID) 500 mg tablet; Take 1 tablet (500 mg total) by mouth 2 (two) times a day for 10 days    PenVK ordered  Medication and s/e reviewed  Pt encouraged to start warm salt water gargles and Chloraseptic spray PRN  She may also use Tylenol PRN  We discuss contagious period and for her to swap out her toothbrush  She is to rest and keep well hydrated  Patient is encouraged to call our office for any questions/concerns, persistent or worsening symptoms  Patient states they understand and agree with treatment plan  Sore throat  -     POCT rapid strepA          Pt to f/u PRN  Subjective:      Patient ID: Monet Perez is a 61 y o  female  Pt presents today for a sore throat that started yesterday  She denies fever, chills, body aches, NVD, cough sinus pressure  She notes headaches on and off 2 days ago  She did take covid test at home which was negative  The following portions of the patient's history were reviewed and updated as appropriate: allergies, current medications, past family history, past medical history, past social history, past surgical history and problem list     Review of Systems    As noted per HPI  Objective:      /78   Pulse 80   Temp 98 7 °F (37 1 °C) (Oral)   Resp 18   Ht 5' 5" (1 651 m)   Wt 80 2 kg (176 lb 12 8 oz)   LMP  (LMP Unknown)   SpO2 97%   BMI 29 42 kg/m²          Physical Exam  Vitals reviewed  Constitutional:       General: She is not in acute distress  Appearance: Normal appearance  She is well-developed  She is not ill-appearing  HENT:      Right Ear: Tympanic membrane and ear canal normal       Left Ear: Tympanic membrane and ear canal normal       Nose: No congestion or rhinorrhea  Mouth/Throat:      Pharynx: Posterior oropharyngeal erythema present     Cardiovascular:      Rate and Rhythm: Normal rate and regular rhythm  Heart sounds: Normal heart sounds  No murmur heard  Pulmonary:      Effort: Pulmonary effort is normal  No respiratory distress  Breath sounds: Normal breath sounds  No wheezing  Lymphadenopathy:      Head:      Right side of head: Tonsillar adenopathy present  Left side of head: Tonsillar adenopathy present  Skin:     Capillary Refill: Capillary refill takes less than 2 seconds  Neurological:      General: No focal deficit present  Mental Status: She is alert and oriented to person, place, and time  Psychiatric:         Mood and Affect: Mood normal          Behavior: Behavior normal          Thought Content:  Thought content normal          Judgment: Judgment normal

## 2022-11-14 ENCOUNTER — CLINICAL SUPPORT (OUTPATIENT)
Dept: FAMILY MEDICINE CLINIC | Facility: CLINIC | Age: 57
End: 2022-11-14

## 2022-11-14 DIAGNOSIS — Z23 NEED FOR VACCINATION: Primary | ICD-10-CM

## 2022-11-17 ENCOUNTER — HOSPITAL ENCOUNTER (OUTPATIENT)
Dept: MAMMOGRAPHY | Facility: HOSPITAL | Age: 57
Discharge: HOME | End: 2022-11-17
Attending: OBSTETRICS & GYNECOLOGY

## 2022-11-17 VITALS — BODY MASS INDEX: 29.32 KG/M2 | WEIGHT: 176 LBS | HEIGHT: 65 IN

## 2022-11-17 DIAGNOSIS — Z12.31 ENCOUNTER FOR SCREENING MAMMOGRAM FOR MALIGNANT NEOPLASM OF BREAST: ICD-10-CM

## 2023-01-23 ENCOUNTER — OFFICE VISIT (OUTPATIENT)
Dept: OBGYN CLINIC | Facility: CLINIC | Age: 58
End: 2023-01-23

## 2023-01-23 VITALS
SYSTOLIC BLOOD PRESSURE: 140 MMHG | WEIGHT: 175.6 LBS | HEIGHT: 65 IN | BODY MASS INDEX: 29.26 KG/M2 | DIASTOLIC BLOOD PRESSURE: 80 MMHG

## 2023-01-23 DIAGNOSIS — N95.0 POST-MENOPAUSAL BLEEDING: Primary | ICD-10-CM

## 2023-01-23 DIAGNOSIS — E03.8 TSH DEFICIENCY: ICD-10-CM

## 2023-01-23 NOTE — PATIENT INSTRUCTIONS
Call for any fever, prolonged or severe pain or bleeding  Use OTC medication as needed for mild to moderate pain  Avoid vaginal intercourse for 48 hours or until the bleeding has completely stopped  Pelvic US ordered  Will hold on this until we have biopsy results  Call endocrinologist to see if she can complete her previously ordered thyroid studies early  Return to office for annual exam as planned

## 2023-01-23 NOTE — PROGRESS NOTES
Assessment/Plan:  Call for any fever, prolonged or severe pain or bleeding  Use OTC medication as needed for mild to moderate pain  Avoid vaginal intercourse for 48 hours or until the bleeding has completely stopped  Pelvic US ordered  Will hold on this until we have biopsy results  Call endocrinologist to see if she can complete her previously ordered thyroid studies early  Return to office for annual exam as planned  1  Post-menopausal bleeding  -     Pathology Report  -     US pelvis complete w transvaginal; Future    2  TSH deficiency               Subjective:      Patient ID: Niya Wong is a 61 y o  female  HPI    Niya Wong is a 61 y o  [id number] female who is here today for a problem visit   LMP at age 45  She had vaginal bleeding once many years ago  Remembers having an US and lab work, including thyroid level  Diagnosed with tsh deficiency at that time  Bleeding was self limiting  C/o vaginal spotting 2 days over the last 2 weeks  The blood was noted once with wiping and the second occurrence it was noted on her underwear  No recent coitus  Denies pelvic pain, abnormal vaginal discharge  Does admit to slight external vaginal itching the day of the bleeding which has also since resolved  Normal TSH and FT4    4/7/22  Niya Wong is sexually active with male partner of 40 years  Denies pain, bleeding or dryness  She is monogamous  She is not interested in STD screening today  She denies vaginal discharge, itching, pelvic pain  The following portions of the patient's history were reviewed and updated as appropriate: allergies, current medications, past family history, past medical history, past social history, past surgical history and problem list     Review of Systems   Constitutional: Negative  Respiratory: Negative for chest tightness and shortness of breath  Cardiovascular: Negative for chest pain, palpitations and leg swelling     Gastrointestinal: Negative for abdominal pain, blood in stool, constipation, diarrhea, nausea, rectal pain and vomiting  Genitourinary: Positive for vaginal bleeding  Negative for difficulty urinating, dyspareunia, dysuria, flank pain, frequency, pelvic pain, urgency, vaginal discharge and vaginal pain  Musculoskeletal: Negative for back pain  Skin: Negative  Neurological: Negative for weakness, light-headedness and headaches  Psychiatric/Behavioral: Negative  All other systems reviewed and are negative  Objective:      /80 (BP Location: Left arm, Patient Position: Sitting, Cuff Size: Standard)   Ht 5' 4 75" (1 645 m)   Wt 79 7 kg (175 lb 9 6 oz)   LMP  (LMP Unknown)   BMI 29 45 kg/m²          Physical Exam  Vitals and nursing note reviewed  Constitutional:       Appearance: Normal appearance  She is well-developed  Genitourinary:     General: Normal vulva  Exam position: Lithotomy position  Labia:         Right: No rash, tenderness, lesion or injury  Left: No rash, tenderness, lesion or injury  Urethra: No prolapse, urethral pain, urethral swelling or urethral lesion  Vagina: Normal       Cervix: Normal       Uterus: Normal        Adnexa: Right adnexa normal and left adnexa normal         Right: No mass, tenderness or fullness  Left: No mass, tenderness or fullness  Rectum: No external hemorrhoid  Musculoskeletal:         General: Normal range of motion  Lymphadenopathy:      Lower Body: No right inguinal adenopathy  No left inguinal adenopathy  Skin:     General: Skin is warm and dry  Neurological:      Mental Status: She is alert and oriented to person, place, and time  Psychiatric:         Mood and Affect: Mood normal          Behavior: Behavior normal              Endometrial biopsy    Date/Time: 1/23/2023 7:39 AM  Performed by: HEYDI Colvin  Authorized by:  HEYDI Colvin   Universal Protocol:  Consent: Verbal consent obtained  Written consent obtained    Risks and benefits: risks, benefits and alternatives were discussed  Consent given by: patient  Timeout called at: 1/23/2023 7:39 AM   Patient understanding: patient states understanding of the procedure being performed  Patient consent: the patient's understanding of the procedure matches consent given  Procedure consent: procedure consent matches procedure scheduled  Relevant documents: relevant documents present and verified  Patient identity confirmed: verbally with patient      Indication:     Indications: Post-menopausal bleeding      Chronicity of post-menopausal bleeding:  New    Progression of post-menopausal bleeding:  Resolved  Procedure:     Procedure: endometrial biopsy with Pipelle      A bivalve speculum was placed in the vagina: yes      Cervix cleaned and prepped: yes      The cervix was dilated: no      Uterus sounded: yes      Uterus sound depth (cm):  8    Curettes used:  2    Specimen collected: specimen collected and sent to pathology      Patient tolerated procedure well with no complications: yes    Findings:     Uterus size:  9-10 weeks    Cervix: normal      Adnexa: normal      Adnexa comment:  Non palpable, non tender

## 2023-01-27 LAB
PATH REPORT.SITE OF ORIGIN SPEC: NORMAL
PAYMENT PROCEDURE: NORMAL
SL AMB .: NORMAL

## 2023-01-31 LAB
T4 FREE SERPL-MCNC: 1 NG/DL (ref 0.8–1.8)
TSH SERPL-ACNC: 1.13 MIU/L (ref 0.4–4.5)

## 2023-02-01 ENCOUNTER — TELEPHONE (OUTPATIENT)
Dept: OBGYN CLINIC | Facility: CLINIC | Age: 58
End: 2023-02-01

## 2023-02-01 NOTE — TELEPHONE ENCOUNTER
----- Message from Martha Rodriguez Signs sent at 1/31/2023  9:41 PM EST -----  Regarding: TSH and T4  Contact: [phone]  Attached are my TSH and T4 results

## 2023-02-07 ENCOUNTER — HOSPITAL ENCOUNTER (OUTPATIENT)
Dept: RADIOLOGY | Facility: HOSPITAL | Age: 58
Discharge: HOME | End: 2023-02-07

## 2023-02-07 DIAGNOSIS — Z13.6 ENCOUNTER FOR SCREENING FOR CORONARY ARTERY DISEASE: ICD-10-CM

## 2023-02-07 DIAGNOSIS — N95.0 POST-MENOPAUSAL BLEEDING: ICD-10-CM

## 2023-02-11 ENCOUNTER — HOSPITAL ENCOUNTER (EMERGENCY)
Facility: HOSPITAL | Age: 58
Discharge: HOME | End: 2023-02-11
Attending: EMERGENCY MEDICINE

## 2023-02-11 VITALS
OXYGEN SATURATION: 98 % | TEMPERATURE: 98.1 F | SYSTOLIC BLOOD PRESSURE: 184 MMHG | DIASTOLIC BLOOD PRESSURE: 90 MMHG | RESPIRATION RATE: 16 BRPM | HEART RATE: 78 BPM

## 2023-02-11 DIAGNOSIS — H53.19 PHOTOPSIA OF LEFT EYE: ICD-10-CM

## 2023-02-11 DIAGNOSIS — H43.399 FLOATERS IN VISUAL FIELD: Primary | ICD-10-CM

## 2023-02-11 RX ORDER — TETRACAINE HYDROCHLORIDE 5 MG/ML
2 SOLUTION OPHTHALMIC ONCE
Status: COMPLETED | OUTPATIENT
Start: 2023-02-11 | End: 2023-02-11

## 2023-02-11 RX ADMIN — FLUORESCEIN SODIUM 1 STRIP: 1 STRIP OPHTHALMIC at 11:43

## 2023-02-11 RX ADMIN — TETRACAINE HYDROCHLORIDE 2 DROP: 5 SOLUTION OPHTHALMIC at 11:44

## 2023-02-11 NOTE — ED PROVIDER NOTES
History  Chief Complaint   Patient presents with   • Eye Problem     Pt states she normally has occasional floaters in her vision but saw an increase in floaters yesterday, pt states seeing occasional flashing light in L eye since yesterday afternoon, denies blurriness      50-year-old female is presenting with a complaint of floaters and flashers in her left eye  Patient states that at baseline she does have myopia in the left eye, does generally wear contacts or corrective lenses  Today she is wearing her corrective lenses  Yesterday afternoon that she was seated and working on the computer she began noticing an increased amount of what she describes as floaters in the left eye  Throughout her work in the afternoon she began having sensation of flashing lights intermittently throughout her visual field in the left eye as well  She denies any pain in the eye itself, does note that she has some chronic irritation around the eye that she attributes to psoriasis, denies any trauma to her head or eye  She denies any loss of visual acuity, but does state that these occurrences of flushing light have been occurring more frequently today  No prior surgeries to the eye, no prior incidence of glaucoma, retinal detachment or vitreous pathology that she is aware of  Prior to Admission Medications   Prescriptions Last Dose Informant Patient Reported? Taking?    Coenzyme Q10 (COQ10) 100 MG CAPS   Yes No   Sig: Take by mouth   hydrocortisone (WESTCORT) 0 2 % cream   No No   Sig: Apply topically 2 (two) times a day   patient supplied medication   Yes No   Sig:  Formulated Probiotics      Facility-Administered Medications: None       Past Medical History:   Diagnosis Date   • Blood type, Rh negative    • Ganglion cyst of wrist, right    • Overweight        Past Surgical History:   Procedure Laterality Date   •  SECTION, LOW TRANSVERSE     • CHROMOSOME ANALYSIS, PRODUCTS OF CONCEPTION (HISTORICAL) surgical treatment of missed    • COLONOSCOPY     • INDUCED       by dilation and evacuation   • WISDOM TOOTH EXTRACTION         Family History   Problem Relation Age of Onset   • Hypertension Mother    • Throat cancer Mother         laryngeal   • Stroke Mother         syndrome   • Substance Abuse Mother    • Thyroid disease Mother    • Lung cancer Mother    • Esophageal cancer Father    • Stroke Father         syndrome   • No Known Problems Daughter    • Hypertension Maternal Grandmother    • Thyroid disease Maternal Grandmother    • Throat cancer Maternal Grandfather    • Hypertension Maternal Grandfather    • Stroke Maternal Grandfather         syndrome   • Emphysema Paternal Grandmother    • Alzheimer's disease Paternal Grandfather    • Other Son         racing heart rhythm   • Heart murmur Son    • Ovarian cancer Half-Sister    • No Known Problems Half-Sister    • No Known Problems Half-Brother    • No Known Problems Half-Brother    • Mental illness Neg Hx      I have reviewed and agree with the history as documented  E-Cigarette/Vaping   • E-Cigarette Use Never User      E-Cigarette/Vaping Substances   • Nicotine No    • THC No    • CBD No    • Flavoring No    • Other No    • Unknown No      Social History     Tobacco Use   • Smoking status: Never   • Smokeless tobacco: Never   Vaping Use   • Vaping Use: Never used   Substance Use Topics   • Alcohol use: Yes     Alcohol/week: 2 0 standard drinks     Types: 2 Cans of beer per week     Comment: weekends   • Drug use: No        Review of Systems   Constitutional: Negative for chills and fever  HENT: Negative for ear pain and sore throat  Eyes: Positive for redness  Negative for photophobia, pain, itching and visual disturbance  Respiratory: Negative for cough and shortness of breath  Cardiovascular: Negative for chest pain and palpitations  Gastrointestinal: Negative for abdominal pain and vomiting     Genitourinary: Negative for dysuria and hematuria  Musculoskeletal: Negative for arthralgias and back pain  Skin: Negative for color change and rash  Neurological: Negative for seizures and syncope  All other systems reviewed and are negative  Physical Exam  ED Triage Vitals [02/11/23 1025]   Temperature Pulse Respirations Blood Pressure SpO2   98 1 °F (36 7 °C) 78 16 (!) 184/90 98 %      Temp Source Heart Rate Source Patient Position - Orthostatic VS BP Location FiO2 (%)   Oral Monitor Sitting Right arm --      Pain Score       --             Orthostatic Vital Signs  Vitals:    02/11/23 1025   BP: (!) 184/90   Pulse: 78   Patient Position - Orthostatic VS: Sitting       Physical Exam  Vitals and nursing note reviewed  Constitutional:       General: She is not in acute distress  Appearance: She is well-developed and normal weight  She is not toxic-appearing  HENT:      Head: Normocephalic and atraumatic  Right Ear: External ear normal       Left Ear: External ear normal       Nose: Nose normal  No congestion or rhinorrhea  Mouth/Throat:      Mouth: Mucous membranes are moist       Pharynx: Oropharynx is clear  No oropharyngeal exudate or posterior oropharyngeal erythema  Eyes:      General: Lids are normal  Lids are everted, no foreign bodies appreciated  Vision grossly intact  No allergic shiner or scleral icterus  Left eye: No foreign body or discharge  Intraocular pressure: Right eye pressure is 11 mmHg  Left eye pressure is 14 mmHg  Measurements were taken using an automated tonometer  Extraocular Movements: Extraocular movements intact  Right eye: Normal extraocular motion  Left eye: Normal extraocular motion and no nystagmus  Conjunctiva/sclera: Conjunctivae normal       Left eye: No chemosis, exudate or hemorrhage  Pupils: Pupils are equal, round, and reactive to light        Comments: Eye Exam:     POS (Ledy-orbital structures): nothing noted around eye / near orbit LLL (lids, lashes, lacrimals): no lesions, no blepharitis (crusty base of eyelashes), dacryocystitis (tearing/swelling of medial edge of lower lid near drain), hordeolum (a pustule at lash line), chalazion (oil gland at tarsal plate)     C/S (conjunctiva, sclera): white and quiet; no conjunctivitis  No subconjunctival hemorrhage     K (Cornea): no fluorescein uptake, no herpetic dendritic staining pattern noted     AC (Anterior chamber): deep and quiet     I (Iris): round and reactive     L (Lens): Clear     VA (visual acuity: L20/20 R20/20 //with glasses     EOM intact, PERRLA     Retina: sharp disc margins  Ocular U/S: no obvious detachment, no washing machine sign  L eye did demonstrate a hyperechoic region in the posterior eye near the takeoff of there optic nerve, does not look like clear vitreous or retinal pathology, unclear etiology  Cardiovascular:      Rate and Rhythm: Normal rate and regular rhythm  Pulses: Normal pulses  Heart sounds: Normal heart sounds  No murmur heard  Pulmonary:      Effort: Pulmonary effort is normal  No respiratory distress  Breath sounds: Normal breath sounds  No wheezing or rhonchi  Abdominal:      General: Abdomen is flat  There is no distension  Palpations: Abdomen is soft  Tenderness: There is no abdominal tenderness  There is no guarding  Musculoskeletal:         General: No swelling  Cervical back: Neck supple  No rigidity  Right lower leg: No edema  Left lower leg: No edema  Lymphadenopathy:      Cervical: No cervical adenopathy  Skin:     General: Skin is warm and dry  Capillary Refill: Capillary refill takes less than 2 seconds  Coloration: Skin is not jaundiced  Findings: No rash  Neurological:      General: No focal deficit present  Mental Status: She is alert and oriented to person, place, and time  Mental status is at baseline     Psychiatric:         Mood and Affect: Mood normal  Behavior: Behavior normal          ED Medications  Medications   fluorescein sodium sterile ophthalmic strip 1 strip (1 strip Left Eye Given 2/11/23 1143)   tetracaine 0 5 % ophthalmic solution 2 drop (2 drops Left Eye Given 2/11/23 1149)       Diagnostic Studies  Results Reviewed     None                 No orders to display         Procedures  POC Ocular US    Date/Time: 2/11/2023 11:50 AM  Performed by: Jc Peralta MD  Authorized by: Jc Peralta MD     Patient location:  ED  Performed by: Attending  Other Assisting Provider: No    Procedure details:     Exam Type:  Diagnostic    Indications: visual change and floaters      Assessment for: retinal detachment, vitreous hemorrhage and lens dislocation      Eye(s) assessed:  Left eye    Structures visualized: anterior chamber, posterior chamber, lens and optic nerve      Image quality: limited diagnostic      Image availability:  Images available in PACS  Left eye findings:     Foreign body: not identified      Retinal contour: normal      Lens: normal      Vitreous body: anechoic      Left optic nerve sheath diameter: normal (less than or equal to 5 mm)      Left optic nerve sheath diameter (mm):  4  Interpretation:     Ocular US impressions: normal            ED Course                                       Medical Decision Making  54-year-old female presenting with complaint of new onset of flashes and floaters in her left visual field  Patient now says that she is having increase in the flashers in the left eye  Denies any loss of visual acuity or other changes in vision  No eye pain, no discharge, no recent infections or other systemic symptoms  Evaluating further with POCUS of eye, slit-lamp exam with fluorescein and tetracaine  We we will test acuity  Reassessment/disposition: Patient had normal slit-lamp exam, calm and quiet anterior chamber    POCUS was within normal limits though there was a small hyperechoic area of question near the retina at the optic nerve insertion site which was very nonspecific and does not appear to be indicative of any vitreous or retinal pathology  Unable to replicate patient's symptoms  At this time ophthalmologist on-call (Dr Cristina Puckett) was contacted via [facility] text to would like to see the patient in his office tomorrow  No further work-up indicated at this time  Floaters in visual field: acute illness or injury  Photopsia of left eye: acute illness or injury  Risk  Prescription drug management  Disposition  Final diagnoses:   Floaters in visual field   Photopsia of left eye     Time reflects when diagnosis was documented in both MDM as applicable and the Disposition within this note     Time User Action Codes Description Comment    2/11/2023 12:25 PM Raymon Kay [[id number]] Floaters in visual field     2/11/2023 12:26 PM Kanu Mijares [[id number]] Photopsia of left eye       ED Disposition     ED Disposition   Discharge    Condition   Stable    Date/Time   Sat Feb 11, 2023 12:24 PM    [facility] discharge to home/self care                 Follow-up Information     Follow up With Specialties Details Why Contact Info Additional [address] [facility]   Salv[address] Cannon [address]  [id number] [address] Emergency Department Emergency Medicine Go to  If you have sudden loss of vision, a curtain closing around your eye, or other concerning change to your vision please come immediately back to the emergency department [address] Emergency Department, [address] [place] [address], [address]          Discharge Medication List as of 2/11/2023 12:28 PM      CONTINUE these medications which have NOT CHANGED    Details   Coenzyme Q10 (COQ10) 100 MG CAPS Take by mouth, Historical Med      hydrocortisone (WESTCORT) 0 2 % cream Apply topically 2 (two) times a day, Starting Tue 6/29/2021, Normal      patient supplied medication Dr Díaz Probiotics, Starting Fri 5/1/2020, Historical Med               PDMP Review     None           ED Provider  Attending physically available and evaluated Beba Pac  I managed the patient along with the ED Attending      Electronically Signed by         Rona Castillo MD  02/15/23 6980

## 2023-02-11 NOTE — ED ATTENDING ATTESTATION
2/11/2023  IKaren MD, saw and evaluated the patient  I have discussed the patient with the resident/non-physician practitioner and agree with the resident's/non-physician practitioner's findings, Plan of Care, and MDM as documented in the resident's/non-physician practitioner's note, except where noted  All available labs and Radiology studies were reviewed  I was present for key portions of any procedure(s) performed by the resident/non-physician practitioner and I was immediately available to provide assistance  At this point I agree with the current assessment done in the Emergency Department  I have conducted an independent evaluation of this patient a history and physical is as follows:    ED Course         Critical Care Time  Procedures    60 yo female with left eye floaters that are worsening since yesterday  No trauma to eye  Pt with thin eye and told she may be at risk for retinal detachment  No eye pain, no redness  Floaters are intermittent  No change in vision  Vss, afebrile, lungs cta, rrr, eomi, perrl, left eye with no conjunctival erythema, no drainage, no swelling, visual acuity 20/20 with glasses both eyes  Check pressure, u/s to rule out detachment

## 2023-02-11 NOTE — DISCHARGE INSTRUCTIONS
Please follow-up with ophthalmology early on Monday  I did refer you to them however please give their office a call on Monday morning to make sure you get an appointment

## 2023-03-14 ENCOUNTER — CLINICAL SUPPORT (OUTPATIENT)
Dept: FAMILY MEDICINE CLINIC | Facility: CLINIC | Age: 58
End: 2023-03-14

## 2023-03-14 DIAGNOSIS — Z23 NEED FOR VACCINATION: Primary | ICD-10-CM

## 2023-04-04 ENCOUNTER — HOSPITAL ENCOUNTER (OUTPATIENT)
Dept: RADIOLOGY | Facility: HOSPITAL | Age: 58
Discharge: HOME | End: 2023-04-04

## 2023-04-04 DIAGNOSIS — E04.2 MULTIPLE THYROID NODULES: ICD-10-CM

## 2023-05-01 ENCOUNTER — OFFICE VISIT (OUTPATIENT)
Dept: ENDOCRINOLOGY | Facility: CLINIC | Age: 58
End: 2023-05-01

## 2023-05-01 VITALS
WEIGHT: 178.8 LBS | HEIGHT: 65 IN | SYSTOLIC BLOOD PRESSURE: 160 MMHG | BODY MASS INDEX: 29.79 KG/M2 | DIASTOLIC BLOOD PRESSURE: 80 MMHG | HEART RATE: 74 BPM

## 2023-05-01 DIAGNOSIS — E04.2 MULTIPLE THYROID NODULES: Primary | ICD-10-CM

## 2023-05-01 NOTE — PROGRESS NOTES
5/1/2023    Assessment/Plan      Diagnoses and all orders for this visit:    Multiple thyroid nodules  -     TSH, 3rd generation; Future  -     T4, free; Future  -     US thyroid; Future        Assessment/Plan:  Thyroid nodules: Recent thyroid blood work is normal   Thyroid ultrasound shows stable thyroid nodules  We will plan to monitor annually for now and repeat TSH, free T4, thyroid ultrasound prior to next appointment should be in 1 year  Discussed that at that time if we continue to see stability, we will consider spacing out intervals between thyroid ultrasound evaluations  CC: Follow-up    History of Present Illness     HPI: Nancie Solano is a 61y o  year old female with  history of thyroid nodules who presents for a follow-up appointment  She does have a family history of hyperthyroidism in her grandmother and hypothyroidism in her mother  No known family history of thyroid cancer  There is no personal history of head or neck radiation the patient  No neck compressive symptoms  Presents today overall feeling well  No concerns regarding dysphagia, odynophagia, hoarseness  Overall feeling well  She presents today overall feeling well  She had recent endometrial biopsy and ultrasound through GYN and reports negative findings  Review of Systems   Constitutional: Negative for fatigue  HENT: Negative for trouble swallowing and voice change  Eyes: Negative for visual disturbance  Respiratory: Negative for shortness of breath  Cardiovascular: Negative for palpitations and leg swelling  Gastrointestinal: Negative for abdominal pain, nausea and vomiting  Endocrine: Negative for polydipsia and polyuria  Musculoskeletal: Negative for arthralgias and myalgias  Skin: Negative for rash  Neurological: Negative for dizziness, tremors and weakness  Hematological: Negative for adenopathy  Psychiatric/Behavioral: Negative for agitation and confusion         Historical Information Past Medical History:   Diagnosis Date    Blood type, Rh negative     Ganglion cyst of wrist, right     Overweight      Past Surgical History:   Procedure Laterality Date     SECTION, LOW TRANSVERSE      CHROMOSOME ANALYSIS, PRODUCTS OF CONCEPTION (HISTORICAL)      surgical treatment of missed     COLONOSCOPY      INDUCED       by dilation and evacuation    WISDOM TOOTH EXTRACTION       Social History   Social History     Substance and Sexual Activity   Alcohol Use Yes    Alcohol/week: 2 0 standard drinks    Types: 2 Cans of beer per week    Comment: weekends     Social History     Substance and Sexual Activity   Drug Use No     Social History     Tobacco Use   Smoking Status Never   Smokeless Tobacco Never     Family History:   Family History   Problem Relation Age of Onset    Hypertension Mother     Throat cancer Mother         laryngeal    Stroke Mother         syndrome    Substance Abuse Mother     Thyroid disease Mother     Lung cancer Mother     Esophageal cancer Father     Stroke Father         syndrome    No Known Problems Daughter     Hypertension Maternal Grandmother     Thyroid disease Maternal Grandmother     Throat cancer Maternal Grandfather     Hypertension Maternal Grandfather     Stroke Maternal Grandfather         syndrome    Emphysema Paternal Grandmother     Alzheimer's disease Paternal Grandfather     Other Son         racing heart rhythm    Heart murmur Son     Ovarian cancer Half-Sister     No Known Problems Half-Sister     No Known Problems Half-Brother     No Known Problems Half-Brother     Mental illness Neg Hx        Meds/Allergies   Current Outpatient Medications   Medication Sig Dispense Refill    Coenzyme Q10 (COQ10) 100 MG CAPS Take by mouth      hydrocortisone (WESTCORT) 0 2 % cream Apply topically 2 (two) times a day 45 g 3    patient supplied medication  Formulated Probiotics       No current facility-administered "medications for this visit  No Known Allergies    Objective   Vitals: Blood pressure 160/80, pulse 74, height 5' 4 75\" (1 645 m), weight 81 1 kg (178 lb 12 8 oz)  Invasive Devices     None                 Physical Exam  Vitals reviewed  Constitutional:       General: She is not in acute distress  Appearance: She is well-developed  She is not diaphoretic  HENT:      Head: Normocephalic and atraumatic  Eyes:      Conjunctiva/sclera: Conjunctivae normal       Pupils: Pupils are equal, round, and reactive to light  Neck:      Thyroid: No thyromegaly  Cardiovascular:      Rate and Rhythm: Normal rate and regular rhythm  Pulmonary:      Effort: Pulmonary effort is normal  No respiratory distress  Breath sounds: Normal breath sounds  Abdominal:      General: Bowel sounds are normal       Palpations: Abdomen is soft  Musculoskeletal:         General: Normal range of motion  Cervical back: Normal range of motion and neck supple  Skin:     General: Skin is warm and dry  Findings: No rash  Neurological:      Mental Status: She is alert and oriented to person, place, and time  Motor: No abnormal muscle tone  Psychiatric:         Behavior: Behavior normal          The history was obtained from the review of the chart and from the patient  Lab Results:      Component      Latest Ref Rng & Units 1/31/2023   Free T4      0 8 - 1 8 ng/dL 1 0   TSH, POC      0 40 - 4 50 mIU/L 1 13     4/4/23:  THYROID ULTRASOUND     INDICATION:    E04 2: Nontoxic multinodular goiter      COMPARISON:  Thyroid ultrasound 4/7/2022     TECHNIQUE:   Ultrasound of the thyroid was performed with a high frequency linear transducer in transverse and sagittal planes including volumetric imaging sweeps as well as traditional still imaging technique      FINDINGS:  Normal homogeneous smooth echotexture      Right lobe: 4 3 x 1 0 x 1 5 cm  Volume 3 2 mL  Left lobe:  4 4 x 1 1 x 1 3 cm   Volume 2 9 mL  Isthmus: " "0 2  cm      Nodule #1  Image 13  RIGHT midgland nodule measuring 0 5 x 0 6 x 0 5 cm  Given differences in measuring technique, no significant change from prior  COMPOSITION:  2 points, solid or almost completely solid   ECHOGENICITY:  2 points, hypoechoic  SHAPE:  0 points, wider-than-tall  MARGIN: 0 points, smooth  ECHOGENIC FOCI:  0 points, none or large comet-tail artifacts  TI-RADS Classification: TR 4 (4-6 points),  FNA if > 1 5 cm  Follow if > 1cm      Nodule #2  Image 23  Right isthmus nodule measuring 0 9 x 0 5 x 0 7 cm  Given differences in measuring technique, no significant change from prior  COMPOSITION:  2 points, solid or almost completely solid   ECHOGENICITY:  2 points, hypoechoic  SHAPE:  0 points, wider-than-tall  MARGIN: 0 points, smooth  ECHOGENIC FOCI:  0 points, none or large comet-tail artifacts  TI-RADS Classification: TR 4 (4-6 points), FNA if > 1 5 cm  Follow if > 1cm         IMPRESSION:     No nodule meets current ACR criteria for requiring biopsy or followup ultrasounds             Reference: ACR Thyroid Imaging, Reporting and Data System (TI-RADS): White Paper of the [employer]  J AM Raj Radiol 8036;[id number]  (additional recommendations based on American Thyroid Association 2015 guidelines )        Workstation performed: [id number]      Future Appointments   Date Time Provider Jolanta Harmon   8/17/2023  8:40 AM MD BOB [facility]   9/14/2023  7:30 AM [facility]       Portions of the record may have been created with voice recognition software  Occasional wrong word or \"sound a like\" substitutions may have occurred due to the inherent limitations of voice recognition software  Read the chart carefully and recognize, using context, where substitutions have occurred      "

## 2023-05-26 ENCOUNTER — OFFICE VISIT (OUTPATIENT)
Dept: FAMILY MEDICINE CLINIC | Facility: CLINIC | Age: 58
End: 2023-05-26

## 2023-05-26 VITALS
OXYGEN SATURATION: 98 % | BODY MASS INDEX: 28.54 KG/M2 | SYSTOLIC BLOOD PRESSURE: 162 MMHG | DIASTOLIC BLOOD PRESSURE: 100 MMHG | RESPIRATION RATE: 15 BRPM | HEART RATE: 68 BPM | HEIGHT: 66 IN | WEIGHT: 177.6 LBS

## 2023-05-26 DIAGNOSIS — E55.9 VITAMIN D DEFICIENCY: ICD-10-CM

## 2023-05-26 DIAGNOSIS — E78.2 MIXED HYPERLIPIDEMIA: ICD-10-CM

## 2023-05-26 DIAGNOSIS — I10 ESSENTIAL HYPERTENSION: Primary | ICD-10-CM

## 2023-05-26 RX ORDER — LISINOPRIL 10 MG/1
10 TABLET ORAL DAILY
Qty: 30 TABLET | Refills: 1 | Status: SHIPPED | OUTPATIENT
Start: 2023-05-26

## 2023-05-26 NOTE — PATIENT INSTRUCTIONS
Please start the lisinopril and take it either with your evening meal or at bedtime whichever helps you to remember it the best  Then try to keep a consistent    Keep working on your weight and cutting down on obvious sodium    Expect your blood pressures to start dropping after about 2 weeks  Start checking your blood pressures in 3 weeks but only 3 times a week; once in the morning, one time midday, and one time in the evening and record them and bring them in    Have fasting blood work and urine completed sometime before you get back        See you back in 6 weeks or sooner if needed

## 2023-05-26 NOTE — PROGRESS NOTES
Assessment/Plan:    Hypertension  EKG today  Trial of lisinopril side effects discussed  Patient also to continue with lifestyle changes further weight loss and decreasing sodium if possible    Recheck in 6 weeks to check blood pressure screening blood work prior or prior to her yearly appointment in August  Recheck sooner if needed        BMI Counseling: Body mass index is 29 1 kg/m²  The BMI is above normal  Nutrition recommendations include decreasing portion sizes, encouraging healthy choices of fruits and vegetables and decreasing fast food intake  Exercise recommendations include moderate physical activity 150 minutes/week  Rationale for BMI follow-up plan is due to patient being overweight or obese  Depression Screening and Follow-up Plan: Patient was screened for depression during today's encounter  They screened negative with a PHQ-2 score of 0  Subjective:   Dawna Phoenix is a 61 y  o female  Chief Complaint   Patient presents with   • Blood Pressure Check     Patient is here with concern over her blood pressures  When she goes to NA specialist her blood pressure always seems to be elevated usually around 160/80 range    Her mother brother and maternal grandmother all have elevated blood pressure in both her mother and father had strokes    She brought in blood pressure readings from the last couple days and all systolics are greater than 752 diastolic are pretty well controlled in the mid 80s      Past medical history, social history, and family history reviewed as appropriate for the complaint of this patient  MEDICATIONS REVIEWED AND UPDATED    10 point review of systems performed, the remainder of the ROS is negative except for what is noted in the history of chief complaint    Objective:    Vitals:    05/26/23 1000   BP: 162/100   Pulse: 68   Resp: 15   SpO2: 98%     Body mass index is 29 1 kg/m²      Physical Exam    Constitutional  Appears healthy, Looks well, Appearance consistent with age    Mental Status  Alert, Oriented, Cooperative, Memory function normal , clean, and reasonable    Neck  No neck mass, No thyromegaly, Good carotid upstrokes bilaterally, trachea midline positive click    Respiratory  Breath sounds normal, No rales, No rhonchi, No wheezing, normal palpation    Cardiac  Regular rhythm without ectopy or murmur no S3-S4, no heave lift or thrill to palpation    Vascular  No leg edema, No pedal edema    Muscular skeletal  No clubbing cyanosis , muscle tone normal    Skin  No appreciable rashes or abnormal appearing lesions

## 2023-06-29 ENCOUNTER — RA CDI HCC (OUTPATIENT)
Dept: OTHER | Facility: HOSPITAL | Age: 58
End: 2023-06-29

## 2023-06-29 NOTE — PROGRESS NOTES
[facility] 75  coding opportunities       Chart reviewed, no opportunity found: CHART REVIEWED, NO OPPORTUNITY FOUND        Patients Insurance        Commercial Insurance: Stefano Machuca

## 2023-07-10 ENCOUNTER — OFFICE VISIT (OUTPATIENT)
Dept: FAMILY MEDICINE CLINIC | Facility: CLINIC | Age: 58
End: 2023-07-10
Payer: COMMERCIAL

## 2023-07-10 VITALS
SYSTOLIC BLOOD PRESSURE: 132 MMHG | OXYGEN SATURATION: 99 % | HEART RATE: 64 BPM | BODY MASS INDEX: 28.48 KG/M2 | RESPIRATION RATE: 16 BRPM | TEMPERATURE: 98.1 F | DIASTOLIC BLOOD PRESSURE: 82 MMHG | WEIGHT: 177.2 LBS | HEIGHT: 66 IN

## 2023-07-10 DIAGNOSIS — I10 ESSENTIAL HYPERTENSION: ICD-10-CM

## 2023-07-10 PROCEDURE — 99213 OFFICE O/P EST LOW 20 MIN: CPT | Performed by: FAMILY MEDICINE

## 2023-07-10 RX ORDER — LISINOPRIL 10 MG/1
10 TABLET ORAL DAILY
Qty: 90 TABLET | Refills: 1 | Status: SHIPPED | OUTPATIENT
Start: 2023-07-10

## 2023-07-10 NOTE — PROGRESS NOTES
Assessment/Plan:    Hypertension  Great change in blood pressures and just 6 weeks from 162/100 to 132/82! Continue lisinopril and recheck as already scheduled in September for physical with blood work prior          Depression Screening and Follow-up Plan: Patient was screened for depression during today's encounter. They screened negative with a PHQ-2 score of 0. Subjective:   Penny Maria is a 61 y. o.female  Chief Complaint   Patient presents with   • Follow-up     BP check     Patient is here for blood pressure recheck after starting lisinopril  She has no complaints of the medicine  She also is watching her diet and exercising      Past medical history, social history, and family history reviewed as appropriate for the complaint of this patient. MEDICATIONS REVIEWED AND UPDATED    10 point review of systems performed, the remainder of the ROS is negative except for what is noted in the history of chief complaint    Objective:    Vitals:    07/10/23 0815   BP: 132/82   Pulse: 64   Resp: 16   Temp: 98.1 °F (36.7 °C)   SpO2: 99%     Body mass index is 28.6 kg/m².     Physical Exam    Constitutional  Appears healthy, Looks well, Appearance consistent with age    Mental Status  Alert, Oriented, Cooperative, Memory function normal , clean, and reasonable    Neck  No neck mass, No thyromegaly, Good carotid upstrokes bilaterally, trachea midline positive click    Respiratory  Breath sounds normal, No rales, No rhonchi, No wheezing, normal palpation    Cardiac  Regular rhythm without ectopy or murmur no S3-S4, no heave lift or thrill to palpation    Vascular  No leg edema, No pedal edema    Muscular skeletal  No clubbing cyanosis , muscle tone normal    Skin  No appreciable rashes or abnormal appearing lesions

## 2023-07-10 NOTE — PATIENT INSTRUCTIONS
Keep up the great work  See you back in September remember fasting blood work and urine one or 2 weeks prior    Any new problems we are available

## 2023-08-25 LAB
25(OH)D3 SERPL-MCNC: 23 NG/ML (ref 30–100)
ALBUMIN SERPL-MCNC: 4.1 G/DL (ref 3.6–5.1)
ALBUMIN/GLOB SERPL: 1.6 (CALC) (ref 1–2.5)
ALP SERPL-CCNC: 48 U/L (ref 37–153)
ALT SERPL-CCNC: 17 U/L (ref 6–29)
APPEARANCE UR: CLEAR
AST SERPL-CCNC: 16 U/L (ref 10–35)
BACTERIA UR QL AUTO: NORMAL /HPF
BASOPHILS # BLD AUTO: 62 CELLS/UL (ref 0–200)
BASOPHILS NFR BLD AUTO: 1.2 %
BILIRUB SERPL-MCNC: 0.4 MG/DL (ref 0.2–1.2)
BILIRUB UR QL STRIP: NEGATIVE
BUN SERPL-MCNC: 15 MG/DL (ref 7–25)
BUN/CREAT SERPL: ABNORMAL (CALC) (ref 6–22)
CALCIUM SERPL-MCNC: 9.2 MG/DL (ref 8.6–10.4)
CHLORIDE SERPL-SCNC: 106 MMOL/L (ref 98–110)
CHOLEST SERPL-MCNC: 185 MG/DL
CHOLEST/HDLC SERPL: 3.3 (CALC)
CO2 SERPL-SCNC: 30 MMOL/L (ref 20–32)
COLOR UR: YELLOW
CREAT SERPL-MCNC: 0.95 MG/DL (ref 0.5–1.03)
EOSINOPHIL # BLD AUTO: 567 CELLS/UL (ref 15–500)
EOSINOPHIL NFR BLD AUTO: 10.9 %
ERYTHROCYTE [DISTWIDTH] IN BLOOD BY AUTOMATED COUNT: 11.8 % (ref 11–15)
GFR/BSA.PRED SERPLBLD CYS-BASED-ARV: 69 ML/MIN/1.73M2
GLOBULIN SER CALC-MCNC: 2.6 G/DL (CALC) (ref 1.9–3.7)
GLUCOSE SERPL-MCNC: 103 MG/DL (ref 65–99)
GLUCOSE UR QL STRIP: NEGATIVE
HCT VFR BLD AUTO: 38.9 % (ref 35–45)
HDLC SERPL-MCNC: 56 MG/DL
HGB BLD-MCNC: 13.1 G/DL (ref 11.7–15.5)
HGB UR QL STRIP: NEGATIVE
HYALINE CASTS #/AREA URNS LPF: NORMAL /LPF
KETONES UR QL STRIP: NEGATIVE
LDLC SERPL CALC-MCNC: 112 MG/DL (CALC)
LEUKOCYTE ESTERASE UR QL STRIP: NEGATIVE
LYMPHOCYTES # BLD AUTO: 2028 CELLS/UL (ref 850–3900)
LYMPHOCYTES NFR BLD AUTO: 39 %
MCH RBC QN AUTO: 32 PG (ref 27–33)
MCHC RBC AUTO-ENTMCNC: 33.7 G/DL (ref 32–36)
MCV RBC AUTO: 95.1 FL (ref 80–100)
MONOCYTES # BLD AUTO: 312 CELLS/UL (ref 200–950)
MONOCYTES NFR BLD AUTO: 6 %
NEUTROPHILS # BLD AUTO: 2231 CELLS/UL (ref 1500–7800)
NEUTROPHILS NFR BLD AUTO: 42.9 %
NITRITE UR QL STRIP: NEGATIVE
NONHDLC SERPL-MCNC: 129 MG/DL (CALC)
PH UR STRIP: 7.5 [PH] (ref 5–8)
PLATELET # BLD AUTO: 244 THOUSAND/UL (ref 140–400)
PMV BLD REES-ECKER: 11 FL (ref 7.5–12.5)
POTASSIUM SERPL-SCNC: 4.6 MMOL/L (ref 3.5–5.3)
PROT SERPL-MCNC: 6.7 G/DL (ref 6.1–8.1)
PROT UR QL STRIP: NEGATIVE
RBC # BLD AUTO: 4.09 MILLION/UL (ref 3.8–5.1)
RBC #/AREA URNS HPF: NORMAL /HPF
SODIUM SERPL-SCNC: 141 MMOL/L (ref 135–146)
SP GR UR STRIP: 1.02 (ref 1–1.03)
SQUAMOUS #/AREA URNS HPF: NORMAL /HPF
TRIGL SERPL-MCNC: 80 MG/DL
WBC # BLD AUTO: 5.2 THOUSAND/UL (ref 3.8–10.8)
WBC #/AREA URNS HPF: NORMAL /HPF

## 2023-09-14 ENCOUNTER — ANNUAL EXAM (OUTPATIENT)
Dept: OBGYN CLINIC | Facility: CLINIC | Age: 58
End: 2023-09-14
Payer: COMMERCIAL

## 2023-09-14 ENCOUNTER — OFFICE VISIT (OUTPATIENT)
Dept: FAMILY MEDICINE CLINIC | Facility: CLINIC | Age: 58
End: 2023-09-14
Payer: COMMERCIAL

## 2023-09-14 VITALS
DIASTOLIC BLOOD PRESSURE: 76 MMHG | WEIGHT: 174 LBS | HEIGHT: 66 IN | BODY MASS INDEX: 27.97 KG/M2 | SYSTOLIC BLOOD PRESSURE: 118 MMHG

## 2023-09-14 VITALS
HEIGHT: 66 IN | OXYGEN SATURATION: 98 % | HEART RATE: 64 BPM | TEMPERATURE: 98.4 F | DIASTOLIC BLOOD PRESSURE: 80 MMHG | SYSTOLIC BLOOD PRESSURE: 140 MMHG | BODY MASS INDEX: 27.97 KG/M2 | WEIGHT: 174 LBS

## 2023-09-14 DIAGNOSIS — R73.9 ELEVATED BLOOD SUGAR: ICD-10-CM

## 2023-09-14 DIAGNOSIS — E04.2 MULTIPLE THYROID NODULES: ICD-10-CM

## 2023-09-14 DIAGNOSIS — Z12.31 ENCOUNTER FOR SCREENING MAMMOGRAM FOR MALIGNANT NEOPLASM OF BREAST: ICD-10-CM

## 2023-09-14 DIAGNOSIS — I10 ESSENTIAL HYPERTENSION: ICD-10-CM

## 2023-09-14 DIAGNOSIS — Z00.00 ROUTINE GENERAL MEDICAL EXAMINATION AT A HEALTH CARE FACILITY: Primary | ICD-10-CM

## 2023-09-14 DIAGNOSIS — E55.9 VITAMIN D DEFICIENCY: ICD-10-CM

## 2023-09-14 DIAGNOSIS — Z01.419 ENCOUNTER FOR GYNECOLOGICAL EXAMINATION (GENERAL) (ROUTINE) WITHOUT ABNORMAL FINDINGS: Primary | ICD-10-CM

## 2023-09-14 PROBLEM — S82.891A CLOSED AVULSION FRACTURE OF RIGHT ANKLE: Status: RESOLVED | Noted: 2021-07-22 | Resolved: 2023-09-14

## 2023-09-14 PROBLEM — R79.89 LOW TSH LEVEL: Status: RESOLVED | Noted: 2018-10-01 | Resolved: 2023-09-14

## 2023-09-14 PROBLEM — E03.8 TSH DEFICIENCY: Status: RESOLVED | Noted: 2018-04-06 | Resolved: 2023-09-14

## 2023-09-14 PROCEDURE — 99396 PREV VISIT EST AGE 40-64: CPT | Performed by: OBSTETRICS & GYNECOLOGY

## 2023-09-14 PROCEDURE — 99214 OFFICE O/P EST MOD 30 MIN: CPT | Performed by: FAMILY MEDICINE

## 2023-09-14 PROCEDURE — 99396 PREV VISIT EST AGE 40-64: CPT | Performed by: FAMILY MEDICINE

## 2023-09-14 RX ORDER — CHOLECALCIFEROL (VITAMIN D3) 125 MCG
TABLET ORAL
Refills: 0
Start: 2023-09-14

## 2023-09-14 NOTE — PROGRESS NOTES
Jannine Duane   1965    CC:  Yearly exam    S:  61 y.o. female here for yearly exam. She is postmenopausal and has had no vaginal bleeding since her evaluation in January of this year. She denies vaginal discharge, itching, odor or dryness. Sexual activity: She is sexually active without pain, bleeding or dryness. Last Pap: 8/4/2022 - normal/negative HPV  Last Mammo: 11/17/2022 - BIRAD-1  Last Colonoscopy: 7/16/2015 - 10 years  Last DEXA: 10/11/2021 - osteopenia with t-score -1.1; repeat 8-10 years    We reviewed ASCCP guidelines for Pap testing. Current Outpatient Medications:   •  Coenzyme Q10 (COQ10) 100 MG CAPS, Take by mouth, Disp: , Rfl:   •  Ergocalciferol (Vitamin D2) 50 MCG (2000 UT) TABS, 1 daily, Disp: , Rfl: 0  •  hydrocortisone (WESTCORT) 0.2 % cream, Apply topically 2 (two) times a day, Disp: 45 g, Rfl: 3  •  lisinopril (ZESTRIL) 10 mg tablet, Take 1 tablet (10 mg total) by mouth daily, Disp: 90 tablet, Rfl: 1  •  patient supplied medication, Dr Díaz Probiotics, Disp: , Rfl:   Social History     Socioeconomic History   • Marital status: /Civil Union     Spouse name: Not on file   • Number of children: Not on file   • Years of education: Not on file   • Highest education level: Not on file   Occupational History   • Not on file   Tobacco Use   • Smoking status: Never   • Smokeless tobacco: Never   Vaping Use   • Vaping Use: Never used   Substance and Sexual Activity   • Alcohol use:  Yes     Alcohol/week: 2.0 standard drinks of alcohol     Types: 2 Cans of beer per week     Comment: weekends   • Drug use: No   • Sexual activity: Yes     Partners: Male     Birth control/protection: Post-menopausal     Comment:    Other Topics Concern   • Not on file   Social History Narrative    Activities: bicycling    Daily coffee consumption: 2 cp a day    Exercise: walking    Exercises moderately less than 3 times a week     Social Determinants of Health     Financial Resource Strain: Not on file   Food Insecurity: Not on file   Transportation Needs: Not on file   Physical Activity: Not on file   Stress: Not on file   Social Connections: Not on file   Intimate Partner Violence: Not on file   Housing Stability: Not on file     Family History   Problem Relation Age of Onset   • Hypertension Mother    • Throat cancer Mother         laryngeal   • Stroke Mother         syndrome   • Substance Abuse Mother    • Thyroid disease Mother         Hashimoto’s   • Lung cancer Mother    • Cancer Mother         Lung, throat   • Esophageal cancer Father    • Stroke Father         syndrome   • Cancer Father         Esophageal   • No Known Problems Daughter    • Hypertension Maternal Grandmother    • Thyroid disease Maternal Grandmother         Hyperthyroidism   • Throat cancer Maternal Grandfather    • Hypertension Maternal Grandfather    • Stroke Maternal Grandfather         syndrome   • Cancer Maternal Grandfather         Throat   • Emphysema Paternal Grandmother    • Alzheimer's disease Paternal Grandfather    • Other Son         racing heart rhythm   • Heart murmur Son    • Ovarian cancer Half-Sister    • No Known Problems Half-Sister    • No Known Problems Half-Brother    • No Known Problems Half-Brother    • Ovarian cancer Sister         Half-sister (same mother)   • Mental illness Neg Hx      Past Medical History:   Diagnosis Date   • Blood type, Rh negative    • Ganglion cyst of wrist, right    • Hypertension 5/26/2023   • Miscarriage 1994 1999   • Overweight    • Varicella 1976        Review of Systems   Respiratory: Negative. Cardiovascular: Negative. Gastrointestinal: Negative for constipation and diarrhea. Genitourinary: Negative for difficulty urinating, pelvic pain, vaginal bleeding, vaginal discharge, itching or odor. O:  Blood pressure 118/76, height 5' 5.5" (1.664 m), weight 78.9 kg (174 lb).     Patient appears well and is not in distress  Neck is supple without masses  Breasts are symmetrical without mass, tenderness, nipple discharge, skin changes or adenopathy. Abdomen is soft and nontender without masses. External genitals are normal without lesions or rashes. Urethral meatus and urethra are normal  Bladder is normal to palpation  Vagina is normal without discharge or bleeding. Cervix is normal without discharge or lesion. Uterus is normal, mobile, nontender without palpable mass. Adnexa are normal, nontender, without palpable mass. A:   Yearly exam.     P:   Pap due 2027  Mammo slip given   Colonoscopy due 2025   DEXA up to date    RTO one year for yearly exam or sooner as needed.

## 2023-09-14 NOTE — PROGRESS NOTES
SUBJECTIVE:-------------------------------------------------------------------------------------------    Nehemias Zapata is a 61 y.o.  female and is here for routine health maintenance. Health Maintenance   Topic Date Due   • DTaP,Tdap,and Td Vaccines (2 - Td or Tdap) 05/20/2023   • Annual Physical  09/13/2023   • Influenza Vaccine (1) 09/01/2023   • Breast Cancer Screening: Mammogram  11/17/2023   • HIV Screening  09/13/2024 (Originally 12/18/1980)   • Hepatitis C Screening  10/13/2024 (Originally 1965)   • BMI: Followup Plan  05/26/2024   • BMI: Adult  07/10/2024   • Depression Screening  09/14/2024   • Colorectal Cancer Screening  07/16/2025   • Cervical Cancer Screening  08/04/2027   • Osteoporosis Screening  Completed   • COVID-19 Vaccine  Completed   • Pneumococcal Vaccine: Pediatrics (0 to 5 Years) and At-Risk Patients (6 to 59 Years)  Aged Out   • HIB Vaccine  Aged Out   • IPV Vaccine  Aged Out   • Hepatitis A Vaccine  Aged Out   • Meningococcal ACWY Vaccine  Aged Out   • HPV Vaccine  Aged [employer] History   Administered Date(s) Administered   • COVID-19 MODERNA VACC 0.5 ML IM 03/25/2021, 04/22/2021   • COVID-19 Moderna Vac BIVALENT 12 Yr+ IM 0.5 ML 10/26/2022   • Hep B, adult 09/13/2022, 11/14/2022, 03/14/2023   • INFLUENZA 09/13/2022   • Influenza, injectable, quadrivalent, preservative free 0.5 mL 10/11/2018, 10/14/2019   • Influenza, recombinant, quadrivalent,injectable, preservative free 10/02/2020, 10/01/2021, 09/13/2022   • Tdap 05/20/2013   • Zoster Vaccine Recombinant 08/24/2021, 10/25/2021         Diet and Physical Activity  Diet: well balanced diet  Body mass index is 28.51 kg/m².   Exercise: frequently      General Health  Hearing:  Is normal  Vision: sees ophthalmologist/optometrist yearly  Dental:  Sees dentist every 6 months      Smoker no        ASSESSMENT/PLAN:-------------------------------------------------------------------------------------------    Patient's physical is up-to-date   Immunizations; will check at work to see when her last tetanus was  Cancer screenings;  GYN exam today  GYN will order mammogram      Patient instructed on exercise  Patient instructed on healthy choices for diet       NEXT PHYSICAL 1 YEAR          The following portions of the patient's history were reviewed and updated as appropriate: allergies, current medications, past family history, past medical history, past social history, past surgical history and problem list.      OBJECTIVE:---------------------------------------------------------------------------------------------------    /80   Pulse 64   Temp 98.4 °F (36.9 °C) (Oral)   Ht 5' 5.5" (1.664 m)   Wt 78.9 kg (174 lb)   LMP  (LMP Unknown)   SpO2 98%   BMI 28.51 kg/m²   Wt Readings from Last 3 Encounters:   09/14/23 78.9 kg (174 lb)   07/10/23 80.4 kg (177 lb 3.2 oz)   05/26/23 80.6 kg (177 lb 9.6 oz)     BP Readings from Last 3 Encounters:   09/14/23 140/80   07/10/23 132/82   05/26/23 162/100     Pulse Readings from Last 3 Encounters:   09/14/23 64   07/10/23 64   05/26/23 68     Body mass index is 28.51 kg/m².   Health Maintenance   Topic Date Due   • DTaP,Tdap,and Td Vaccines (2 - Td or Tdap) 05/20/2023   • Annual Physical  09/13/2023   • Influenza Vaccine (1) 09/01/2023   • Breast Cancer Screening: Mammogram  11/17/2023   • HIV Screening  09/13/2024 (Originally 12/18/1980)   • Hepatitis C Screening  10/13/2024 (Originally 1965)   • BMI: Followup Plan  05/26/2024   • BMI: Adult  07/10/2024   • Depression Screening  09/14/2024   • Colorectal Cancer Screening  07/16/2025   • Cervical Cancer Screening  08/04/2027   • Osteoporosis Screening  Completed   • COVID-19 Vaccine  Completed   • Pneumococcal Vaccine: Pediatrics (0 to 5 Years) and At-Risk Patients (6 to 59 Years)  Aged Out   • HIB Vaccine  Aged Out   • IPV Vaccine  Aged Out   • Hepatitis A Vaccine  Aged Out   • Meningococcal ACWY Vaccine  Aged Out   • HPV Vaccine  Aged Out       ROS:   12 point review of systems negative            PHYSICAL EXAM:    Gen. No acute distress well-appearing well-nourished appears stated age    Mental status  Good judgment and insight oriented to time person and place, recent and remote memory intact mood and affect normal cooperative and patient is reasonable    HEENT  PERRLA 3 mm, EOMI without nystagmus, TMs clear, turbinates open pink no exudate, pharynx benign, tongue midline    Neck   supple no masses trachea midline positive click normal carotid upstrokes with no bruits    Cor  Regular rhythm without ectopy or murmur, no S3-S4, normal palpation that is no heave lift or thrill    Vascular  No edema, good pedal pulses    Lungs  CTA bilaterally in no respiratory distress no wheezes rhonchi or rales, normal to palpation no tactile fremitus    Abdomen  Soft, no palpable masses, no hepatosplenomegaly, normal bowel sounds, nontender    Lymphatics  No palpable nodes in the neck, supraclavicular area, axilla, or groin     Musculoskeletal  No clubbing cyanosis or edema muscle tone normal 12 point review of systems is negative    Skin  no rashes or abnormal appearing lesions    Neuro  Normal ambulation, cranial nerves 2-12 grossly intact, higher functioning with reasoning intact.

## 2023-09-14 NOTE — PATIENT INSTRUCTIONS
Please begin vitamin D 2000 units once a day and you can take it with your lisinopril    Keep up the good work you are doing and check your blood pressures and record them as you have    Please ask at work when your last tetanus shot was and if it was a DTaP or a Tdap    We will see you back in 6 months to recheck your blood pressure  Get nonfasting blood work one or 2 weeks prior

## 2023-11-20 ENCOUNTER — HOSPITAL ENCOUNTER (OUTPATIENT)
Dept: MAMMOGRAPHY | Facility: MEDICAL CENTER | Age: 58
Discharge: HOME | End: 2023-11-20
Payer: COMMERCIAL

## 2023-11-20 VITALS — BODY MASS INDEX: 27.95 KG/M2 | HEIGHT: 66 IN | WEIGHT: 173.94 LBS

## 2023-11-20 DIAGNOSIS — Z12.31 ENCOUNTER FOR SCREENING MAMMOGRAM FOR MALIGNANT NEOPLASM OF BREAST: ICD-10-CM

## 2023-11-20 PROCEDURE — 77067 SCR MAMMO BI INCL CAD: CPT

## 2023-11-20 PROCEDURE — 77063 BREAST TOMOSYNTHESIS BI: CPT

## 2023-11-26 DIAGNOSIS — I10 ESSENTIAL HYPERTENSION: ICD-10-CM

## 2023-11-26 RX ORDER — LISINOPRIL 10 MG/1
10 TABLET ORAL DAILY
Qty: 90 TABLET | Refills: 3 | Status: SHIPPED | OUTPATIENT
Start: 2023-11-26

## 2024-03-01 LAB
25(OH)D3 SERPL-MCNC: 39 NG/ML (ref 30–100)
HBA1C MFR BLD: 5.3 % OF TOTAL HGB

## 2024-03-07 ENCOUNTER — RA CDI HCC (OUTPATIENT)
Dept: OTHER | Facility: HOSPITAL | Age: 59
End: 2024-03-07

## 2024-03-07 NOTE — PROGRESS NOTES
[facility] coding opportunities       Chart reviewed, no opportunity found: CHART REVIEWED, NO OPPORTUNITY FOUND        Patients Insurance        Commercial Insurance: [employer]

## 2024-03-14 ENCOUNTER — OFFICE VISIT (OUTPATIENT)
Dept: FAMILY MEDICINE CLINIC | Facility: CLINIC | Age: 59
End: 2024-03-14
Payer: COMMERCIAL

## 2024-03-14 VITALS
WEIGHT: 178 LBS | RESPIRATION RATE: 15 BRPM | HEIGHT: 66 IN | DIASTOLIC BLOOD PRESSURE: 80 MMHG | BODY MASS INDEX: 28.61 KG/M2 | OXYGEN SATURATION: 98 % | SYSTOLIC BLOOD PRESSURE: 140 MMHG | HEART RATE: 73 BPM

## 2024-03-14 DIAGNOSIS — E78.2 MIXED HYPERLIPIDEMIA: ICD-10-CM

## 2024-03-14 DIAGNOSIS — E04.2 MULTIPLE THYROID NODULES: ICD-10-CM

## 2024-03-14 DIAGNOSIS — E55.9 VITAMIN D DEFICIENCY: ICD-10-CM

## 2024-03-14 DIAGNOSIS — I10 ESSENTIAL HYPERTENSION: Primary | ICD-10-CM

## 2024-03-14 PROCEDURE — 99214 OFFICE O/P EST MOD 30 MIN: CPT | Performed by: FAMILY MEDICINE

## 2024-03-14 RX ORDER — LACTOBACILLUS ACIDOPHILUS / LACTOBACILLUS BULGARICUS 100 MILLION CFU STRENGTH
1 GRANULES ORAL
COMMUNITY

## 2024-03-14 NOTE — PATIENT INSTRUCTIONS
Continue plan Everything looks great including your blood pressures at home    See you back in 6 months with only a urine and a nonfasting vitamin D since your work does everything else you need for blood we need for purposes    See you sooner if needed otherwise

## 2024-03-14 NOTE — PROGRESS NOTES
ASSESSMENT/PLAN:    Hypertension  Remains excellent on lisinopril especially when she is not here  We will check a EKG next visit  We will check blood work next visit    Elevated fasting blood sugar  A1c 5.3 no evidence of diabetes    High LDL  Diet only  Check before next visit    Low vitamin D  Now with vitamin D has come up to 39 from 23  Continue vitamin D 2000 units daily      Thyroid nodules  Ultrasound showed none that were biopsy or the patient soon will probably not need to check ultrasound yearly because they have been quite stable for 5 years  Also follows with endocrine who has ordered a thyroid ultrasound for this April 2024        Recheck in 6 months  Screening blood work no A1c since it was nice and low at 5.3  Recheck sooner if needed       Depression Screening and Follow-up Plan: Patient was screened for depression during today's encounter. They screened negative with a PHQ-2 score of 0.           Health Maintenance   Topic Date Due    COVID-19 Vaccine (5 - 2023-24 season) 09/01/2023    HIV Screening  09/13/2024 (Originally 12/18/1980)    Hepatitis C Screening  10/13/2024 (Originally 1965)    Annual Physical  09/14/2024    Breast Cancer Screening: Mammogram  11/20/2024    Depression Screening  03/14/2025    Colorectal Cancer Screening  07/16/2025    Cervical Cancer Screening  08/04/2027    DTaP,Tdap,and Td Vaccines (3 - Td or Tdap) 01/01/2028    Zoster Vaccine  Completed    Influenza Vaccine  Completed    Pneumococcal Vaccine: Pediatrics (0 to 5 Years) and At-Risk Patients (6 to 64 Years)  Aged Out    HIB Vaccine  Aged Out    IPV Vaccine  Aged Out    Hepatitis A Vaccine  Aged Out    Meningococcal ACWY Vaccine  Aged Out    HPV Vaccine  Aged Out         Problem List as of 3/14/2024 Reviewed: 9/14/2023 11:09 AM by Katie Alvarado MD      Dense breast tissue    Eczema    Ganglion    Multiple thyroid nodules         Subjective:   Chief Complaint   Patient presents with    Blood Pressure Check      Patient is here for blood pressure recheck and blood work checked  Patient brought in her self blood pressure is done at home, and they are excellent with a systolic all usually less than 20 for the most part  With her diastolic in the 70s  Patient feels good without complaints    Saw GYN for an exam no Pap test  GYN note reviewed with patient  Mammogram reviewed with patient done in November        patient ID: Candi Mendoza is a 58 y.o. female.    Patient's past medical history, surgical history, family history, social history, and Tobacco history reviewed with patient.     MED LIST WAS REVIEWED AND UPDATED    ROS  As per HPI  Rest of 12 point review of systems negative     Objective:      VITALS:  Wt Readings from Last 3 Encounters:   03/14/24 80.7 kg (178 lb)   11/20/23 78.9 kg (173 lb 15.1 oz)   09/14/23 78.9 kg (174 lb)     BP Readings from Last 3 Encounters:   03/14/24 140/80   09/14/23 118/76   09/14/23 140/80     Pulse Readings from Last 3 Encounters:   03/14/24 73   09/14/23 64   07/10/23 64     Body mass index is 29.17 kg/m².    Laboratory Results:   All pertinent labs and studies were reviewed with patient during this office visit with highlights of the results contained in this note in the ASSESSMENT AND PLAN section       Physical Exam  Constitutional  Appears healthy, Looks well, Appearance consistent with age    Mental Status  Alert, Oriented, Cooperative, Memory function normal , clean, and reasonable    Neck  No neck mass, No thyromegaly, Good carotid upstrokes bilaterally, trachea midline positive click    Respiratory  Breath sounds normal, No rales, No rhonchi, No wheezing, normal palpation    Cardiac  Regular rhythm without ectopy or murmur no S3-S4, no heave lift or thrill to palpation    Vascular  No leg edema, No pedal edema    Muscular skeletal  No clubbing cyanosis , muscle tone normal    Skin  No appreciable rashes or abnormal appearing lesions

## 2024-04-11 ENCOUNTER — HOSPITAL ENCOUNTER (OUTPATIENT)
Dept: RADIOLOGY | Facility: HOSPITAL | Age: 59
Discharge: HOME | End: 2024-04-11
Payer: COMMERCIAL

## 2024-04-11 DIAGNOSIS — E04.2 MULTIPLE THYROID NODULES: ICD-10-CM

## 2024-04-11 PROCEDURE — 76536 US EXAM OF HEAD AND NECK: CPT

## 2024-04-12 LAB
T4 FREE SERPL-MCNC: 1.1 NG/DL (ref 0.8–1.8)
TSH SERPL-ACNC: 1.34 MIU/L (ref 0.4–4.5)

## 2024-04-22 DIAGNOSIS — E04.2 MULTIPLE THYROID NODULES: Primary | ICD-10-CM

## 2024-04-25 NOTE — NURSING NOTE
Call placed to patient to discuss upcoming appointment at [facility] radiology department and complete consultation with patient. Patient is having a thyroid biopsy utilizing US guidance. Reviewed patient's allergies, no current anticoagulant medication present per patient, also discussed the pre and post procedure expectations. Patient made aware of need for  post procedure if anti anxiety medication is taken, per patient she will not be taking anything. Reminded patient of location and time expected for procedure, Patient expressed understanding by verbalizing and repeating instructions.

## 2024-05-07 ENCOUNTER — HOSPITAL ENCOUNTER (OUTPATIENT)
Dept: RADIOLOGY | Facility: HOSPITAL | Age: 59
Discharge: HOME | End: 2024-05-07
Attending: INTERNAL MEDICINE
Payer: COMMERCIAL

## 2024-05-07 DIAGNOSIS — E04.2 MULTIPLE THYROID NODULES: ICD-10-CM

## 2024-05-07 PROCEDURE — 10005 FNA BX W/US GDN 1ST LES: CPT

## 2024-05-07 PROCEDURE — 88172 CYTP DX EVAL FNA 1ST EA SITE: CPT | Performed by: PATHOLOGY

## 2024-05-07 PROCEDURE — 88173 CYTOPATH EVAL FNA REPORT: CPT | Performed by: PATHOLOGY

## 2024-05-07 RX ORDER — LIDOCAINE HYDROCHLORIDE 10 MG/ML
2 INJECTION, SOLUTION EPIDURAL; INFILTRATION; INTRACAUDAL; PERINEURAL ONCE
Status: COMPLETED | OUTPATIENT
Start: 2024-05-07 | End: 2024-05-07

## 2024-05-07 RX ADMIN — LIDOCAINE HYDROCHLORIDE 2 ML: 10 INJECTION, SOLUTION EPIDURAL; INFILTRATION; INTRACAUDAL; PERINEURAL at 09:35

## 2024-05-10 PROCEDURE — 88173 CYTOPATH EVAL FNA REPORT: CPT | Performed by: PATHOLOGY

## 2024-05-10 PROCEDURE — 88172 CYTP DX EVAL FNA 1ST EA SITE: CPT | Performed by: PATHOLOGY

## 2024-05-23 ENCOUNTER — OFFICE VISIT (OUTPATIENT)
Dept: ENDOCRINOLOGY | Facility: CLINIC | Age: 59
End: 2024-05-23
Payer: COMMERCIAL

## 2024-05-23 VITALS
WEIGHT: 180 LBS | HEART RATE: 71 BPM | HEIGHT: 65 IN | SYSTOLIC BLOOD PRESSURE: 130 MMHG | BODY MASS INDEX: 29.99 KG/M2 | DIASTOLIC BLOOD PRESSURE: 82 MMHG | OXYGEN SATURATION: 99 %

## 2024-05-23 DIAGNOSIS — E04.2 MULTIPLE THYROID NODULES: Primary | ICD-10-CM

## 2024-05-23 PROCEDURE — 99214 OFFICE O/P EST MOD 30 MIN: CPT | Performed by: INTERNAL MEDICINE

## 2024-05-23 NOTE — PROGRESS NOTES
5/23/2024    Assessment & Plan      Diagnoses and all orders for this visit:    Multiple thyroid nodules  -     TSH, 3rd generation; Future  -     T4, free; Future  -     US thyroid; Future        Assessment/Plan:  Thyroid nodules: Recent thyroid blood work is normal.  Recent biopsy initially returned Pelham category 3 and we discussed the risk of thyroid cancer about 5-15% and we are waiting on Afirma testing results.  Will be in touch with patient as results are available.  If this is benign we will plan to arrange follow-up in 1 year with labs just prior and ultrasound just prior.      CC: Follow-up    History of Present Illness     HPI: Candi Mendoza is a 58 y.o. year old female with history of thyroid nodules who presents for a follow-up appointment.  She does have a family history of hyperthyroidism in her grandmother and hypothyroidism in her mother.  There is no known family history of thyroid cancer.  Patient does not have any personal history of head or neck radiation.  She has not had neck compressive symptoms.  And recent ultrasound from April 2024, right midpole nodule was found to have grown and biopsy was recommended.  This was performed on 5/7 with results returning Pelham class III atypia of undetermined significance.  She presents today overall feeling well.  Has noted some larger capsules and tablets having more difficulty going down when swallowing but otherwise no new neck compressive symptoms.    Review of Systems   Constitutional:  Negative for fatigue.   HENT:  Negative for trouble swallowing and voice change.    Eyes:  Negative for visual disturbance.   Respiratory:  Negative for shortness of breath.    Cardiovascular:  Negative for palpitations and leg swelling.   Gastrointestinal:  Negative for abdominal pain, nausea and vomiting.   Endocrine: Negative for polydipsia and polyuria.   Musculoskeletal:  Negative for arthralgias and myalgias.   Skin:  Negative for rash.   Neurological:   Negative for dizziness, tremors and weakness.   Hematological:  Negative for adenopathy.   Psychiatric/Behavioral:  Negative for agitation and confusion.        Historical Information   Past Medical History:   Diagnosis Date    Blood type, Rh negative     Ganglion cyst of wrist, right     Hypertension 2023    Miscarriage 1994    Overweight     Varicella 1976     Past Surgical History:   Procedure Laterality Date     SECTION, LOW TRANSVERSE      CHROMOSOME ANALYSIS, PRODUCTS OF CONCEPTION (HISTORICAL)      surgical treatment of missed     COLONOSCOPY      INDUCED       by dilation and evacuation    US GUIDED THYROID BIOPSY  2024    WISDOM TOOTH EXTRACTION       Social History   Social History     Substance and Sexual Activity   Alcohol Use Yes    Alcohol/week: 2.0 standard drinks of alcohol    Types: 2 Cans of beer per week    Comment: weekends     Social History     Substance and Sexual Activity   Drug Use No     Social History     Tobacco Use   Smoking Status Never   Smokeless Tobacco Never     Family History:   Family History   Problem Relation Age of Onset    Hypertension Mother     Throat cancer Mother         laryngeal    Stroke Mother         syndrome    Substance Abuse Mother     Thyroid disease Mother         Hashimoto’s    Lung cancer Mother 72    Cancer Mother         Lung, throat    Esophageal cancer Father 68    Stroke Father         syndrome    Cancer Father         Esophageal    Ovarian cancer Sister         Half-sister (same mother)    No Known Problems Daughter     Hypertension Maternal Grandmother     Thyroid disease Maternal Grandmother         Hyperthyroidism    Throat cancer Maternal Grandfather 70    Hypertension Maternal Grandfather     Stroke Maternal Grandfather         syndrome    Cancer Maternal Grandfather         Throat    Emphysema Paternal Grandmother     Alzheimer's disease Paternal Grandfather     Other Son         racing heart rhythm    Heart  "murmur Son     Ovarian cancer Half-Sister 38    No Known Problems Half-Sister     No Known Problems Half-Brother     No Known Problems Half-Brother     Mental illness Neg Hx        Meds/Allergies   Current Outpatient Medications   Medication Sig Dispense Refill    Coenzyme Q10 (COQ10) 100 MG CAPS Take by mouth      Ergocalciferol (Vitamin D2) 50 MCG (2000 UT) TABS 1 daily  0    hydrocortisone (WESTCORT) 0.2 % cream Apply topically 2 (two) times a day 45 g 3    lisinopril (ZESTRIL) 10 mg tablet TAKE 1 TABLET BY MOUTH DAILY 90 tablet 3    patient supplied medication  Formulated Probiotics      lactobacillus acidophilus-bulgaricus (FLORANEX) packet Take 1 packet by mouth (Patient not taking: Reported on 5/23/2024)       No current facility-administered medications for this visit.     Allergies   Allergen Reactions    Molds & Smuts Hives    Other Allergic Rhinitis     Seasonal  Dust  Cats  mold       Objective   Vitals: Blood pressure 130/82, pulse 71, height 5' 5\" (1.651 m), weight 81.6 kg (180 lb), SpO2 99%.  Invasive Devices       None                   Physical Exam  Vitals reviewed.   Constitutional:       General: She is not in acute distress.     Appearance: She is well-developed. She is not diaphoretic.   HENT:      Head: Normocephalic and atraumatic.   Eyes:      Conjunctiva/sclera: Conjunctivae normal.      Pupils: Pupils are equal, round, and reactive to light.   Neck:      Thyroid: No thyromegaly.   Cardiovascular:      Rate and Rhythm: Normal rate and regular rhythm.   Pulmonary:      Effort: Pulmonary effort is normal. No respiratory distress.      Breath sounds: Normal breath sounds.   Abdominal:      General: Bowel sounds are normal.      Palpations: Abdomen is soft.   Musculoskeletal:         General: Normal range of motion.      Cervical back: Normal range of motion and neck supple.   Skin:     General: Skin is warm and dry.      Findings: No rash.   Neurological:      Mental Status: She is alert and " "oriented to person, place, and time.      Motor: No abnormal muscle tone.   Psychiatric:         Behavior: Behavior normal.         The history was obtained from the review of the chart and from the patient.    Lab Results:      Component      Latest Ref Rng 4/11/2024   FREE T4      0.8 - 1.8 ng/dL 1.1    TSH, POC      0.40 - 4.50 mIU/L 1.34            Future Appointments   Date Time Provider Department Center   9/17/2024  8:00 AM Hilaria Eden DO [facility]   9/17/2024 10:15 AM Katie Alvarado MD [facility]       Portions of the record may have been created with voice recognition software. Occasional wrong word or \"sound a like\" substitutions may have occurred due to the inherent limitations of voice recognition software. Read the chart carefully and recognize, using context, where substitutions have occurred.    "

## 2024-06-04 ENCOUNTER — DOCUMENTATION (OUTPATIENT)
Dept: HEMATOLOGY ONCOLOGY | Facility: CLINIC | Age: 59
End: 2024-06-04

## 2024-06-04 ENCOUNTER — TELEPHONE (OUTPATIENT)
Dept: ENDOCRINOLOGY | Facility: CLINIC | Age: 59
End: 2024-06-04

## 2024-06-04 ENCOUNTER — TELEPHONE (OUTPATIENT)
Dept: HEMATOLOGY ONCOLOGY | Facility: CLINIC | Age: 59
End: 2024-06-04

## 2024-06-04 DIAGNOSIS — E04.2 MULTIPLE THYROID NODULES: Primary | ICD-10-CM

## 2024-06-04 NOTE — PROGRESS NOTES
Intake received/ Chart reviewed for services completed outside of [facility]    Pathology completed: 5/7/2024    Imaging completed: 4/11/2024    All records needed are in patients chart. No records retrieval needed at this time.

## 2024-06-04 NOTE — TELEPHONE ENCOUNTER
Spoke to patient and reviewed suspicious Afirma result suggesting approximately 50% risk of thyroid cancer.  Referral placed to surgical oncology to consider surgical treatment such as lobectomy.  Patient agreeable to this plan.

## 2024-06-04 NOTE — TELEPHONE ENCOUNTER
Candi called in response to a referral that was received for patient to establish care with Surgical Oncology.     Outreach was made to schedule a consultation.    A consultation was scheduled for patient during this call. Patient is scheduled on 6/27/24  at 1:00 PM with Dr Banerjee at the [facility]

## 2024-06-27 ENCOUNTER — HOSPITAL ENCOUNTER (OUTPATIENT)
Dept: RADIOLOGY | Facility: HOSPITAL | Age: 59
Discharge: HOME | End: 2024-06-27
Payer: COMMERCIAL

## 2024-06-27 ENCOUNTER — APPOINTMENT (OUTPATIENT)
Dept: LAB | Age: 59
End: 2024-06-27
Payer: COMMERCIAL

## 2024-06-27 ENCOUNTER — OFFICE VISIT (OUTPATIENT)
Dept: SURGICAL ONCOLOGY | Facility: CLINIC | Age: 59
End: 2024-06-27
Payer: COMMERCIAL

## 2024-06-27 VITALS
WEIGHT: 183 LBS | TEMPERATURE: 98.4 F | SYSTOLIC BLOOD PRESSURE: 170 MMHG | HEART RATE: 74 BPM | DIASTOLIC BLOOD PRESSURE: 90 MMHG | BODY MASS INDEX: 30.45 KG/M2 | OXYGEN SATURATION: 99 %

## 2024-06-27 DIAGNOSIS — E04.2 MULTIPLE THYROID NODULES: ICD-10-CM

## 2024-06-27 DIAGNOSIS — E04.2 MULTIPLE THYROID NODULES: Primary | ICD-10-CM

## 2024-06-27 LAB
ALBUMIN SERPL BCG-MCNC: 4.2 G/DL (ref 3.5–5)
ALP SERPL-CCNC: 47 U/L (ref 34–104)
ALT SERPL W P-5'-P-CCNC: 13 U/L (ref 7–52)
ANION GAP SERPL CALCULATED.3IONS-SCNC: 10 MMOL/L (ref 4–13)
AST SERPL W P-5'-P-CCNC: 16 U/L (ref 13–39)
ATRIAL RATE: 68 BPM
BASOPHILS # BLD AUTO: 0.06 THOUSANDS/ÂΜL (ref 0–0.1)
BASOPHILS NFR BLD AUTO: 1 % (ref 0–1)
BILIRUB SERPL-MCNC: 0.41 MG/DL (ref 0.2–1)
BUN SERPL-MCNC: 15 MG/DL (ref 5–25)
CALCIUM SERPL-MCNC: 9.2 MG/DL (ref 8.4–10.2)
CHLORIDE SERPL-SCNC: 104 MMOL/L (ref 96–108)
CO2 SERPL-SCNC: 26 MMOL/L (ref 21–32)
CREAT SERPL-MCNC: 0.8 MG/DL (ref 0.6–1.3)
EOSINOPHIL # BLD AUTO: 0.36 THOUSAND/ÂΜL (ref 0–0.61)
EOSINOPHIL NFR BLD AUTO: 5 % (ref 0–6)
ERYTHROCYTE [DISTWIDTH] IN BLOOD BY AUTOMATED COUNT: 11.9 % (ref 11.6–15.1)
GFR SERPL CREATININE-BSD FRML MDRD: 80 ML/MIN/1.73SQ M
GLUCOSE SERPL-MCNC: 87 MG/DL (ref 65–140)
HCT VFR BLD AUTO: 40.8 % (ref 34.8–46.1)
HGB BLD-MCNC: 13.1 G/DL (ref 11.5–15.4)
IMM GRANULOCYTES # BLD AUTO: 0.02 THOUSAND/UL (ref 0–0.2)
IMM GRANULOCYTES NFR BLD AUTO: 0 % (ref 0–2)
LYMPHOCYTES # BLD AUTO: 2.01 THOUSANDS/ÂΜL (ref 0.6–4.47)
LYMPHOCYTES NFR BLD AUTO: 29 % (ref 14–44)
MCH RBC QN AUTO: 31.9 PG (ref 26.8–34.3)
MCHC RBC AUTO-ENTMCNC: 32.1 G/DL (ref 31.4–37.4)
MCV RBC AUTO: 99 FL (ref 82–98)
MONOCYTES # BLD AUTO: 0.38 THOUSAND/ÂΜL (ref 0.17–1.22)
MONOCYTES NFR BLD AUTO: 5 % (ref 4–12)
NEUTROPHILS # BLD AUTO: 4.21 THOUSANDS/ÂΜL (ref 1.85–7.62)
NEUTS SEG NFR BLD AUTO: 60 % (ref 43–75)
NRBC BLD AUTO-RTO: 0 /100 WBCS
P AXIS: 56 DEGREES
PLATELET # BLD AUTO: 241 THOUSANDS/UL (ref 149–390)
PMV BLD AUTO: 11.8 FL (ref 8.9–12.7)
POTASSIUM SERPL-SCNC: 4.4 MMOL/L (ref 3.5–5.3)
PR INTERVAL: 142 MS
PROT SERPL-MCNC: 6.9 G/DL (ref 6.4–8.4)
QRS AXIS: 17 DEGREES
QRSD INTERVAL: 82 MS
QT INTERVAL: 410 MS
QTC INTERVAL: 435 MS
RBC # BLD AUTO: 4.11 MILLION/UL (ref 3.81–5.12)
SODIUM SERPL-SCNC: 140 MMOL/L (ref 135–147)
T WAVE AXIS: 37 DEGREES
VENTRICULAR RATE: 68 BPM
WBC # BLD AUTO: 7.04 THOUSAND/UL (ref 4.31–10.16)

## 2024-06-27 PROCEDURE — 85025 COMPLETE CBC W/AUTO DIFF WBC: CPT

## 2024-06-27 PROCEDURE — 71046 X-RAY EXAM CHEST 2 VIEWS: CPT

## 2024-06-27 PROCEDURE — 80053 COMPREHEN METABOLIC PANEL: CPT

## 2024-06-27 PROCEDURE — 93010 ELECTROCARDIOGRAM REPORT: CPT | Performed by: INTERNAL MEDICINE

## 2024-06-27 PROCEDURE — 93005 ELECTROCARDIOGRAM TRACING: CPT

## 2024-06-27 PROCEDURE — 36415 COLL VENOUS BLD VENIPUNCTURE: CPT

## 2024-06-27 PROCEDURE — 99204 OFFICE O/P NEW MOD 45 MIN: CPT | Performed by: SURGERY

## 2024-06-27 RX ORDER — OXYCODONE HYDROCHLORIDE AND ACETAMINOPHEN 5; 325 MG/1; MG/1
1 TABLET ORAL EVERY 4 HOURS PRN
Qty: 10 TABLET | Refills: 0 | Status: SHIPPED | OUTPATIENT
Start: 2024-06-27

## 2024-06-27 NOTE — H&P (VIEW-ONLY)
Surgical Oncology Consult       [facility] [place]  [address] [facility] [address]  [phone]    Candi PERALTA Reji  1965  [id number]  [facility] [place]  [address] [facility] [address]  [phone]    1. Multiple thyroid nodules  Assessment & Plan:  58-year-old female with right thyroid nodules.  The 1 has increased in size and is suspicious by Afirma.  We discussed that this has a 50% risk of malignancy on excision.  We discussed hemithyroidectomy versus total thyroidectomy.  If this is malignant with a high risk of structural recurrence, and she undergoes hemithyroidectomy, she will need completion thyroidectomy followed by radioactive iodine.  If this is benign and she undergoes hemithyroidectomy, there is a 25 to 30% risk of her needing thyroid hormone in her lifetime.  If this is benign and she undergoes total thyroidectomy, she will need thyroid hormone replacement.  She would like to have hemithyroidectomy.  I think this is completely reasonable since her TSH is normal.  The risks of right thyroid lobectomy were explained and included bleeding, infection, recurrence, need for further surgery, wound complications, hypocalcemia and recurrent laryngeal nerve injury with hoarseness.  Informed consent was obtained.  We will schedule this at our earliest mutual convenience.  We will obtain a neck ultrasound with lymph node mapping prior to surgery.  I will see her again at the time of surgery.  She is agreeable to this plan.  All her questions were answered.  Orders:  -     Ambulatory Referral to Surgical Oncology  -     Case request operating room: RIGHT THYROID LOBECTOMY; Standing  -     Comprehensive metabolic panel; Future  -     CBC and differential; Future  -     EKG 12 lead; Future  -     XR chest pa & lateral; Future; Expected date: 06/27/2024  -      oxyCODONE-acetaminophen (Percocet) 5-325 mg per tablet; Take 1 tablet by mouth every 4 (four) hours as needed for moderate pain Max Daily Amount: 6 tablets  -     US head neck lymph node mapping; Future; Expected date: 06/27/2024  -     Case request operating room: RIGHT THYROID LOBECTOMY      Chief Complaint   Patient presents with    Consult       No follow-ups on file.    Oncology History    No history exists.       History of Present Illness: 58-year-old female who is known about right thyroid nodules for several years.  Thyroid ultrasound from April 11, 2024 reveals the right mid gland nodule had nearly doubled to 1 cm in maximal dimension.  There was an additional subcentimeter nodule on the right.  She then underwent thyroid biopsy on May 7, 2024.  Felegy was [place] 3, atypia of undetermined significance.  This was suspicious by Afirma.  TSH is normal.  She comes in now to discuss further.  She has occasional dysphagia with large pills.  No hoarseness.  No history of radiation to the head or neck.  She does have a family history of thyroid problems with a mother with Hashimoto's, and a maternal grandmother with hyperthyroidism.  There is also a family history of esophagus cancer.    Review of Systems  Complete ROS Surg Onc:   Constitutional: The patient denies new or recent history of general fatigue, no recent weight loss, no change in appetite.   Eyes: No complaints of visual problems, no scleral icterus.   ENT: no complaints of ear pain, no hoarseness, no difficulty swallowing,  no tinnitus and no new masses in head, oral cavity, or neck.   Cardiovascular: No complaints of chest pain, no palpitations, no ankle edema.   Respiratory: No complaints of shortness of breath, no cough.   Gastrointestinal: No complaints of jaundice, no bloody stools, no pale stools.   Genitourinary: No complaints of dysuria, no hematuria, no nocturia, no frequent urination, no urethral discharge.   Musculoskeletal: No  complaints of weakness, paralysis, joint stiffness or arthralgias.  Integumentary: No complaints of rash, no new lesions.   Neurological: No complaints of convulsions, no seizures, no dizziness.   Hematologic/Lymphatic: No complaints of easy bruising.   Endocrine:  No hot or cold intolerance.  No polydipsia, polyphagia, or polyuria.  Allergy/immunology:  No environmental allergies.  No food allergies.  Not immunocompromised.  Skin:  No pallor or rash.  No wound.          Patient Active Problem List   Diagnosis    Eczema    Ganglion    Dense breast tissue    Multiple thyroid nodules    Essential hypertension    Vitamin D deficiency    Mixed hyperlipidemia     Past Medical History:   Diagnosis Date    Blood type, Rh negative     Ganglion cyst of wrist, right     Hypertension 2023    Miscarriage 1994    Overweight     Varicella 1976     Past Surgical History:   Procedure Laterality Date     SECTION, LOW TRANSVERSE      CHROMOSOME ANALYSIS, PRODUCTS OF CONCEPTION (HISTORICAL)      surgical treatment of missed     COLONOSCOPY      INDUCED       by dilation and evacuation    US GUIDED THYROID BIOPSY  2024    WISDOM TOOTH EXTRACTION       Family History   Problem Relation Age of Onset    Hypertension Mother     Throat cancer Mother         laryngeal    Stroke Mother         syndrome    Substance Abuse Mother     Thyroid disease Mother         Hashimoto’s    Lung cancer Mother 72    Cancer Mother         Lung, throat    Esophageal cancer Father 68    Stroke Father         syndrome    Cancer Father         Esophageal    Ovarian cancer Sister         Half-sister (same mother)    No Known Problems Daughter     Hypertension Maternal Grandmother     Thyroid disease Maternal Grandmother         Hyperthyroidism    Throat cancer Maternal Grandfather 70    Hypertension Maternal Grandfather     Stroke Maternal Grandfather         syndrome    Cancer Maternal Grandfather         Throat    Emphysema  Paternal Grandmother     Alzheimer's disease Paternal Grandfather     Other Son         racing heart rhythm    Heart murmur Son     Ovarian cancer Half-Sister 38    No Known Problems Half-Sister     No Known Problems Half-Brother     No Known Problems Half-Brother     Mental illness Neg Hx      Social History     Socioeconomic History    Marital status: /Civil Union     Spouse name: Not on file    Number of children: Not on file    Years of education: Not on file    Highest education level: Not on file   Occupational History    Not on file   Tobacco Use    Smoking status: Never    Smokeless tobacco: Never   Vaping Use    Vaping status: Never Used   Substance and Sexual Activity    Alcohol use: Yes     Alcohol/week: 2.0 standard drinks of alcohol     Types: 2 Cans of beer per week     Comment: weekends    Drug use: No    Sexual activity: Yes     Partners: Male     Birth control/protection: Post-menopausal     Comment:    Other Topics Concern    Not on file   Social History Narrative    Activities: bicycling    Daily coffee consumption: 2 cp a day    Exercise: walking    Exercises moderately less than 3 times a week     Social Determinants of Health     Financial Resource Strain: Not on file   Food Insecurity: Not on file   Transportation Needs: Not on file   Physical Activity: Not on file   Stress: Not on file   Social Connections: Not on file   Intimate Partner Violence: Not on file   Housing Stability: Not on file       Current Outpatient Medications:     Coenzyme Q10 (COQ10) 100 MG CAPS, Take by mouth, Disp: , Rfl:     Ergocalciferol (Vitamin D2) 50 MCG (2000 UT) TABS, 1 daily, Disp: , Rfl: 0    hydrocortisone (WESTCORT) 0.2 % cream, Apply topically 2 (two) times a day, Disp: 45 g, Rfl: 3    lisinopril (ZESTRIL) 10 mg tablet, TAKE 1 TABLET BY MOUTH DAILY, Disp: 90 tablet, Rfl: 3    oxyCODONE-acetaminophen (Percocet) 5-325 mg per tablet, Take 1 tablet by mouth every 4 (four) hours as needed for  moderate pain Max Daily Amount: 6 tablets, Disp: 10 tablet, Rfl: 0    patient supplied medication,  Formulated Probiotics, Disp: , Rfl:     lactobacillus acidophilus-bulgaricus (FLORANEX) packet, Take 1 packet by mouth (Patient not taking: Reported on 5/23/2024), Disp: , Rfl:   Allergies   Allergen Reactions    Molds & Smuts Hives    Other Allergic Rhinitis     Seasonal  Dust  Cats  mold     Vitals:    06/27/24 1249   BP: 170/90   Pulse: 74   Temp: 98.4 °F (36.9 °C)   SpO2: 99%       Physical Exam   Constitutional: General appearance: The Patient is well-developed and well-nourished who appears the stated age in no acute distress. Patient is pleasant and talkative.     HEENT:  Normocephalic.  Sclerae are anicteric. Mucous membranes are moist. Neck is supple without adenopathy. No JVD.  There is a palpable right thyroid nodule.  Chest: The lungs are clear to auscultation.     Cardiac: Heart is regular rate.     Abdomen: Abdomen is soft, non-tender, non-distended and without masses.     Extremities: There is no clubbing or cyanosis. There is no edema.  Symmetric.  Neuro: Grossly nonfocal. Gait is normal.     Lymphatic: No evidence of cervical adenopathy bilaterally.    Skin: Warm, anicteric.    Psych:  Patient is pleasant and talkative.  Breasts:      Pathology:  Final Diagnosis   A-B. Thyroid, Right, mid (ThinPrep and smear preparations):  Atypia of undetermined significance ([place] Category III) - See note.  Atypical cells present.     Satisfactory for evaluation.      Note:  (1) As reported in the [place] System for Reporting Thyroid Cytopathology*, this diagnostic category has demonstrated anywhere from 10-30% risk of malignancy being found in subsequent resections and/or FNA.  This risk of malignancy is expected to change due to the usage of the surgical pathology diagnosis of “non-invasive follicular thyroid neoplasm with papillary-like nuclear features (NIFTP).”  The anticipated risk of malignancy  secondary to NIFTP is 6-18%.    The manual reports that the usual management following this diagnosis is repeat FNA, molecular testing, or lobectomy. Ultimately, clinical/imaging correlation for this patient is needed in arriving at the actual management plan.     *The [place] System for Reporting Thyroid Cytopathology, Vaughn García, Artie Yanes (Eds.), 2018 (2nd ed.)         Electronically signed by Germán Moore MD on 5/10/2024 at  3:08 PM     AFIRMA is suspicious.    Labs:  Lab Results   Component Value Date    TSH 1.34 04/11/2024         Imaging  No results found.  I personally reviewed and interpreted the above laboratory and imaging data.

## 2024-06-27 NOTE — PROGRESS NOTES
Surgical Oncology Consult       [facility] [place]  [address] [facility] [address]  [phone]    Candi PERALTA Reji  1965  [id number]  [facility] [place]  [address] [facility] [address]  [phone]    1. Multiple thyroid nodules  Assessment & Plan:  58-year-old female with right thyroid nodules.  The 1 has increased in size and is suspicious by Afirma.  We discussed that this has a 50% risk of malignancy on excision.  We discussed hemithyroidectomy versus total thyroidectomy.  If this is malignant with a high risk of structural recurrence, and she undergoes hemithyroidectomy, she will need completion thyroidectomy followed by radioactive iodine.  If this is benign and she undergoes hemithyroidectomy, there is a 25 to 30% risk of her needing thyroid hormone in her lifetime.  If this is benign and she undergoes total thyroidectomy, she will need thyroid hormone replacement.  She would like to have hemithyroidectomy.  I think this is completely reasonable since her TSH is normal.  The risks of right thyroid lobectomy were explained and included bleeding, infection, recurrence, need for further surgery, wound complications, hypocalcemia and recurrent laryngeal nerve injury with hoarseness.  Informed consent was obtained.  We will schedule this at our earliest mutual convenience.  We will obtain a neck ultrasound with lymph node mapping prior to surgery.  I will see her again at the time of surgery.  She is agreeable to this plan.  All her questions were answered.  Orders:  -     Ambulatory Referral to Surgical Oncology  -     Case request operating room: RIGHT THYROID LOBECTOMY; Standing  -     Comprehensive metabolic panel; Future  -     CBC and differential; Future  -     EKG 12 lead; Future  -     XR chest pa & lateral; Future; Expected date: 06/27/2024  -      oxyCODONE-acetaminophen (Percocet) 5-325 mg per tablet; Take 1 tablet by mouth every 4 (four) hours as needed for moderate pain Max Daily Amount: 6 tablets  -     US head neck lymph node mapping; Future; Expected date: 06/27/2024  -     Case request operating room: RIGHT THYROID LOBECTOMY      Chief Complaint   Patient presents with    Consult       No follow-ups on file.    Oncology History    No history exists.       History of Present Illness: 58-year-old female who is known about right thyroid nodules for several years.  Thyroid ultrasound from April 11, 2024 reveals the right mid gland nodule had nearly doubled to 1 cm in maximal dimension.  There was an additional subcentimeter nodule on the right.  She then underwent thyroid biopsy on May 7, 2024.  Felegy was [place] 3, atypia of undetermined significance.  This was suspicious by Afirma.  TSH is normal.  She comes in now to discuss further.  She has occasional dysphagia with large pills.  No hoarseness.  No history of radiation to the head or neck.  She does have a family history of thyroid problems with a mother with Hashimoto's, and a maternal grandmother with hyperthyroidism.  There is also a family history of esophagus cancer.    Review of Systems  Complete ROS Surg Onc:   Constitutional: The patient denies new or recent history of general fatigue, no recent weight loss, no change in appetite.   Eyes: No complaints of visual problems, no scleral icterus.   ENT: no complaints of ear pain, no hoarseness, no difficulty swallowing,  no tinnitus and no new masses in head, oral cavity, or neck.   Cardiovascular: No complaints of chest pain, no palpitations, no ankle edema.   Respiratory: No complaints of shortness of breath, no cough.   Gastrointestinal: No complaints of jaundice, no bloody stools, no pale stools.   Genitourinary: No complaints of dysuria, no hematuria, no nocturia, no frequent urination, no urethral discharge.   Musculoskeletal: No  complaints of weakness, paralysis, joint stiffness or arthralgias.  Integumentary: No complaints of rash, no new lesions.   Neurological: No complaints of convulsions, no seizures, no dizziness.   Hematologic/Lymphatic: No complaints of easy bruising.   Endocrine:  No hot or cold intolerance.  No polydipsia, polyphagia, or polyuria.  Allergy/immunology:  No environmental allergies.  No food allergies.  Not immunocompromised.  Skin:  No pallor or rash.  No wound.          Patient Active Problem List   Diagnosis    Eczema    Ganglion    Dense breast tissue    Multiple thyroid nodules    Essential hypertension    Vitamin D deficiency    Mixed hyperlipidemia     Past Medical History:   Diagnosis Date    Blood type, Rh negative     Ganglion cyst of wrist, right     Hypertension 2023    Miscarriage 1994    Overweight     Varicella 1976     Past Surgical History:   Procedure Laterality Date     SECTION, LOW TRANSVERSE      CHROMOSOME ANALYSIS, PRODUCTS OF CONCEPTION (HISTORICAL)      surgical treatment of missed     COLONOSCOPY      INDUCED       by dilation and evacuation    US GUIDED THYROID BIOPSY  2024    WISDOM TOOTH EXTRACTION       Family History   Problem Relation Age of Onset    Hypertension Mother     Throat cancer Mother         laryngeal    Stroke Mother         syndrome    Substance Abuse Mother     Thyroid disease Mother         Hashimoto’s    Lung cancer Mother 72    Cancer Mother         Lung, throat    Esophageal cancer Father 68    Stroke Father         syndrome    Cancer Father         Esophageal    Ovarian cancer Sister         Half-sister (same mother)    No Known Problems Daughter     Hypertension Maternal Grandmother     Thyroid disease Maternal Grandmother         Hyperthyroidism    Throat cancer Maternal Grandfather 70    Hypertension Maternal Grandfather     Stroke Maternal Grandfather         syndrome    Cancer Maternal Grandfather         Throat    Emphysema  Paternal Grandmother     Alzheimer's disease Paternal Grandfather     Other Son         racing heart rhythm    Heart murmur Son     Ovarian cancer Half-Sister 38    No Known Problems Half-Sister     No Known Problems Half-Brother     No Known Problems Half-Brother     Mental illness Neg Hx      Social History     Socioeconomic History    Marital status: /Civil Union     Spouse name: Not on file    Number of children: Not on file    Years of education: Not on file    Highest education level: Not on file   Occupational History    Not on file   Tobacco Use    Smoking status: Never    Smokeless tobacco: Never   Vaping Use    Vaping status: Never Used   Substance and Sexual Activity    Alcohol use: Yes     Alcohol/week: 2.0 standard drinks of alcohol     Types: 2 Cans of beer per week     Comment: weekends    Drug use: No    Sexual activity: Yes     Partners: Male     Birth control/protection: Post-menopausal     Comment:    Other Topics Concern    Not on file   Social History Narrative    Activities: bicycling    Daily coffee consumption: 2 cp a day    Exercise: walking    Exercises moderately less than 3 times a week     Social Determinants of Health     Financial Resource Strain: Not on file   Food Insecurity: Not on file   Transportation Needs: Not on file   Physical Activity: Not on file   Stress: Not on file   Social Connections: Not on file   Intimate Partner Violence: Not on file   Housing Stability: Not on file       Current Outpatient Medications:     Coenzyme Q10 (COQ10) 100 MG CAPS, Take by mouth, Disp: , Rfl:     Ergocalciferol (Vitamin D2) 50 MCG (2000 UT) TABS, 1 daily, Disp: , Rfl: 0    hydrocortisone (WESTCORT) 0.2 % cream, Apply topically 2 (two) times a day, Disp: 45 g, Rfl: 3    lisinopril (ZESTRIL) 10 mg tablet, TAKE 1 TABLET BY MOUTH DAILY, Disp: 90 tablet, Rfl: 3    oxyCODONE-acetaminophen (Percocet) 5-325 mg per tablet, Take 1 tablet by mouth every 4 (four) hours as needed for  moderate pain Max Daily Amount: 6 tablets, Disp: 10 tablet, Rfl: 0    patient supplied medication,  Formulated Probiotics, Disp: , Rfl:     lactobacillus acidophilus-bulgaricus (FLORANEX) packet, Take 1 packet by mouth (Patient not taking: Reported on 5/23/2024), Disp: , Rfl:   Allergies   Allergen Reactions    Molds & Smuts Hives    Other Allergic Rhinitis     Seasonal  Dust  Cats  mold     Vitals:    06/27/24 1249   BP: 170/90   Pulse: 74   Temp: 98.4 °F (36.9 °C)   SpO2: 99%       Physical Exam   Constitutional: General appearance: The Patient is well-developed and well-nourished who appears the stated age in no acute distress. Patient is pleasant and talkative.     HEENT:  Normocephalic.  Sclerae are anicteric. Mucous membranes are moist. Neck is supple without adenopathy. No JVD.  There is a palpable right thyroid nodule.  Chest: The lungs are clear to auscultation.     Cardiac: Heart is regular rate.     Abdomen: Abdomen is soft, non-tender, non-distended and without masses.     Extremities: There is no clubbing or cyanosis. There is no edema.  Symmetric.  Neuro: Grossly nonfocal. Gait is normal.     Lymphatic: No evidence of cervical adenopathy bilaterally.    Skin: Warm, anicteric.    Psych:  Patient is pleasant and talkative.  Breasts:      Pathology:  Final Diagnosis   A-B. Thyroid, Right, mid (ThinPrep and smear preparations):  Atypia of undetermined significance ([place] Category III) - See note.  Atypical cells present.     Satisfactory for evaluation.      Note:  (1) As reported in the [place] System for Reporting Thyroid Cytopathology*, this diagnostic category has demonstrated anywhere from 10-30% risk of malignancy being found in subsequent resections and/or FNA.  This risk of malignancy is expected to change due to the usage of the surgical pathology diagnosis of “non-invasive follicular thyroid neoplasm with papillary-like nuclear features (NIFTP).”  The anticipated risk of malignancy  secondary to NIFTP is 6-18%.    The manual reports that the usual management following this diagnosis is repeat FNA, molecular testing, or lobectomy. Ultimately, clinical/imaging correlation for this patient is needed in arriving at the actual management plan.     *The [place] System for Reporting Thyroid Cytopathology, Vaughn García, Artie Yanes (Eds.), 2018 (2nd ed.)         Electronically signed by Germán Moore MD on 5/10/2024 at  3:08 PM     AFIRMA is suspicious.    Labs:  Lab Results   Component Value Date    TSH 1.34 04/11/2024         Imaging  No results found.  I personally reviewed and interpreted the above laboratory and imaging data.

## 2024-06-27 NOTE — LETTER
June 27, 2024     Hilaria Eden DO  [address]  [address]  [place] [address]    Patient: Candi Mendoza   YOB: 1965   Date of Visit: 6/27/2024       Dear Dr. Eden:    Thank you for referring Candi Mendoza to me for evaluation. Below are my notes for this consultation.    If you have questions, please do not hesitate to call me. I look forward to following your patient along with you.         Sincerely,        Rafael Banerjee MD        CC: DO Rafael Baker MD  6/27/2024  1:27 PM  Sign when Signing Visit               Surgical Oncology Consult       [facility] [place]  [address] [address]  [phone]    Candi Mendoza  1965  [id number]  [facility] [place]  [address] [address]  [phone]    1. Multiple thyroid nodules  Assessment & Plan:  58-year-old female with right thyroid nodules.  The 1 has increased in size and is suspicious by Afirma.  We discussed that this has a 50% risk of malignancy on excision.  We discussed hemithyroidectomy versus total thyroidectomy.  If this is malignant with a high risk of structural recurrence, and she undergoes hemithyroidectomy, she will need completion thyroidectomy followed by radioactive iodine.  If this is benign and she undergoes hemithyroidectomy, there is a 25 to 30% risk of her needing thyroid hormone in her lifetime.  If this is benign and she undergoes total thyroidectomy, she will need thyroid hormone replacement.  She would like to have hemithyroidectomy.  I think this is completely reasonable since her TSH is normal.  The risks of right thyroid lobectomy were explained and included bleeding, infection, recurrence, need for further surgery, wound complications, hypocalcemia and recurrent laryngeal nerve injury with hoarseness.  Informed consent  was obtained.  We will schedule this at our earliest mutual convenience.  We will obtain a neck ultrasound with lymph node mapping prior to surgery.  I will see her again at the time of surgery.  She is agreeable to this plan.  All her questions were answered.  Orders:  -     Ambulatory Referral to Surgical Oncology  -     Case request operating room: RIGHT THYROID LOBECTOMY; Standing  -     Comprehensive metabolic panel; Future  -     CBC and differential; Future  -     EKG 12 lead; Future  -     XR chest pa & lateral; Future; Expected date: 06/27/2024  -     oxyCODONE-acetaminophen (Percocet) 5-325 mg per tablet; Take 1 tablet by mouth every 4 (four) hours as needed for moderate pain Max Daily Amount: 6 tablets  -     US head neck lymph node mapping; Future; Expected date: 06/27/2024  -     Case request operating room: RIGHT THYROID LOBECTOMY      Chief Complaint   Patient presents with   • Consult       No follow-ups on file.    Oncology History    No history exists.       History of Present Illness: 58-year-old female who is known about right thyroid nodules for several years.  Thyroid ultrasound from April 11, 2024 reveals the right mid gland nodule had nearly doubled to 1 cm in maximal dimension.  There was an additional subcentimeter nodule on the right.  She then underwent thyroid biopsy on May 7, 2024.  Felegy was [place] 3, atypia of undetermined significance.  This was suspicious by Afirma.  TSH is normal.  She comes in now to discuss further.  She has occasional dysphagia with large pills.  No hoarseness.  No history of radiation to the head or neck.  She does have a family history of thyroid problems with a mother with Hashimoto's, and a maternal grandmother with hyperthyroidism.  There is also a family history of esophagus cancer.    Review of Systems  Complete ROS Surg Onc:   Constitutional: The patient denies new or recent history of general fatigue, no recent weight loss, no change in appetite.    Eyes: No complaints of visual problems, no scleral icterus.   ENT: no complaints of ear pain, no hoarseness, no difficulty swallowing,  no tinnitus and no new masses in head, oral cavity, or neck.   Cardiovascular: No complaints of chest pain, no palpitations, no ankle edema.   Respiratory: No complaints of shortness of breath, no cough.   Gastrointestinal: No complaints of jaundice, no bloody stools, no pale stools.   Genitourinary: No complaints of dysuria, no hematuria, no nocturia, no frequent urination, no urethral discharge.   Musculoskeletal: No complaints of weakness, paralysis, joint stiffness or arthralgias.  Integumentary: No complaints of rash, no new lesions.   Neurological: No complaints of convulsions, no seizures, no dizziness.   Hematologic/Lymphatic: No complaints of easy bruising.   Endocrine:  No hot or cold intolerance.  No polydipsia, polyphagia, or polyuria.  Allergy/immunology:  No environmental allergies.  No food allergies.  Not immunocompromised.  Skin:  No pallor or rash.  No wound.          Patient Active Problem List   Diagnosis   • Eczema   • Ganglion   • Dense breast tissue   • Multiple thyroid nodules   • Essential hypertension   • Vitamin D deficiency   • Mixed hyperlipidemia     Past Medical History:   Diagnosis Date   • Blood type, Rh negative    • Ganglion cyst of wrist, right    • Hypertension 2023   • Miscarriage 1994   • Overweight    • Varicella      Past Surgical History:   Procedure Laterality Date   •  SECTION, LOW TRANSVERSE     • CHROMOSOME ANALYSIS, PRODUCTS OF CONCEPTION (HISTORICAL)      surgical treatment of missed    • COLONOSCOPY     • INDUCED       by dilation and evacuation   • US GUIDED THYROID BIOPSY  2024   • WISDOM TOOTH EXTRACTION       Family History   Problem Relation Age of Onset   • Hypertension Mother    • Throat cancer Mother         laryngeal   • Stroke Mother         syndrome   • Substance Abuse Mother    •  Thyroid disease Mother         Hashimoto’s   • Lung cancer Mother 72   • Cancer Mother         Lung, throat   • Esophageal cancer Father 68   • Stroke Father         syndrome   • Cancer Father         Esophageal   • Ovarian cancer Sister         Half-sister (same mother)   • No Known Problems Daughter    • Hypertension Maternal Grandmother    • Thyroid disease Maternal Grandmother         Hyperthyroidism   • Throat cancer Maternal Grandfather 70   • Hypertension Maternal Grandfather    • Stroke Maternal Grandfather         syndrome   • Cancer Maternal Grandfather         Throat   • Emphysema Paternal Grandmother    • Alzheimer's disease Paternal Grandfather    • Other Son         racing heart rhythm   • Heart murmur Son    • Ovarian cancer Half-Sister 38   • No Known Problems Half-Sister    • No Known Problems Half-Brother    • No Known Problems Half-Brother    • Mental illness Neg Hx      Social History     Socioeconomic History   • Marital status: /Civil Union     Spouse name: Not on file   • Number of children: Not on file   • Years of education: Not on file   • Highest education level: Not on file   Occupational History   • Not on file   Tobacco Use   • Smoking status: Never   • Smokeless tobacco: Never   Vaping Use   • Vaping status: Never Used   Substance and Sexual Activity   • Alcohol use: Yes     Alcohol/week: 2.0 standard drinks of alcohol     Types: 2 Cans of beer per week     Comment: weekends   • Drug use: No   • Sexual activity: Yes     Partners: Male     Birth control/protection: Post-menopausal     Comment:    Other Topics Concern   • Not on file   Social History Narrative    Activities: bicycling    Daily coffee consumption: 2 cp a day    Exercise: walking    Exercises moderately less than 3 times a week     Social Determinants of Health     Financial Resource Strain: Not on file   Food Insecurity: Not on file   Transportation Needs: Not on file   Physical Activity: Not on file    Stress: Not on file   Social Connections: Not on file   Intimate Partner Violence: Not on file   Housing Stability: Not on file       Current Outpatient Medications:   •  Coenzyme Q10 (COQ10) 100 MG CAPS, Take by mouth, Disp: , Rfl:   •  Ergocalciferol (Vitamin D2) 50 MCG (2000 UT) TABS, 1 daily, Disp: , Rfl: 0  •  hydrocortisone (WESTCORT) 0.2 % cream, Apply topically 2 (two) times a day, Disp: 45 g, Rfl: 3  •  lisinopril (ZESTRIL) 10 mg tablet, TAKE 1 TABLET BY MOUTH DAILY, Disp: 90 tablet, Rfl: 3  •  oxyCODONE-acetaminophen (Percocet) 5-325 mg per tablet, Take 1 tablet by mouth every 4 (four) hours as needed for moderate pain Max Daily Amount: 6 tablets, Disp: 10 tablet, Rfl: 0  •  patient supplied medication,  Formulated Probiotics, Disp: , Rfl:   •  lactobacillus acidophilus-bulgaricus (FLORANEX) packet, Take 1 packet by mouth (Patient not taking: Reported on 5/23/2024), Disp: , Rfl:   Allergies   Allergen Reactions   • Molds & Smuts Hives   • Other Allergic Rhinitis     Seasonal  Dust  Cats  mold     Vitals:    06/27/24 1249   BP: 170/90   Pulse: 74   Temp: 98.4 °F (36.9 °C)   SpO2: 99%       Physical Exam   Constitutional: General appearance: The Patient is well-developed and well-nourished who appears the stated age in no acute distress. Patient is pleasant and talkative.     HEENT:  Normocephalic.  Sclerae are anicteric. Mucous membranes are moist. Neck is supple without adenopathy. No JVD.  There is a palpable right thyroid nodule.  Chest: The lungs are clear to auscultation.     Cardiac: Heart is regular rate.     Abdomen: Abdomen is soft, non-tender, non-distended and without masses.     Extremities: There is no clubbing or cyanosis. There is no edema.  Symmetric.  Neuro: Grossly nonfocal. Gait is normal.     Lymphatic: No evidence of cervical adenopathy bilaterally.    Skin: Warm, anicteric.    Psych:  Patient is pleasant and talkative.  Breasts:      Pathology:  Final Diagnosis   A-B. Thyroid,  Right, mid (ThinPrep and smear preparations):  Atypia of undetermined significance ([place] Category III) - See note.  Atypical cells present.     Satisfactory for evaluation.      Note:  (1) As reported in the [place] System for Reporting Thyroid Cytopathology*, this diagnostic category has demonstrated anywhere from 10-30% risk of malignancy being found in subsequent resections and/or FNA.  This risk of malignancy is expected to change due to the usage of the surgical pathology diagnosis of “non-invasive follicular thyroid neoplasm with papillary-like nuclear features (NIFTP).”  The anticipated risk of malignancy secondary to NIFTP is 6-18%.    The manual reports that the usual management following this diagnosis is repeat FNA, molecular testing, or lobectomy. Ultimately, clinical/imaging correlation for this patient is needed in arriving at the actual management plan.     *The [place] System for Reporting Thyroid Cytopathology, Vaughn García., Artie Yanes (Eds.), 2018 (2nd ed.)         Electronically signed by Germán Moore MD on 5/10/2024 at  3:08 PM     AFIRMA is suspicious.    Labs:  Lab Results   Component Value Date    TSH 1.34 04/11/2024         Imaging  No results found.  I personally reviewed and interpreted the above laboratory and imaging data.

## 2024-06-27 NOTE — ASSESSMENT & PLAN NOTE
58-year-old female with right thyroid nodules.  The 1 has increased in size and is suspicious by Afirma.  We discussed that this has a 50% risk of malignancy on excision.  We discussed hemithyroidectomy versus total thyroidectomy.  If this is malignant with a high risk of structural recurrence, and she undergoes hemithyroidectomy, she will need completion thyroidectomy followed by radioactive iodine.  If this is benign and she undergoes hemithyroidectomy, there is a 25 to 30% risk of her needing thyroid hormone in her lifetime.  If this is benign and she undergoes total thyroidectomy, she will need thyroid hormone replacement.  She would like to have hemithyroidectomy.  I think this is completely reasonable since her TSH is normal.  The risks of right thyroid lobectomy were explained and included bleeding, infection, recurrence, need for further surgery, wound complications, hypocalcemia and recurrent laryngeal nerve injury with hoarseness.  Informed consent was obtained.  We will schedule this at our earliest mutual convenience.  We will obtain a neck ultrasound with lymph node mapping prior to surgery.  I will see her again at the time of surgery.  She is agreeable to this plan.  All her questions were answered.

## 2024-06-28 NOTE — PRE-PROCEDURE INSTRUCTIONS
Pre-Surgery Instructions:   Medication Instructions    Coenzyme Q10 (COQ10) 100 MG CAPS Stop taking 7 days prior to surgery.    Ergocalciferol (Vitamin D2) 50 MCG (2000 UT) TABS Stop taking 7 days prior to surgery.    hydrocortisone (WESTCORT) 0.2 % cream Hold day of surgery.    lisinopril (ZESTRIL) 10 mg tablet Take night before surgery    patient supplied medication Hold day of surgery.    Medication instructions for day surgery reviewed. Please use only a sip of water to take your instructed medications. Avoid aspirin and all over the counter vitamins, supplements and NSAIDS for one week prior to surgery per anesthesia guidelines. Tylenol is ok to take as needed.     You will receive a call one business day prior to surgery with an arrival time and hospital directions. If your surgery is scheduled on a Monday, the hospital will be calling you on the Friday prior to your surgery. If you have not heard from anyone by 8pm, please call the hospital supervisor through the hospital  at [phone]. ([place] [phone] or [place] [phone]).    Do not eat or drink anything after midnight the night before your surgery, including candy, mints, lifesavers, or chewing gum. Do not drink alcohol 24hrs before your surgery. Try not to smoke at least 24hrs before your surgery.       Follow the pre surgery showering instructions as listed in the “My Surgical Experience Booklet” or otherwise provided by your surgeon's office. Do not use a blade to shave the surgical area 1 week before surgery. It is okay to use a clean electric clippers up to 24 hours before surgery. Do not apply any lotions, creams, including makeup, cologne, deodorant, or perfumes after showering on the day of your surgery. Do not use dry shampoo, hair spray, hair gel, or any type of hair products.     No contact lenses, eye make-up, or artificial eyelashes. Remove nail polish, including gel polish, and any artificial, gel, or acrylic nails if  possible. Remove all jewelry including rings and body piercing jewelry.     Wear causal clothing that is easy to take on and off. Consider your type of surgery.    Keep any valuables, jewelry, piercings at home. Please bring any specially ordered equipment (sling, braces) if indicated.    Arrange for a responsible person to drive you to and from the hospital on the day of your surgery. Please confirm the visitor policy for the day of your procedure when you receive your phone call with an arrival time.     Call the surgeon's office with any new illnesses, exposures, or additional questions prior to surgery.    Please reference your “My Surgical Experience Booklet” for additional information to prepare for your upcoming surgery.

## 2024-07-03 ENCOUNTER — HOSPITAL ENCOUNTER (OUTPATIENT)
Dept: RADIOLOGY | Facility: HOSPITAL | Age: 59
Discharge: HOME | End: 2024-07-03
Attending: SURGERY
Payer: COMMERCIAL

## 2024-07-03 DIAGNOSIS — E04.2 MULTIPLE THYROID NODULES: ICD-10-CM

## 2024-07-03 PROCEDURE — 76536 US EXAM OF HEAD AND NECK: CPT

## 2024-07-08 NOTE — PROGRESS NOTES
ASSESSMENT/PLAN:    Hypertension  Excellent when she is not here  Continue lisinopril and lifestyle changes  Recheck in 6 months    Elevated fasting blood sugar  Check A1c before next visit    Low vitamin D  22  Began vitamin D 2000 units and recheck before next visit in 6 months    Thyroid nodules  Ultrasound showed none that were biopsy or the patient soon will probably not need to check ultrasound yearly because they have been quite stable for 5 years  Also follows with endocrine      Recheck in 6 months  Nonfasting blood work prior vitamin D and A1c  Recheck sooner if needed           Depression Screening and Follow-up Plan: Patient was screened for depression during today's encounter. They screened negative with a PHQ-2 score of 0.            Health Maintenance   Topic Date Due   • DTaP,Tdap,and Td Vaccines (2 - Td or Tdap) 05/20/2023   • Annual Physical  09/13/2023   • Influenza Vaccine (1) 09/01/2023   • Breast Cancer Screening: Mammogram  11/17/2023   • HIV Screening  09/13/2024 (Originally 12/18/1980)   • Hepatitis C Screening  10/13/2024 (Originally 1965)   • BMI: Followup Plan  05/26/2024   • BMI: Adult  07/10/2024   • Depression Screening  09/14/2024   • Colorectal Cancer Screening  07/16/2025   • Cervical Cancer Screening  08/04/2027   • Osteoporosis Screening  Completed   • COVID-19 Vaccine  Completed   • Pneumococcal Vaccine: Pediatrics (0 to 5 Years) and At-Risk Patients (6 to 59 Years)  Aged Out   • HIB Vaccine  Aged Out   • IPV Vaccine  Aged Out   • Hepatitis A Vaccine  Aged Out   • Meningococcal ACWY Vaccine  Aged Out   • HPV Vaccine  Aged Out         Problem List as of 9/14/2023 Reviewed: 7/10/2023  8:28 AM by Lali Reese DO    Closed avulsion fracture of right ankle    Dense breast tissue    Eczema    Ganglion    Low TSH level    Multiple thyroid nodules    TSH deficiency         Subjective:   Chief Complaint   Patient presents with   • Physical Exam   • Hypertension     Patient is here for both her physical and her blood pressure recheck  She started the 10 mg of lisinopril and is doing very well  Blood pressures taken outside of the office have been 122/72, 127/72, 114/66, and 133/72  She feels fine without any complaints    She had her fasting blood work and screen done for us to review today  We also reviewed her thyroid      patient ID: Sarah Woody is a 61 y.o. female. Patient's past medical history, surgical history, family history, social history, and Tobacco history reviewed with patient. MED LIST WAS REVIEWED AND UPDATED    ROS  As per HPI  Rest of 12 point review of systems negative     Objective:      VITALS:  Wt Readings from Last 3 Encounters:   09/14/23 78.9 kg (174 lb)   07/10/23 80.4 kg (177 lb 3.2 oz)   05/26/23 80.6 kg (177 lb 9.6 oz)     BP Readings from Last 3 Encounters:   09/14/23 140/80   07/10/23 132/82   05/26/23 162/100     Pulse Readings from Last 3 Encounters:   09/14/23 64   07/10/23 64   05/26/23 68     Body mass index is 28.51 kg/m². Laboratory Results: All pertinent labs and studies were reviewed with patient during this office visit with highlights of the results contained in this note in the ASSESSMENT AND PLAN section       Physical Exam    Gen.   No acute distress well-appearing well-nourished appears stated age    Mental status  Good judgment and insight oriented to time person and place, recent and remote memory intact mood and affect normal cooperative and patient is reasonable    HEENT  PERRLA 3 mm, EOMI without nystagmus, normocephalic atraumatic without facial weakness    Neck   supple no masses trachea midline positive click normal carotid upstrokes with no bruits    Cor  Regular rhythm without ectopy or murmur, no S3-S4, normal palpation that is no heave lift or thrill    Vascular  No edema, good pedal pulses    Lungs  CTA bilaterally in no respiratory distress no wheezes rhonchi or rales, normal to palpation no tactile fremitus    Abdomen  Soft, no palpable masses, no hepatosplenomegaly, normal bowel sounds, nontender    Lymphatics  No palpable nodes in the neck, supraclavicular area, axilla, or groin    Musculoskeletal  No clubbing cyanosis or edema muscle tone normal    Skin  no rashes or abnormal appearing lesions    Neuro  Normal ambulation, cranial nerves 2-12 grossly intact, higher functioning with reasoning intact. low fat diet

## 2024-07-10 ENCOUNTER — APPOINTMENT (INPATIENT)
Dept: RADIOLOGY | Facility: HOSPITAL | Age: 59
DRG: 627 | End: 2024-07-10
Payer: COMMERCIAL

## 2024-07-10 ENCOUNTER — HOSPITAL ENCOUNTER (INPATIENT)
Facility: HOSPITAL | Age: 59
LOS: 1 days | Discharge: HOME | DRG: 627 | End: 2024-07-11
Attending: SURGERY | Admitting: SURGERY
Payer: COMMERCIAL

## 2024-07-10 ENCOUNTER — ANESTHESIA EVENT (OUTPATIENT)
Dept: PERIOP | Facility: HOSPITAL | Age: 59
DRG: 627 | End: 2024-07-10
Payer: COMMERCIAL

## 2024-07-10 ENCOUNTER — ANESTHESIA (OUTPATIENT)
Dept: PERIOP | Facility: HOSPITAL | Age: 59
DRG: 627 | End: 2024-07-10
Payer: COMMERCIAL

## 2024-07-10 DIAGNOSIS — E04.2 MULTIPLE THYROID NODULES: Primary | ICD-10-CM

## 2024-07-10 PROCEDURE — 88307 TISSUE EXAM BY PATHOLOGIST: CPT | Performed by: STUDENT IN AN ORGANIZED HEALTH CARE EDUCATION/TRAINING PROGRAM

## 2024-07-10 PROCEDURE — 0BJ08ZZ INSPECTION OF TRACHEOBRONCHIAL TREE, VIA NATURAL OR ARTIFICIAL OPENING ENDOSCOPIC: ICD-10-PCS | Performed by: SURGERY

## 2024-07-10 PROCEDURE — 31622 DX BRONCHOSCOPE/WASH: CPT | Performed by: SURGERY

## 2024-07-10 PROCEDURE — 60220 PARTIAL REMOVAL OF THYROID: CPT | Performed by: SURGERY

## 2024-07-10 PROCEDURE — 0GTH0ZZ RESECTION OF RIGHT THYROID GLAND LOBE, OPEN APPROACH: ICD-10-PCS | Performed by: SURGERY

## 2024-07-10 PROCEDURE — 71045 X-RAY EXAM CHEST 1 VIEW: CPT

## 2024-07-10 RX ORDER — LISINOPRIL 10 MG/1
10 TABLET ORAL DAILY
Status: DISCONTINUED | OUTPATIENT
Start: 2024-07-10 | End: 2024-07-11 | Stop reason: HOSPADM

## 2024-07-10 RX ORDER — HYDROMORPHONE HCL IN WATER/PF 6 MG/30 ML
0.2 PATIENT CONTROLLED ANALGESIA SYRINGE INTRAVENOUS
Status: DISCONTINUED | OUTPATIENT
Start: 2024-07-10 | End: 2024-07-10 | Stop reason: HOSPADM

## 2024-07-10 RX ORDER — DEXAMETHASONE SODIUM PHOSPHATE 10 MG/ML
INJECTION, SOLUTION INTRAMUSCULAR; INTRAVENOUS AS NEEDED
Status: DISCONTINUED | OUTPATIENT
Start: 2024-07-10 | End: 2024-07-10

## 2024-07-10 RX ORDER — SODIUM CHLORIDE, SODIUM LACTATE, POTASSIUM CHLORIDE, CALCIUM CHLORIDE 600; 310; 30; 20 MG/100ML; MG/100ML; MG/100ML; MG/100ML
100 INJECTION, SOLUTION INTRAVENOUS CONTINUOUS
Status: DISCONTINUED | OUTPATIENT
Start: 2024-07-10 | End: 2024-07-11 | Stop reason: HOSPADM

## 2024-07-10 RX ORDER — MIDAZOLAM HYDROCHLORIDE 2 MG/2ML
INJECTION, SOLUTION INTRAMUSCULAR; INTRAVENOUS AS NEEDED
Status: DISCONTINUED | OUTPATIENT
Start: 2024-07-10 | End: 2024-07-10

## 2024-07-10 RX ORDER — LIDOCAINE HYDROCHLORIDE 10 MG/ML
INJECTION, SOLUTION EPIDURAL; INFILTRATION; INTRACAUDAL; PERINEURAL AS NEEDED
Status: DISCONTINUED | OUTPATIENT
Start: 2024-07-10 | End: 2024-07-10

## 2024-07-10 RX ORDER — ONDANSETRON 2 MG/ML
4 INJECTION INTRAMUSCULAR; INTRAVENOUS ONCE AS NEEDED
Status: DISCONTINUED | OUTPATIENT
Start: 2024-07-10 | End: 2024-07-10 | Stop reason: HOSPADM

## 2024-07-10 RX ORDER — ONDANSETRON 2 MG/ML
INJECTION INTRAMUSCULAR; INTRAVENOUS AS NEEDED
Status: DISCONTINUED | OUTPATIENT
Start: 2024-07-10 | End: 2024-07-10

## 2024-07-10 RX ORDER — LABETALOL HYDROCHLORIDE 5 MG/ML
10 INJECTION, SOLUTION INTRAVENOUS EVERY 4 HOURS PRN
Status: DISCONTINUED | OUTPATIENT
Start: 2024-07-10 | End: 2024-07-11 | Stop reason: HOSPADM

## 2024-07-10 RX ORDER — HYDRALAZINE HYDROCHLORIDE 20 MG/ML
INJECTION INTRAMUSCULAR; INTRAVENOUS AS NEEDED
Status: DISCONTINUED | OUTPATIENT
Start: 2024-07-10 | End: 2024-07-10

## 2024-07-10 RX ORDER — HYDROMORPHONE HCL/PF 1 MG/ML
SYRINGE (ML) INJECTION AS NEEDED
Status: DISCONTINUED | OUTPATIENT
Start: 2024-07-10 | End: 2024-07-10

## 2024-07-10 RX ORDER — LABETALOL HYDROCHLORIDE 5 MG/ML
INJECTION, SOLUTION INTRAVENOUS AS NEEDED
Status: DISCONTINUED | OUTPATIENT
Start: 2024-07-10 | End: 2024-07-10

## 2024-07-10 RX ORDER — ONDANSETRON 2 MG/ML
4 INJECTION INTRAMUSCULAR; INTRAVENOUS EVERY 4 HOURS PRN
Status: DISCONTINUED | OUTPATIENT
Start: 2024-07-10 | End: 2024-07-11 | Stop reason: HOSPADM

## 2024-07-10 RX ORDER — FENTANYL CITRATE 50 UG/ML
INJECTION, SOLUTION INTRAMUSCULAR; INTRAVENOUS AS NEEDED
Status: DISCONTINUED | OUTPATIENT
Start: 2024-07-10 | End: 2024-07-10

## 2024-07-10 RX ORDER — HYDROMORPHONE HCL/PF 1 MG/ML
0.5 SYRINGE (ML) INJECTION
Status: ACTIVE | OUTPATIENT
Start: 2024-07-10 | End: 2024-07-11

## 2024-07-10 RX ORDER — PROPOFOL 10 MG/ML
INJECTION, EMULSION INTRAVENOUS AS NEEDED
Status: DISCONTINUED | OUTPATIENT
Start: 2024-07-10 | End: 2024-07-10

## 2024-07-10 RX ORDER — PROPOFOL 10 MG/ML
INJECTION, EMULSION INTRAVENOUS CONTINUOUS PRN
Status: DISCONTINUED | OUTPATIENT
Start: 2024-07-10 | End: 2024-07-10

## 2024-07-10 RX ORDER — ACETAMINOPHEN 325 MG/1
975 TABLET ORAL EVERY 6 HOURS PRN
Status: DISCONTINUED | OUTPATIENT
Start: 2024-07-10 | End: 2024-07-11 | Stop reason: HOSPADM

## 2024-07-10 RX ORDER — OXYCODONE HYDROCHLORIDE 10 MG/1
10 TABLET ORAL EVERY 4 HOURS PRN
Status: DISCONTINUED | OUTPATIENT
Start: 2024-07-10 | End: 2024-07-11 | Stop reason: HOSPADM

## 2024-07-10 RX ORDER — SODIUM CHLORIDE, SODIUM LACTATE, POTASSIUM CHLORIDE, CALCIUM CHLORIDE 600; 310; 30; 20 MG/100ML; MG/100ML; MG/100ML; MG/100ML
INJECTION, SOLUTION INTRAVENOUS CONTINUOUS PRN
Status: DISCONTINUED | OUTPATIENT
Start: 2024-07-10 | End: 2024-07-10

## 2024-07-10 RX ORDER — LISINOPRIL 10 MG/1
10 TABLET ORAL DAILY
Status: DISCONTINUED | OUTPATIENT
Start: 2024-07-11 | End: 2024-07-10

## 2024-07-10 RX ORDER — ROCURONIUM BROMIDE 10 MG/ML
INJECTION, SOLUTION INTRAVENOUS AS NEEDED
Status: DISCONTINUED | OUTPATIENT
Start: 2024-07-10 | End: 2024-07-10

## 2024-07-10 RX ORDER — OXYCODONE HYDROCHLORIDE 5 MG/1
5 TABLET ORAL EVERY 4 HOURS PRN
Status: DISCONTINUED | OUTPATIENT
Start: 2024-07-10 | End: 2024-07-11 | Stop reason: HOSPADM

## 2024-07-10 RX ORDER — BUPIVACAINE HYDROCHLORIDE 2.5 MG/ML
INJECTION, SOLUTION EPIDURAL; INFILTRATION; INTRACAUDAL AS NEEDED
Status: DISCONTINUED | OUTPATIENT
Start: 2024-07-10 | End: 2024-07-10 | Stop reason: HOSPADM

## 2024-07-10 RX ADMIN — LISINOPRIL 10 MG: 10 TABLET ORAL at 23:01

## 2024-07-10 RX ADMIN — PROPOFOL 80 MCG/KG/MIN: 10 INJECTION, EMULSION INTRAVENOUS at 11:15

## 2024-07-10 RX ADMIN — HYDROMORPHONE HYDROCHLORIDE 0.5 MG: 1 INJECTION, SOLUTION INTRAMUSCULAR; INTRAVENOUS; SUBCUTANEOUS at 11:44

## 2024-07-10 RX ADMIN — FENTANYL CITRATE 100 MCG: 50 INJECTION INTRAMUSCULAR; INTRAVENOUS at 11:11

## 2024-07-10 RX ADMIN — ONDANSETRON 4 MG: 2 INJECTION INTRAMUSCULAR; INTRAVENOUS at 11:11

## 2024-07-10 RX ADMIN — DEXAMETHASONE SODIUM PHOSPHATE 10 MG: 10 INJECTION, SOLUTION INTRAMUSCULAR; INTRAVENOUS at 11:11

## 2024-07-10 RX ADMIN — SUGAMMADEX 200 MG: 100 INJECTION, SOLUTION INTRAVENOUS at 12:21

## 2024-07-10 RX ADMIN — MIDAZOLAM 2 MG: 1 INJECTION INTRAMUSCULAR; INTRAVENOUS at 11:01

## 2024-07-10 RX ADMIN — ROCURONIUM BROMIDE 50 MG: 10 INJECTION INTRAVENOUS at 11:11

## 2024-07-10 RX ADMIN — LIDOCAINE HYDROCHLORIDE 50 MG: 10 INJECTION, SOLUTION EPIDURAL; INFILTRATION; INTRACAUDAL at 11:11

## 2024-07-10 RX ADMIN — LABETALOL HYDROCHLORIDE 5 MG: 5 INJECTION, SOLUTION INTRAVENOUS at 12:06

## 2024-07-10 RX ADMIN — ACETAMINOPHEN 975 MG: 325 TABLET, FILM COATED ORAL at 22:48

## 2024-07-10 RX ADMIN — HYDRALAZINE HYDROCHLORIDE 5 MG: 20 INJECTION, SOLUTION INTRAMUSCULAR; INTRAVENOUS at 12:29

## 2024-07-10 RX ADMIN — SODIUM CHLORIDE, SODIUM LACTATE, POTASSIUM CHLORIDE, AND CALCIUM CHLORIDE: .6; .31; .03; .02 INJECTION, SOLUTION INTRAVENOUS at 11:01

## 2024-07-10 RX ADMIN — PROPOFOL 200 MG: 10 INJECTION, EMULSION INTRAVENOUS at 11:11

## 2024-07-10 NOTE — QUICK NOTE
"Post-Op Check    Assessment:  58F with R thyroid nodule now s/p R thyroid lobectomy.    Subjective:  Pt's pain is controlled. Denies nausea, chest pain, SOB. Voiding.    Objective:  VSS on RA.  /75   Pulse 68   Temp (!) 96.9 °F (36.1 °C)   Resp 16   Ht 5' 5\" (1.651 m)   Wt 83 kg (183 lb)   LMP  (LMP Unknown)   SpO2 99%   BMI 30.45 kg/m²     General: NAD. Neck CDI, no hematoma, vocal changes, no numbness/tingling. Nontender.  CV: nl rate  Lungs: nl wob. No resp distress.  ABD: Soft, ND, NT  Extrem: No CCE  "

## 2024-07-10 NOTE — PROGRESS NOTES
Anesthesia Note    Please see intra-op record for details of aspiration on induction.    I spoke to the patient and her  at length about the event. She followed NPO guidelines, and we followed standard of care, but sometimes aspiration of bile can still occur. I did not feel that postponing this was in her best interest, given the need to remove a potential malignancy. The details of the event are in the anesthetic record.    I discussed rapid sequence induction and intubation, and that her intubation was easy with a [place]ham 3 - this is a good option for her in the future if she's a procedure that requires intubation.    She feels well right now, with a minor cough, VSS. She is agreeable to staying over night, given the possible of a chemical pneumonitis developing over night. I told her this was not likely, but a good idea out of an abundance of caution to watch and that she will likely be fine and out tomorrow morning.    Brayden CARVAJAL Anesthesia

## 2024-07-10 NOTE — DISCHARGE INSTR - AVS FIRST PAGE
POST-OPERATIVE WOUND CARE INSTRUCTIONS    Your wound is closed with:   Sterile strips-white pieces of tape that hold your incision together      Wound care:   You may remove your dressing after 24 hours   You may shower using soap and water to clean your wound. Gently pat it dry.    You may redress your wound for comfort as needed.    Activity:   Did not perform any heavy lifting or strenuous physical activity for 7 days.   Your activity restrictions will be reevaluated at your postoperative visit.    Medications:   You may resume all your preoperative medications and diet.   Pain medication as directed on the prescription given in the office.    Other:   May use ice on the wound for 24-48 hours as needed for comfort.    May place warm compresses to the neck after 48 hours for comfort.         Call the office at [phone] if you have any of the followin. Redness, swelling, heat, unusual drainage or heavy bleeding from the wound.   2. Fever greater than 101°F.   3. Pain not relieved by the prescribed pain medication

## 2024-07-10 NOTE — INTERVAL H&P NOTE
H&P reviewed. After examining the patient I find no changes in the patients condition since the H&P had been written.    Vitals:    07/10/24 1021   BP: (!) 185/84   Pulse: 73   Resp: 18   Temp: 97.7 °F (36.5 °C)   SpO2: 100%

## 2024-07-10 NOTE — ANESTHESIA PREPROCEDURE EVALUATION
Procedure:  RIGHT THYROID LOBECTOMY (Right: Neck)    Relevant Problems   CARDIO   (+) Essential hypertension   (+) Mixed hyperlipidemia             Anesthesia Plan  ASA Score- 2     Anesthesia Type- general with ASA Monitors.         Additional Monitors:     Airway Plan: ETT.    Comment: General anesthesia, endotracheal tube; standard ASA monitors. Risks and benefits discussed with patient; patient consented and agrees to proceed.    I saw and evaluated the patient. If seen with CRNA, we have discussed the anesthetic plan and I am in agreement that the plan is appropriate for the patient.  .       Plan Factors-    Chart reviewed.   Existing labs reviewed.                   Induction- intravenous.    Postoperative Plan- Plan for postoperative opioid use. Planned trial extubation        Informed Consent- Anesthetic plan and risks discussed with patient.  I personally reviewed this patient with the CRNA. Discussed and agreed on the Anesthesia Plan with the CRNA..

## 2024-07-10 NOTE — PROCEDURES
Bronchoscopy (Bedside)    Date/Time: 7/10/2024 12:30 PM    Performed by: Richard Ramos MD  Authorized by: Richard Ramos MD    Patient location: OR.  Procedure performed by consultant: Dr. Owen.    Bumpass protocol:     Patient identity confirmed:  Arm band  Indications:     Procedure Purpose: diagnostic and therapeutic      Indications: other (comment)      Indications comment:  Concern for aspiration upon induction  Upper Airway:     Vocal cords movement comment:  N/A, ett in place    Trachea: normal      Rosa:  Normal  Procedure details:     Description:    Due to clinical concern for aspiration of gastric contents upon induction, bronchoscopy was performed.    Slim bronchoscope was inserted into the ET tube while anesthesia held respirations.  Lower trachea was visible beyond the ET tube, no abnormalities seen in the trachea or at the main rosa.  Identified main rosa and confirmed anatomy via visualization of right upper lobe.    Right upper lobe, right bronchus intermedius, right middle lobe and right lower lobe were clear without evidence of aspirated contents.    Inserted the bronchoscope down the left main bronchi.  Noted some bilious contents in the left mainstem leading down into the left lower lobe. One segmental bronchi of the lower lobe contained with bilious contents.  This was suctioned clear.  All other segmental bronchi of the left lung were visualized and clear.    Bronchoscope was withdrawn and resumed normal ventilation.    Post bronchoscopy, patient was saturating well with good ventilation, normal peak pressures.  Post-procedure details:     Post-procedure chest x-ray: Plan for PACU CXR.      Patient tolerance of procedure:  Tolerated well, no immediate complications    Incomplete procedure: Yes    Final Diagnosis/Findings:      Scant bilious contents in L mainstem bronchi and in one segmental bronchi consistent with mild aspiration.    Successful diagnostic and therapeutic  (suction removal of enteric contents)  bronchoscopy.    PACU chest x-ray will be obtained and patient will be admitted overnight for observation after discussion between the surgical team and anesthesia team.

## 2024-07-10 NOTE — OP NOTE
OPERATIVE REPORT  PATIENT NAME: Candi Mendoza    :  1965  MRN: [id number]  Pt Location: AN OR ROOM 02    SURGERY DATE: 7/10/2024    Surgeons and Role:     * Rafael Banerjee MD - Primary     * Richard Ramos MD - Assisting    Preop Diagnosis:  Multiple thyroid nodules [E04.2]    Post-Op Diagnosis Codes:     * Multiple thyroid nodules [E04.2]    Procedure(s):  Right - RIGHT THYROID LOBECTOMY    Specimen(s):  ID Type Source Tests Collected by Time Destination   1 : Right Thyroid Lobe and Isthmus (See comments) Tissue Thyroid, Right TISSUE EXAM Rafael Banerjee MD 7/10/2024 12:05 PM        Estimated Blood Loss:   Minimal    Drains:  * No LDAs found *    Anesthesia Type:   General    Operative Indications:  Multiple thyroid nodules [E04.2]  58-year-old female with a right thyroid nodule that was suspicious by Afirma.  It was recommended that she undergo hemithyroidectomy.  Risks and benefits were explained.  Informed consent was obtained.  Patient was brought to the operating room.    Operative Findings:  Recurrent laryngeal nerve was identified and preserved along its course.  No suspicious adenopathy.    Complications:   None    Procedure and Technique:  After identifying the patient, general anesthesia was achieved.  The patient then had a witnessed aspiration during the intubation.  A bronchoscopy was then performed that showed bilious material in the left lower lobe bronchus.  This was then suctioned out.  Given that this was a minimal amount, after discussion with anesthesia we elected to proceed.  Patient was prepped and draped in the usual sterile fashion.  A time-out was performed. A Kocher incision was made 2 cm above the sternal notch.  Using sharp dissection this was taken down through the skin, subcutaneous tissue and through the platysma.  Subplatysmal flaps were then raised.  Gelpi retractors were placed.  Strap muscles were divided in the midline. We started with the right thyroid.  The strap  muscles were dissected away from the thyroid.  Middle thyroid vein was divided with Harmonic scalpel.  Superior pole vessels were isolated and divided with the Harmonic scalpel.  Thyroid was rotated medially.  The superior and inferior parathyroids were identified and dissected away from the thyroid.  Recurrent laryngeal nerve was identified and preserved along its course. The thyroid continued to be rotated medially until we came to the ligament of Vila.  This was isolated and clamped.  The nerve was stimulated and found to be functioning.  The tissue above the clamp was divided and the tissue below the clamp was suture ligated with 3-0 Vicryl suture. The isthmus was cauterized off the trachea.  The thyroid was then divided at the junction of the left lobe and the isthmus.  There was excellent hemostasis.  Specimen was oriented and handed off the table.  Wound was copiously irrigated. There was excellent hemostasis except for 1 area at the ligament of Vila.  This was clipped and there was then excellent hemostasis..  The recurrent laryngeal nerve was stimulated and found to be functioning.  Both parathyroids were identified and were viable.  The wound was filled with saline. A Valsalva maneuver was performed and there was no evidence of bleeding or air bubbles.  The irrigant was removed.  Surgicel was placed in the resection bed.  Strap muscles were approximated with a running 2-0 Vicryl suture. Gelpi retractors were removed. There was excellent hemostasis.  Platysma was approximated with a running 3-0 Vicryl suture.  Skin was approximated with a running 4-0 Monocryl suture in a subcuticular fashion.  Steri-Strips and sterile dressings were applied. Patient was extubated having tolerated the procedure well. I was present and participated in all aspects of this procedure.       I was present for the entire procedure.    Patient Disposition:  PACU         SIGNATURE: Rafael Banerjee MD  DATE: July 10, 2024  TIME:  12:21 PM

## 2024-07-10 NOTE — ANESTHESIA POSTPROCEDURE EVALUATION
Post-Op Assessment Note    CV Status:  Stable  Pain Score: 0    Pain management: adequate       Mental Status:  Alert and awake   Hydration Status:  Euvolemic   PONV Controlled:  Controlled   Airway Patency:  Patent     Post Op Vitals Reviewed: Yes    Anethesia notable event occurred.    Staff: CRNA, Anesthesiologist   Comments: please see induction/intubation comments. Pt had aspiration, scant amount on induction.  VSS, pt stable throughut and afterwards. see note              BP   156/88   Temp   98   Pulse  65   Resp   15   SpO2   100

## 2024-07-11 ENCOUNTER — TRANSITIONAL CARE MANAGEMENT (OUTPATIENT)
Dept: FAMILY MEDICINE CLINIC | Facility: CLINIC | Age: 59
End: 2024-07-11

## 2024-07-11 VITALS
OXYGEN SATURATION: 94 % | SYSTOLIC BLOOD PRESSURE: 154 MMHG | HEIGHT: 65 IN | WEIGHT: 183 LBS | HEART RATE: 71 BPM | DIASTOLIC BLOOD PRESSURE: 77 MMHG | TEMPERATURE: 98.1 F | RESPIRATION RATE: 16 BRPM | BODY MASS INDEX: 30.49 KG/M2

## 2024-07-11 PROCEDURE — NC001 PR NO CHARGE: Performed by: SURGERY

## 2024-07-11 PROCEDURE — 99024 POSTOP FOLLOW-UP VISIT: CPT | Performed by: SURGERY

## 2024-07-11 RX ADMIN — SODIUM CHLORIDE, SODIUM LACTATE, POTASSIUM CHLORIDE, AND CALCIUM CHLORIDE 100 ML/HR: .6; .31; .03; .02 INJECTION, SOLUTION INTRAVENOUS at 05:21

## 2024-07-11 NOTE — ANESTHESIA PROCEDURE NOTES
Anesthesia Notable Event    Date/Time: 7/10/2024 11:00 AM    Patient location during procedure: OR procedure room  Performed by: Olayinka Owen MD  Authorized by: Olayinka Owen MD    Anesthesia notable event Intraoperative:aspiration  See intra-op notes for details and post-op progress note for details

## 2024-07-11 NOTE — PROGRESS NOTES
"Progress Note  Candi Mendoza 58 y.o. female MRN: [id number]  Unit/Bed#: S -01 Encounter: [id number]    Assessment  58 F with R thyroid nodule s/p hemithyroidectomy.    Plan  - Pain/nausea control  - OOB  - DVT ppx  - Goal DC 7/11    Subjective/Objective   NAOE.  Mild pain, well-controlled. Tolerating diet, no n/v.  No F/V.  Voiding. Walking.    VSS on RA.  /71   Pulse 83   Temp 99.1 °F (37.3 °C)   Resp 18   Ht 5' 5\" (1.651 m)   Wt 83 kg (183 lb)   LMP  (LMP Unknown)   SpO2 93%   BMI 30.45 kg/m²     Physical Exam:  General: NAD  Neck: Incision CDI.  No hematoma, no vocal changes, no numbness/tingling.  Nontender  CV: nl rate  Lungs: nl wob. No resp distress.  ABD: Soft, ND, NT  Extrem: No CCE      07/11/24 labs pending  No results for input(s): \"WBC\", \"HGB\", \"PLT\", \"SODIUM\", \"K\", \"CL\", \"CO2\", \"BUN\", \"CREATININE\", \"GLUC\", \"CALCIUM\", \"MG\", \"PHOS\", \"AST\", \"ALT\", \"ALKPHOS\", \"TBILI\", \"LIPASE\", \"EGFR\", \"CHOL\", \"PT\", \"PTT\", \"INR\" in the last 72 hours.    "

## 2024-07-11 NOTE — DISCHARGE SUMMARY
Discharge Summary - Surgical Oncology  Candi Mendoza 58 y.o. female MRN: [id number]  Unit/Bed#: S -01 Encounter: [id number]    Admission Date: 7/10/2024     Discharge Date: 7/11/2024     Admitting Diagnosis: Multiple thyroid nodules [E04.2]    Discharge Diagnosis: Principal Problem:    Multiple thyroid nodules  Resolved Problems:    * No resolved hospital problems. *      Attending and Service:   Rafael Banerjee MD;    Procedures Performed:   Right thyroid lobectomy    Hospital Course:   Candi Mendoza 58 y.o. female with a right thyroid nodule that was suspicious by Jacqueline, s/p right thyroid lobectomy. She had minimal bilious aspiration on induction, washed out under bronchoscopic lavage, which caused no follow up symptoms. 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    Patient was discharged on HD#1/POD1. On the day of discharge, the patient was voiding spontaneously, ambulating at baseline, and pain was well controlled. She had no SOB or dysphagia. Incision were C/D/I, without hematoma. She understood all instructions for discharge. She was also given the names and numbers of the providers as well as instructions for follow up appointments.     Condition at Discharge: good     Discharge instructions/Information to patient and family:   See after visit summary for information provided to patient and family.      Provisions for Follow-Up Care:  See after visit summary for information related to follow-up care and any pertinent home health orders.      Disposition: Home    Planned Readmission: No    Discharge Statement   I spent 30 minutes discharging the patient. This time was spent on the day of discharge. I had direct contact with the patient on the day of discharge. Additional documentation is required if more than 30 minutes were spent on discharge.     Discharge Medications:  See after visit summary for reconciled discharge  medications provided to patient and family.      Ozzy Mcgowan MD  7/21/2024  9:33 PM

## 2024-07-11 NOTE — UTILIZATION REVIEW
Initial Clinical Review    Elective outpatient procedure, converted to inpatient admission due to clinical concern for aspiration of gastric contents upon intubation      Elective  surgical procedure  Age/Sex: 58 y.o. female  Surgery Date: 7/10/24 @ 1133   Procedure: RIGHT THYROID LOBECTOMY   Anesthesia: general   Operative Findings:   Witnessed aspiration during the intubation.  A bronchoscopy was then performed that showed bilious material in the left lower lobe bronchus.  This was then suctioned out.  Given that this was a minimal amount, after discussion with anesthesia we elected to proceed.   Recurrent laryngeal nerve was identified and preserved along its course.  No suspicious adenopathy.        POD#1 Progress Note: 7/11   CXR done yesterday post op shows linear atelectasis L lung base . Incision C/D/I.No hematoma .   No SOB, normal work of breathing . No dysphagia or hoarse voice. No numbness/tingling . Tolerating diet. Pain controlled. Plan for d/c today    Admission Orders: Date/Time/Statement:   Admission Orders (From admission, onward)       Ordered        07/10/24 1227  Inpatient Admission  Once                          Orders Placed This Encounter   Procedures    Inpatient Admission     Standing Status:   Standing     Number of Occurrences:   1     Order Specific Question:   Level of Care     Answer:   Med Surg [16]     Order Specific Question:   Estimated length of stay     Answer:   More than 2 Midnights     Order Specific Question:   Certification     Answer:   I certify that inpatient services are medically necessary for this patient for a duration of greater than two midnights. See H&P and MD Progress Notes for additional information about the patient's course of treatment.       Vital Signs (last 3 days)       Date/Time Temp Pulse Resp BP MAP (mmHg) SpO2 O2 Flow Rate (L/min) O2 Device Carly Coma Scale Score Pain    07/11/24 0747 -- -- -- -- -- -- -- None (Room air) 15 2    07/11/24 07:39:55  98.1 °F (36.7 °C) 71 16 154/77 103 94 % -- -- -- --    07/10/24 23:03:20 -- 83 -- 142/71 95 93 % -- -- -- --    07/10/24 2248 -- -- -- -- -- -- -- -- -- 2    07/10/24 22:31:47 99.1 °F (37.3 °C) 86 18 145/71 96 94 % -- -- -- --    07/10/24 2130 -- -- -- -- -- -- -- None (Room air) 15 No Pain    07/10/24 20:48:12 99.2 °F (37.3 °C) 87 -- 148/77 101 92 % -- -- -- --    07/10/24 1730 -- 68 16 154/78 -- 98 % -- None (Room air) -- No Pain    07/10/24 1645 -- 66 20 174/82 -- 96 % -- None (Room air) -- No Pain    07/10/24 1615 -- 68 18 174/81 -- 98 % -- None (Room air) -- No Pain    07/10/24 1514 -- 68 16 163/75 -- 99 % -- None (Room air) -- --    07/10/24 1500 -- 71 20 178/83 93 99 % -- None (Room air) -- No Pain    07/10/24 1445 -- 68 20 147/77 105 97 % -- None (Room air) -- No Pain    07/10/24 1430 -- 62 20 145/73 103 95 % -- None (Room air) -- No Pain    07/10/24 1415 -- 64 20 157/81 112 97 % -- None (Room air) -- --    07/10/24 1400 -- 60 12 142/74 101 96 % -- None (Room air) 14 No Pain    07/10/24 1345 -- 56 12 144/77 104 96 % -- None (Room air) -- No Pain    07/10/24 1330 -- 52 11 145/73 103 96 % -- None (Room air) -- No Pain    07/10/24 1315 -- 52 15 130/67 91 95 % -- None (Room air) -- No Pain    07/10/24 1300 -- 50 11 125/70 94 95 % -- None (Room air) -- No Pain    07/10/24 1245 -- 54 15 132/65 92 94 % -- None (Room air) -- No Pain    07/10/24 1240 96.9 °F (36.1 °C) 52 20 180/75 108 95 % 6 L/min Simple mask 14 No Pain    07/10/24 1021 97.7 °F (36.5 °C) 73 18 185/84 -- 100 % -- None (Room air) -- No Pain          Weight (last 2 days)       Date/Time Weight    07/10/24 1021 83 (183)            Pertinent Labs/Diagnostic Test Results:   Radiology:  XR chest portable   Final Interpretation by Jus English MD (07/11 0733)   Linear atelectasis at the left lung base.            Workstation performed: [id number]           Cardiology:  No orders to display     GI:  Bronchoscopy (Bedside)    by Richard Ramos MD  (07/10 1237)   Right upper lobe, right bronchus intermedius, right middle lobe and right   lower lobe were clear without evidence of aspirated contents.     Inserted the bronchoscope down the left main bronchi.  Noted some bilious   contents in the left mainstem leading down into the left lower lobe. One   segmental bronchi of the lower lobe contained with bilious contents.  This   was suctioned clear.  All other segmental bronchi of the left lung were   visualized and clear.   Post bronchoscopy, patient was saturating well with good ventilation,   normal peak pressures.        Diet: reg diet   Mobility: UP as kacey  DVT Prophylaxis: SCD, ambulation     Medications/Pain Control:   Scheduled Medications:  lisinopril, 10 mg, Oral, Daily      Continuous IV Infusions:  lactated ringers, 100 mL/hr, Intravenous, Continuous      PRN Meds:  acetaminophen, 975 mg, Oral, Q6H PRN x1 7/10   labetalol, 10 mg, Intravenous, Q4H PRN  ondansetron, 4 mg, Intravenous, Q4H PRN  oxyCODONE, 10 mg, Oral, Q4H PRN  oxyCODONE, 5 mg, Oral, Q4H PRN        Network Utilization Review Department  ATTENTION: Please call with any questions or concerns to [phone] and carefully listen to the prompts so that you are directed to the right person. All voicemails are confidential.   For Discharge needs, contact Care Management DC Support Team at [phone] opt. 2  Send all requests for admission clinical reviews, approved or denied determinations and any other requests to dedicated fax number below belonging to the campus where the patient is receiving treatment. List of dedicated fax numbers for the Facilities:  FACILITY NAME UR FAX NUMBER   ADMISSION DENIALS (Administrative/Medical Necessity) [phone]   DISCHARGE SUPPORT TEAM (NETWORK) [phone]   PARENT CHILD HEALTH (Maternity/NICU/Pediatrics) [phone]   [facility] [phone]   [facility] [phone]   Community Health  [facility] [phone]   General acute hospital [phone]   [facility] [phone]   [facility] [phone]   [facility] [phone]   [facility] [phone]   [facility] [phone]   [facility] [phone]   [facility] [phone]   [facility] [phone]

## 2024-07-12 NOTE — UTILIZATION REVIEW
NOTIFICATION OF ADMISSION DISCHARGE   This is a Notification of Discharge from [facility]. Please be advised that this patient has been discharge from our facility. Below you will find the admission and discharge date and time including the patient’s disposition.   UTILIZATION REVIEW CONTACT:  Angie Ervin  Utilization   Network Utilization Review Department  Phone: [phone] x carefully listen to the prompts. All voicemails are confidential.  Email: [email or web address]     ADMISSION INFORMATION  PRESENTATION DATE: 7/10/2024 10:09 AM  OBERVATION ADMISSION DATE: N/A  INPATIENT ADMISSION DATE: 7/10/24 12:27 PM   DISCHARGE DATE: 7/11/2024  8:48 AM   DISPOSITION:Home/Self Care    Network Utilization Review Department  ATTENTION: Please call with any questions or concerns to [phone] and carefully listen to the prompts so that you are directed to the right person. All voicemails are confidential.   For Discharge needs, contact Care Management DC Support Team at [phone] opt. 2  Send all requests for admission clinical reviews, approved or denied determinations and any other requests to dedicated fax number below belonging to the campus where the patient is receiving treatment. List of dedicated fax numbers for the Facilities:  FACILITY NAME UR FAX NUMBER   ADMISSION DENIALS (Administrative/Medical Necessity) [phone]   DISCHARGE SUPPORT TEAM ([facility]) [phone]   PARENT CHILD HEALTH (Maternity/NICU/Pediatrics) [phone]   [facility] [phone]   [facility] [phone]   Critical access hospital [phone]   [facility] [phone]   [facility] [phone]   General acute hospital [phone]   [facility] [phone]   [facility] [phone]    [facility] [phone]   [facility] [phone]   [facility] [phone]   [facility] [phone]

## 2024-07-14 PROBLEM — E04.2 MULTIPLE THYROID NODULES: Chronic | Status: ACTIVE | Noted: 2018-10-01

## 2024-07-14 PROBLEM — Z90.09 HISTORY OF LOBECTOMY OF THYROID: Status: ACTIVE | Noted: 2024-07-10

## 2024-07-14 NOTE — PROGRESS NOTES
Transition of Care Visit  Name: Candi Mendoza      : 1965      MRN: [id number]  Encounter Provider: Hilaria Eden DO  Encounter Date: 2024   Encounter department: [facility]  Patient went in for elective hemithyroidectomy because of right-sided thyroid nodules with one node that was increased in size and suspicious for malignancy  Aspiration of ThinPrep showed atypia of undetermined significance  Because of that risk of cancer was discussed with patient by surgical oncology and together decided for patient to have a sided thyroidectomy  Surgery done with Dr. Rafael Banerjee    Bronchoscopy done prior to insertion of the ET tube due to clinical concern for aspiration of gastric contents  Slim bronchoscope inserted through the ET tube   Bilious contents and left mainstem bronchus in the left lower lobe seen.  It was suctioned clear    ASSESSMENT AND PLAN    Status post right thyroidectomy  For papillary thyroid carcinoma  To see surgery next week for further planning regarding follow-up    Status post aspiration on induction of anesthesia  Patient had increased coughing 2 or 3 days and now has been clear  Patient still has some rales at the right base  Demonstrated how to use incentive spirometry and she is to do it 3 times a day with 3 trials each time for the next 5 days    Hypertension  Controlled with lisinopril    Recheck as scheduled or as needed         History of Present Illness     Transitional Care Management Review:   Candi Mendoza is a 58 y.o. female here for TCM follow up.     During the TCM phone call patient stated:  TCM Call       Date and time call was made  2024 11:26 AM    Hospital care reviewed  Records reviewed    Patient was hospitialized at  [facility]    Date of Admission  07/10/24    Date of discharge  24    Diagnosis  Multiple thyroid nodules    Disposition  Home    Were the patients medications reviewed and  updated  Yes    Current Symptoms  None          TCM Call       Post hospital issues  None    Should patient be enrolled in anticoag monitoring?  No    Scheduled for follow up?  Yes    I have advised the patient to call PCP with any new or worsening symptoms  Kristy SWEET MA          HPI  Review of Systems  Objective     LMP  (LMP Unknown)     Physical Exam  Constitutional  Appears healthy, Looks well, Appearance consistent with age    Mental Status  Alert, Oriented, Cooperative, Memory function normal , clean, and reasonable    Neck  Incision is clean no evidence of infection nontender  No neck mass, No thyromegaly, Good carotid upstrokes bilaterally, trachea midline positive click    Respiratory  Breath sounds normal, fine Rales at the right base, No rhonchi, No wheezing, normal palpation    Cardiac  Regular rhythm without ectopy or murmur no S3-S4, no heave lift or thrill to palpation    Vascular  No leg edema, No pedal edema    Muscular skeletal  No clubbing cyanosis , muscle tone normal    Skin  No appreciable rashes or abnormal appearing lesions  Administrative Statements

## 2024-07-16 PROCEDURE — 88307 TISSUE EXAM BY PATHOLOGIST: CPT | Performed by: STUDENT IN AN ORGANIZED HEALTH CARE EDUCATION/TRAINING PROGRAM

## 2024-07-18 ENCOUNTER — OFFICE VISIT (OUTPATIENT)
Dept: FAMILY MEDICINE CLINIC | Facility: CLINIC | Age: 59
End: 2024-07-18
Payer: COMMERCIAL

## 2024-07-18 VITALS
TEMPERATURE: 98.4 F | BODY MASS INDEX: 29.92 KG/M2 | SYSTOLIC BLOOD PRESSURE: 160 MMHG | WEIGHT: 179.6 LBS | HEIGHT: 65 IN | HEART RATE: 68 BPM | RESPIRATION RATE: 18 BRPM | OXYGEN SATURATION: 97 % | DIASTOLIC BLOOD PRESSURE: 80 MMHG

## 2024-07-18 DIAGNOSIS — C73 PAPILLARY THYROID CARCINOMA (HCC): Primary | ICD-10-CM

## 2024-07-18 DIAGNOSIS — E04.2 MULTIPLE THYROID NODULES: ICD-10-CM

## 2024-07-18 DIAGNOSIS — E78.2 MIXED HYPERLIPIDEMIA: ICD-10-CM

## 2024-07-18 DIAGNOSIS — Z90.09 HISTORY OF LOBECTOMY OF THYROID: ICD-10-CM

## 2024-07-18 DIAGNOSIS — I10 ESSENTIAL HYPERTENSION: ICD-10-CM

## 2024-07-18 DIAGNOSIS — T17.908D ASPIRATION INTO AIRWAY, SUBSEQUENT ENCOUNTER: ICD-10-CM

## 2024-07-18 PROCEDURE — 99496 TRANSJ CARE MGMT HIGH F2F 7D: CPT | Performed by: FAMILY MEDICINE

## 2024-07-18 NOTE — PATIENT INSTRUCTIONS
Everything looks really good    Do the incentive spirometry as I showed you 3 times a day for the next 5 days    You back in September and get nonfasting blood work to check your calcium and your thyroid prior

## 2024-07-24 ENCOUNTER — OFFICE VISIT (OUTPATIENT)
Dept: SURGICAL ONCOLOGY | Facility: CLINIC | Age: 59
End: 2024-07-24
Payer: COMMERCIAL

## 2024-07-24 ENCOUNTER — TELEPHONE (OUTPATIENT)
Dept: SURGICAL ONCOLOGY | Facility: CLINIC | Age: 59
End: 2024-07-24

## 2024-07-24 VITALS
DIASTOLIC BLOOD PRESSURE: 94 MMHG | OXYGEN SATURATION: 96 % | WEIGHT: 182 LBS | RESPIRATION RATE: 16 BRPM | TEMPERATURE: 97.2 F | SYSTOLIC BLOOD PRESSURE: 166 MMHG | HEIGHT: 65 IN | BODY MASS INDEX: 30.32 KG/M2 | HEART RATE: 77 BPM

## 2024-07-24 DIAGNOSIS — C73 PAPILLARY THYROID CARCINOMA (HCC): Primary | ICD-10-CM

## 2024-07-24 PROCEDURE — 99212 OFFICE O/P EST SF 10 MIN: CPT | Performed by: SURGERY

## 2024-07-24 PROCEDURE — 99024 POSTOP FOLLOW-UP VISIT: CPT | Performed by: SURGERY

## 2024-07-24 NOTE — ASSESSMENT & PLAN NOTE
58-year-old female with right thyroid nodules. The 1 has increased in size and is suspicious by Afirma.  She then underwent a right thyroid lobectomy that revealed a [id number] papillary carcinoma of the thyroid.  There were no high risk features.  I would recommend that she undergo TSH suppression to decrease her risk of recurrence.  I do not believe she requires any radioactive iodine or completion thyroid lobectomy.  She will follow-up with endocrinology for management of her TSH suppression.  Her preoperative neck ultrasound with lymph node mapping was normal.  I will see her in 1 year with a repeat neck ultrasound with lymph node mapping. She is agreeable to this plan. All her questions were answered.   This office visit took 10 minutes of face-to-face time with the patient and greater than 50% of the time spent counseling and coordinating care for the newly diagnosed papillary carcinoma of the thyroid and its management.

## 2024-07-24 NOTE — LETTER
July 24, 2024     Hilaria Eden DO  [address]  [address]  [facility] [address]    Patient: Candi Mendoza   YOB: 1965   Date of Visit: 7/24/2024       Dear Dr. Eden:    Thank you for referring Candi Mendoza to me for evaluation. Below are my notes for this consultation.    If you have questions, please do not hesitate to call me. I look forward to following your patient along with you.         Sincerely,        Rafael Banerjee MD        CC: DO Rafael Baker MD  7/24/2024  8:33 AM  Sign when Signing Visit               Surgical Oncology Follow Up       [facility] SURGICAL ONCOLOGY [address] [address]    Candi Mendoza  1965  [id number]  [facility] SURGICAL ONCOLOGY [place]  [address]  [place] [address]    1. Papillary thyroid carcinoma (HCC)  Assessment & Plan:  58-year-old female with right thyroid nodules. The 1 has increased in size and is suspicious by Afirma.  She then underwent a right thyroid lobectomy that revealed a [id number] papillary carcinoma of the thyroid.  There were no high risk features.  I would recommend that she undergo TSH suppression to decrease her risk of recurrence.  I do not believe she requires any radioactive iodine or completion thyroid lobectomy.  She will follow-up with endocrinology for management of her TSH suppression.  Her preoperative neck ultrasound with lymph node mapping was normal.  I will see her in 1 year with a repeat neck ultrasound with lymph node mapping. She is agreeable to this plan. All her questions were answered.   This office visit took 10 minutes of face-to-face time with the patient and greater than 50% of the time spent counseling and coordinating care for the newly diagnosed papillary carcinoma of the thyroid and its management.  Orders:  -     US head neck lymph node mapping; Future; Expected  date: 07/15/2025         Chief Complaint   Patient presents with   • Post-op       Return in about 1 year (around 7/24/2025) for Ofice visit short, Imaging - See orders, with Nohemy.      Oncology History   Papillary thyroid carcinoma (HCC)   7/10/2024 Surgery    Thyroid, Right Lobe and Isthmus, Hemithyroidectomy:  - Papillary thyroid carcinoma, classic type     7/18/2024 Initial Diagnosis    Papillary thyroid carcinoma (HCC)     7/24/2024 -  Cancer Staged    Staging form: Thyroid - Differentiated and Anaplastic, AJCC 8th Edition  - Pathologic: Stage Unknown (pT1a, pNX, cM0) - Signed by Rafael Banerjee MD on 7/24/2024  Lymph node metastasis: Unknown           Staging: [id number] papillary carcinoma of the right thyroid, no high risk features, July 2024  Treatment history: Right thyroid lobectomy, July 2024  Current treatment: TSH suppression  Disease status: CARLOS    History of Present Illness: Patient returns in follow-up of her thyroid lobectomy.  She is doing well with no complaints.  No fevers or chills.  No dysphagia or hoarseness.  Pathology did reveal papillary carcinoma.    Review of Systems  Complete ROS Surg Onc:   Complete ROS Surg Onc:   Constitutional: The patient denies new or recent history of general fatigue, no recent weight loss, no change in appetite.   Eyes: No complaints of visual problems, no scleral icterus.   ENT: no complaints of ear pain, no hoarseness, no difficulty swallowing,  no tinnitus and no new masses in head, oral cavity, or neck.   Cardiovascular: No complaints of chest pain, no palpitations, no ankle edema.   Respiratory: No complaints of shortness of breath, no cough.   Gastrointestinal: No complaints of jaundice, no bloody stools, no pale stools.   Genitourinary: No complaints of dysuria, no hematuria, no nocturia, no frequent urination, no urethral discharge.   Musculoskeletal: No complaints of weakness, paralysis, joint stiffness or arthralgias.  Integumentary: No complaints of rash,  no new lesions.   Neurological: No complaints of convulsions, no seizures, no dizziness.   Hematologic/Lymphatic: No complaints of easy bruising.   Endocrine:  No hot or cold intolerance.  No polydipsia, polyphagia, or polyuria.  Allergy/immunology:  No environmental allergies.  No food allergies.  Not immunocompromised.  Skin:  No pallor or rash.  No wound.        Patient Active Problem List   Diagnosis   • Eczema   • Ganglion   • Dense breast tissue   • Multiple thyroid nodules   • Essential hypertension   • Vitamin D deficiency   • Mixed hyperlipidemia   • History of lobectomy of thyroid   • Papillary thyroid carcinoma (HCC)     Past Medical History:   Diagnosis Date   • Blood type, Rh negative    • Dysphagia     only with swallowing large pills   • Environmental and seasonal allergies    • Ganglion cyst of wrist, right    • Hx of migraines    • Hypertension 2023   • Miscarriage 1994   • Multiple thyroid nodules    • Overweight    • Varicella      Past Surgical History:   Procedure Laterality Date   •  SECTION, LOW TRANSVERSE     • CHROMOSOME ANALYSIS, PRODUCTS OF CONCEPTION (HISTORICAL)      surgical treatment of missed    • COLONOSCOPY     • INDUCED       by dilation and evacuation   • AK TOTAL THYROID LOBECTOMY UNI W/WO ISTHMUSECTOMY Right 7/10/2024    Procedure: RIGHT THYROID LOBECTOMY;  Surgeon: Rafael Banerjee MD;  Location: [facility];  Service: Surgical Oncology   • US GUIDED THYROID BIOPSY  2024   • WISDOM TOOTH EXTRACTION       Family History   Problem Relation Age of Onset   • Hypertension Mother    • Throat cancer Mother         laryngeal   • Stroke Mother         syndrome   • Substance Abuse Mother    • Thyroid disease Mother         Hashimoto’s   • Lung cancer Mother 72   • Cancer Mother         Lung, throat   • Esophageal cancer Father 68   • Stroke Father         syndrome   • Cancer Father         Esophageal   • Ovarian cancer Sister         Half-sister  (same mother)   • No Known Problems Daughter    • Hypertension Maternal Grandmother    • Thyroid disease Maternal Grandmother         Hyperthyroidism   • Throat cancer Maternal Grandfather 70   • Hypertension Maternal Grandfather    • Stroke Maternal Grandfather         syndrome   • Cancer Maternal Grandfather         Throat   • Emphysema Paternal Grandmother    • Alzheimer's disease Paternal Grandfather    • Other Son         racing heart rhythm   • Heart murmur Son    • Ovarian cancer Half-Sister 38   • No Known Problems Half-Sister    • No Known Problems Half-Brother    • No Known Problems Half-Brother    • Mental illness Neg Hx      Social History     Socioeconomic History   • Marital status: /Civil Union     Spouse name: Not on file   • Number of children: Not on file   • Years of education: Not on file   • Highest education level: Not on file   Occupational History   • Not on file   Tobacco Use   • Smoking status: Never   • Smokeless tobacco: Never   Vaping Use   • Vaping status: Never Used   Substance and Sexual Activity   • Alcohol use: Yes     Alcohol/week: 2.0 standard drinks of alcohol     Types: 2 Cans of beer per week     Comment: weekends   • Drug use: No   • Sexual activity: Yes     Partners: Male     Birth control/protection: Post-menopausal     Comment:    Other Topics Concern   • Not on file   Social History Narrative    Activities: bicycling    Daily coffee consumption: 2 cp a day    Exercise: walking    Exercises moderately less than 3 times a week     Social Determinants of Health     Financial Resource Strain: Not on file   Food Insecurity: Not on file   Transportation Needs: Not on file   Physical Activity: Not on file   Stress: Not on file   Social Connections: Not on file   Intimate Partner Violence: Not on file   Housing Stability: Not on file       Current Outpatient Medications:   •  Coenzyme Q10 (COQ10) 100 MG CAPS, Take by mouth, Disp: , Rfl:   •  Ergocalciferol (Vitamin  D2) 50 MCG (2000 UT) TABS, 1 daily, Disp: , Rfl: 0  •  hydrocortisone (WESTCORT) 0.2 % cream, Apply topically 2 (two) times a day, Disp: 45 g, Rfl: 3  •  lisinopril (ZESTRIL) 10 mg tablet, TAKE 1 TABLET BY MOUTH DAILY, Disp: 90 tablet, Rfl: 3  •  patient supplied medication, Dr Formulated Probiotics, Disp: , Rfl:   Allergies   Allergen Reactions   • Molds & Smuts Hives   • Other Allergic Rhinitis     Seasonal  Dust  Cats  mold     Vitals:    07/24/24 0807   BP: 166/94   Pulse: 77   Resp: 16   Temp: (!) 97.2 °F (36.2 °C)   SpO2: 96%       Physical Exam  Constitutional: General appearance: The Patient is well-developed and well-nourished who appears the stated age in no acute distress. Patient is pleasant and talkative.     HEENT:  Normocephalic.  Sclerae are anicteric. Mucous membranes are moist. Neck is supple without adenopathy. No JVD.  Incision is C/D/I.     Abdomen: Abdomen is soft, non-tender, non-distended and without masses.     Extremities: There is no clubbing or cyanosis. There is no edema.  Symmetric.  Neuro: Grossly nonfocal. Gait is normal.     Skin: Warm, anicteric.    Psych:  Patient is pleasant and talkative.  Breasts:        Pathology:  Final Diagnosis   A. Thyroid, Right Lobe and Isthmus, Hemithyroidectomy:  - Papillary thyroid carcinoma, classic type (1 cm).  - See synoptic report.      Electronically signed by Rancho Enriquez DO on 7/16/2024 at  1:02 PM   Note    Intradepartmental consultation is in agreement.     Dr. Dariana DO and Dr. Chriss MD were notified of these findings via Epic secure message on 7/16/2024 at 1 pm.   Additional Information    All reported additional testing was performed with appropriately reactive controls.  These tests were developed and their performance characteristics determined by [facility] or appropriate performing facility, though some tests may be performed on tissues which have not been validated for performance characteristics (such as  staining performed on alcohol exposed cell blocks and decalcified tissues).  Results should be interpreted with caution and in the context of the patients’ clinical condition. These tests may not be cleared or approved by the U.S. Food and Drug Administration, though the FDA has determined that such clearance or approval is not necessary. These tests are used for clinical purposes and they should not be regarded as investigational or for research. This laboratory has been approved by [place] [id number], designated as a high-complexity laboratory and is qualified to perform these tests.     Interpretation performed at [facility] [address] [place] [address] Way, [place] [address].   Synoptic Checklist   THYROID GLAND   8th Edition - Protocol posted: 3/22/2023THYROID GLAND - All Specimens  SPECIMEN   Procedure  Right lobectomy with isthmusectomy (hemithyroidectomy)   TUMOR   Tumor Focality  Unifocal   Tumor Characteristics     Tumor Site  Right lobe   Tumor Size  Greatest Dimension (Centimeters): 1 cm   Additional Dimension (Centimeters)  0.6 cm     0.4 cm   Histologic Tumor Types and Subtypes  Papillary carcinoma, classic subtype   Tumor Proliferative Activity     Mitotic Rate  Less than 3 mitoses per 2mm2   Tumor Necrosis  Not identified   Angioinvasion (vascular invasion)  Not identified   Lymphatic Invasion  Not identified   Perineural Invasion  Not identified   Extrathyroidal Extension  Not identified   Margin Status  All margins negative for carcinoma   Distance from Invasive Carcinoma to Closest Margin  Less than 1 mm   REGIONAL LYMPH NODES   Regional Lymph Node Status  Not applicable (no regional lymph nodes submitted or found)   pTNM CLASSIFICATION (AJCC 8th Edition)   Reporting of pT, pN, and (when applicable) pM categories is based on information available to the pathologist at the time the report is issued. As per the AJCC (Chapter 1, 8th Ed.) it is the managing physician’s responsibility to establish the final  pathologic stage based upon all pertinent information, including but potentially not limited to this pathology report.        pT Category  pT1a   pN Category  pN not assigned (no nodes submitted or found)          Labs:      Imaging  XR chest portable    Result Date: 7/11/2024  Narrative: XR CHEST PORTABLE INDICATION: PACU. COMPARISON: None FINDINGS: Linear atelectasis at the left lung base. No pneumothorax or pleural effusion. Mild cardiomegaly. Bones are unremarkable for age. Normal upper abdomen.     Impression: Linear atelectasis at the left lung base. Workstation performed: [id number]     Bronchoscopy (Bedside)    Result Date: 7/10/2024  Narrative: Richard Ramos MD     7/10/2024 12:37 PM Bronchoscopy (Bedside) Date/Time: 7/10/2024 12:30 PM Performed by: Richard Ramos MD Authorized by: Richard Ramos MD  Patient location: OR. Procedure performed by consultant: Dr. Owen.  Universal protocol:   Patient identity confirmed:  Arm band Indications:   Procedure Purpose: diagnostic and therapeutic    Indications: other (comment)    Indications comment:  Concern for aspiration upon induction Upper Airway:   Vocal cords movement comment:  N/A, ett in place   Trachea: normal    Rosa:  Normal Procedure details:   Description:  Due to clinical concern for aspiration of gastric contents upon induction, bronchoscopy was performed. Slim bronchoscope was inserted into the ET tube while anesthesia held respirations.  Lower trachea was visible beyond the ET tube, no abnormalities seen in the trachea or at the main rosa.  Identified main rosa and confirmed anatomy via visualization of right upper lobe. Right upper lobe, right bronchus intermedius, right middle lobe and right lower lobe were clear without evidence of aspirated contents. Inserted the bronchoscope down the left main bronchi.  Noted some bilious contents in the left mainstem leading down into the left lower lobe. One segmental bronchi of the lower  lobe contained with bilious contents.  This was suctioned clear.  All other segmental bronchi of the left lung were visualized and clear. Bronchoscope was withdrawn and resumed normal ventilation. Post bronchoscopy, patient was saturating well with good ventilation, normal peak pressures. Post-procedure details:   Post-procedure chest x-ray: Plan for PACU CXR.    Patient tolerance of procedure:  Tolerated well, no immediate complications   Incomplete procedure: Yes  Final Diagnosis/Findings:    Scant bilious contents in L mainstem bronchi and in one segmental bronchi consistent with mild aspiration. Successful diagnostic and therapeutic (suction removal of enteric contents)  bronchoscopy. PACU chest x-ray will be obtained and patient will be admitted overnight for observation after discussion between the surgical team and anesthesia team.    US head neck lymph node mapping    Result Date: 7/3/2024  Narrative: NECK ULTRASOUND INDICATION: E04.2: Nontoxic multinodular goiter. COMPARISON: Multiple priors most recently ultrasound guided thyroid biopsy 5/7/2024 FINDINGS: Ultrasound of the neck and cervical lymph node chains was performed with a high frequency linear transducer. No suspicious neck mass identified. Previously seen right lobe thyroid nodule again imaged. Lymph nodes maintain normal morphologic contour, echogenicity and short axis dimensions of less than 0.7 cm. No evidence for microcalcification or focal nodularity.     Impression: No suspicious lymphadenopathy identified. Workstation performed: [id number]     XR chest pa & lateral    Result Date: 6/27/2024  Narrative: CHEST INDICATION:   Nontoxic multinodular goiter. COMPARISON:  None. EXAM PERFORMED/VIEWS:  XR CHEST PA & LATERAL FINDINGS: Cardiomediastinal silhouette appears unremarkable. The lungs are clear.  No pneumothorax or pleural effusion. Osseous structures appear within normal limits for patient age.     Impression: No acute cardiopulmonary  disease. Electronically signed: 06/27/2024 03:00 PM Carmine Andrade MD    I personally reviewed and interpreted the above laboratory and imaging data.

## 2024-07-24 NOTE — PROGRESS NOTES
Surgical Oncology Follow Up       [facility] SURGICAL ONCOLOGY SAMINA  [address] [facility] [place] [address]    Candi PERALTA Reji  1965  [id number]  [facility] SURGICAL ONCOLOGY SAMINA  [facility] [address]    1. Papillary thyroid carcinoma (HCC)  Assessment & Plan:  58-year-old female with right thyroid nodules. The 1 has increased in size and is suspicious by Afirma.  She then underwent a right thyroid lobectomy that revealed a [id number] papillary carcinoma of the thyroid.  There were no high risk features.  I would recommend that she undergo TSH suppression to decrease her risk of recurrence.  I do not believe she requires any radioactive iodine or completion thyroid lobectomy.  She will follow-up with endocrinology for management of her TSH suppression.  Her preoperative neck ultrasound with lymph node mapping was normal.  I will see her in 1 year with a repeat neck ultrasound with lymph node mapping. She is agreeable to this plan. All her questions were answered.   This office visit took 10 minutes of face-to-face time with the patient and greater than 50% of the time spent counseling and coordinating care for the newly diagnosed papillary carcinoma of the thyroid and its management.  Orders:  -     US head neck lymph node mapping; Future; Expected date: 07/15/2025         Chief Complaint   Patient presents with    Post-op       Return in about 1 year (around 7/24/2025) for Ofice visit short, Imaging - See orders, with Nohemy.      Oncology History   Papillary thyroid carcinoma (HCC)   7/10/2024 Surgery    Thyroid, Right Lobe and Isthmus, Hemithyroidectomy:  - Papillary thyroid carcinoma, classic type     7/18/2024 Initial Diagnosis    Papillary thyroid carcinoma (HCC)     7/24/2024 -  Cancer Staged    Staging form: Thyroid - Differentiated and Anaplastic, AJCC 8th Edition  - Pathologic: Stage Unknown  (pT1a, pNX, cM0) - Signed by Rafael Banerjee MD on 7/24/2024  Lymph node metastasis: Unknown           Staging: [id number] papillary carcinoma of the right thyroid, no high risk features, July 2024  Treatment history: Right thyroid lobectomy, July 2024  Current treatment: TSH suppression  Disease status: CARLOS    History of Present Illness: Patient returns in follow-up of her thyroid lobectomy.  She is doing well with no complaints.  No fevers or chills.  No dysphagia or hoarseness.  Pathology did reveal papillary carcinoma.    Review of Systems  Complete ROS Surg Onc:   Complete ROS Surg Onc:   Constitutional: The patient denies new or recent history of general fatigue, no recent weight loss, no change in appetite.   Eyes: No complaints of visual problems, no scleral icterus.   ENT: no complaints of ear pain, no hoarseness, no difficulty swallowing,  no tinnitus and no new masses in head, oral cavity, or neck.   Cardiovascular: No complaints of chest pain, no palpitations, no ankle edema.   Respiratory: No complaints of shortness of breath, no cough.   Gastrointestinal: No complaints of jaundice, no bloody stools, no pale stools.   Genitourinary: No complaints of dysuria, no hematuria, no nocturia, no frequent urination, no urethral discharge.   Musculoskeletal: No complaints of weakness, paralysis, joint stiffness or arthralgias.  Integumentary: No complaints of rash, no new lesions.   Neurological: No complaints of convulsions, no seizures, no dizziness.   Hematologic/Lymphatic: No complaints of easy bruising.   Endocrine:  No hot or cold intolerance.  No polydipsia, polyphagia, or polyuria.  Allergy/immunology:  No environmental allergies.  No food allergies.  Not immunocompromised.  Skin:  No pallor or rash.  No wound.        Patient Active Problem List   Diagnosis    Eczema    Ganglion    Dense breast tissue    Multiple thyroid nodules    Essential hypertension    Vitamin D deficiency    Mixed hyperlipidemia     History of lobectomy of thyroid    Papillary thyroid carcinoma (HCC)     Past Medical History:   Diagnosis Date    Blood type, Rh negative     Dysphagia     only with swallowing large pills    Environmental and seasonal allergies     Ganglion cyst of wrist, right     Hx of migraines     Hypertension 2023    Miscarriage 1994    Multiple thyroid nodules     Overweight     Varicella 1976     Past Surgical History:   Procedure Laterality Date     SECTION, LOW TRANSVERSE      CHROMOSOME ANALYSIS, PRODUCTS OF CONCEPTION (HISTORICAL)      surgical treatment of missed     COLONOSCOPY      INDUCED       by dilation and evacuation    TN TOTAL THYROID LOBECTOMY UNI W/WO ISTHMUSECTOMY Right 7/10/2024    Procedure: RIGHT THYROID LOBECTOMY;  Surgeon: Rafael Banerjee MD;  Location: [facility];  Service: Surgical Oncology    US GUIDED THYROID BIOPSY  2024    WISDOM TOOTH EXTRACTION       Family History   Problem Relation Age of Onset    Hypertension Mother     Throat cancer Mother         laryngeal    Stroke Mother         syndrome    Substance Abuse Mother     Thyroid disease Mother         Hashimoto’s    Lung cancer Mother 72    Cancer Mother         Lung, throat    Esophageal cancer Father 68    Stroke Father         syndrome    Cancer Father         Esophageal    Ovarian cancer Sister         Half-sister (same mother)    No Known Problems Daughter     Hypertension Maternal Grandmother     Thyroid disease Maternal Grandmother         Hyperthyroidism    Throat cancer Maternal Grandfather 70    Hypertension Maternal Grandfather     Stroke Maternal Grandfather         syndrome    Cancer Maternal Grandfather         Throat    Emphysema Paternal Grandmother     Alzheimer's disease Paternal Grandfather     Other Son         racing heart rhythm    Heart murmur Son     Ovarian cancer Half-Sister 38    No Known Problems Half-Sister     No Known Problems Half-Brother     No Known Problems Half-Brother      Mental illness Neg Hx      Social History     Socioeconomic History    Marital status: /Civil Union     Spouse name: Not on file    Number of children: Not on file    Years of education: Not on file    Highest education level: Not on file   Occupational History    Not on file   Tobacco Use    Smoking status: Never    Smokeless tobacco: Never   Vaping Use    Vaping status: Never Used   Substance and Sexual Activity    Alcohol use: Yes     Alcohol/week: 2.0 standard drinks of alcohol     Types: 2 Cans of beer per week     Comment: weekends    Drug use: No    Sexual activity: Yes     Partners: Male     Birth control/protection: Post-menopausal     Comment:    Other Topics Concern    Not on file   Social History Narrative    Activities: bicycling    Daily coffee consumption: 2 cp a day    Exercise: walking    Exercises moderately less than 3 times a week     Social Determinants of Health     Financial Resource Strain: Not on file   Food Insecurity: Not on file   Transportation Needs: Not on file   Physical Activity: Not on file   Stress: Not on file   Social Connections: Not on file   Intimate Partner Violence: Not on file   Housing Stability: Not on file       Current Outpatient Medications:     Coenzyme Q10 (COQ10) 100 MG CAPS, Take by mouth, Disp: , Rfl:     Ergocalciferol (Vitamin D2) 50 MCG (2000 UT) TABS, 1 daily, Disp: , Rfl: 0    hydrocortisone (WESTCORT) 0.2 % cream, Apply topically 2 (two) times a day, Disp: 45 g, Rfl: 3    lisinopril (ZESTRIL) 10 mg tablet, TAKE 1 TABLET BY MOUTH DAILY, Disp: 90 tablet, Rfl: 3    patient supplied medication,  Formulated Probiotics, Disp: , Rfl:   Allergies   Allergen Reactions    Molds & Smuts Hives    Other Allergic Rhinitis     Seasonal  Dust  Cats  mold     Vitals:    07/24/24 0807   BP: 166/94   Pulse: 77   Resp: 16   Temp: (!) 97.2 °F (36.2 °C)   SpO2: 96%       Physical Exam  Constitutional: General appearance: The Patient is well-developed and  well-nourished who appears the stated age in no acute distress. Patient is pleasant and talkative.     HEENT:  Normocephalic.  Sclerae are anicteric. Mucous membranes are moist. Neck is supple without adenopathy. No JVD.  Incision is C/D/I.     Abdomen: Abdomen is soft, non-tender, non-distended and without masses.     Extremities: There is no clubbing or cyanosis. There is no edema.  Symmetric.  Neuro: Grossly nonfocal. Gait is normal.     Skin: Warm, anicteric.    Psych:  Patient is pleasant and talkative.  Breasts:        Pathology:  Final Diagnosis   A. Thyroid, Right Lobe and Isthmus, Hemithyroidectomy:  - Papillary thyroid carcinoma, classic type (1 cm).  - See synoptic report.      Electronically signed by Rancho Enriquez DO on 7/16/2024 at  1:02 PM   Note    Intradepartmental consultation is in agreement.     Dr. Dariana DO and Dr. Chriss MD were notified of these findings via Epic secure message on 7/16/2024 at 1 pm.   Additional Information    All reported additional testing was performed with appropriately reactive controls.  These tests were developed and their performance characteristics determined by [facility] or appropriate performing facility, though some tests may be performed on tissues which have not been validated for performance characteristics (such as staining performed on alcohol exposed cell blocks and decalcified tissues).  Results should be interpreted with caution and in the context of the patients’ clinical condition. These tests may not be cleared or approved by the U.S. Food and Drug Administration, though the FDA has determined that such clearance or approval is not necessary. These tests are used for clinical purposes and they should not be regarded as investigational or for research. This laboratory has been approved by CLIA 88, designated as a high-complexity laboratory and is qualified to perform these tests.     Interpretation performed at  [facility] [address] [place] [address] Way, [place] [address].   Synoptic Checklist   THYROID GLAND   8th Edition - Protocol posted: 3/22/2023THYROID GLAND - All Specimens  SPECIMEN   Procedure  Right lobectomy with isthmusectomy (hemithyroidectomy)   TUMOR   Tumor Focality  Unifocal   Tumor Characteristics     Tumor Site  Right lobe   Tumor Size  Greatest Dimension (Centimeters): 1 cm   Additional Dimension (Centimeters)  0.6 cm     0.4 cm   Histologic Tumor Types and Subtypes  Papillary carcinoma, classic subtype   Tumor Proliferative Activity     Mitotic Rate  Less than 3 mitoses per 2mm2   Tumor Necrosis  Not identified   Angioinvasion (vascular invasion)  Not identified   Lymphatic Invasion  Not identified   Perineural Invasion  Not identified   Extrathyroidal Extension  Not identified   Margin Status  All margins negative for carcinoma   Distance from Invasive Carcinoma to Closest Margin  Less than 1 mm   REGIONAL LYMPH NODES   Regional Lymph Node Status  Not applicable (no regional lymph nodes submitted or found)   pTNM CLASSIFICATION (AJCC 8th Edition)   Reporting of pT, pN, and (when applicable) pM categories is based on information available to the pathologist at the time the report is issued. As per the AJCC (Chapter 1, 8th Ed.) it is the managing physician’s responsibility to establish the final pathologic stage based upon all pertinent information, including but potentially not limited to this pathology report.        pT Category  pT1a   pN Category  pN not assigned (no nodes submitted or found)          Labs:      Imaging  XR chest portable    Result Date: 7/11/2024  Narrative: XR CHEST PORTABLE INDICATION: PACU. COMPARISON: None FINDINGS: Linear atelectasis at the left lung base. No pneumothorax or pleural effusion. Mild cardiomegaly. Bones are unremarkable for age. Normal upper abdomen.     Impression: Linear atelectasis at the left lung base. Workstation performed: [id number]     Bronchoscopy  (Bedside)    Result Date: 7/10/2024  Narrative: Richard Ramos MD     7/10/2024 12:37 PM Bronchoscopy (Bedside) Date/Time: 7/10/2024 12:30 PM Performed by: Richard Ramos MD Authorized by: Richard Ramos MD  Patient location: OR. Procedure performed by consultant: Dr. Owen.  Universal protocol:   Patient identity confirmed:  Arm band Indications:   Procedure Purpose: diagnostic and therapeutic    Indications: other (comment)    Indications comment:  Concern for aspiration upon induction Upper Airway:   Vocal cords movement comment:  N/A, ett in place   Trachea: normal    Rosa:  Normal Procedure details:   Description:  Due to clinical concern for aspiration of gastric contents upon induction, bronchoscopy was performed. Slim bronchoscope was inserted into the ET tube while anesthesia held respirations.  Lower trachea was visible beyond the ET tube, no abnormalities seen in the trachea or at the main rosa.  Identified main rosa and confirmed anatomy via visualization of right upper lobe. Right upper lobe, right bronchus intermedius, right middle lobe and right lower lobe were clear without evidence of aspirated contents. Inserted the bronchoscope down the left main bronchi.  Noted some bilious contents in the left mainstem leading down into the left lower lobe. One segmental bronchi of the lower lobe contained with bilious contents.  This was suctioned clear.  All other segmental bronchi of the left lung were visualized and clear. Bronchoscope was withdrawn and resumed normal ventilation. Post bronchoscopy, patient was saturating well with good ventilation, normal peak pressures. Post-procedure details:   Post-procedure chest x-ray: Plan for PACU CXR.    Patient tolerance of procedure:  Tolerated well, no immediate complications   Incomplete procedure: Yes  Final Diagnosis/Findings:    Scant bilious contents in L mainstem bronchi and in one segmental bronchi consistent with mild aspiration.  Successful diagnostic and therapeutic (suction removal of enteric contents)  bronchoscopy. PACU chest x-ray will be obtained and patient will be admitted overnight for observation after discussion between the surgical team and anesthesia team.    US head neck lymph node mapping    Result Date: 7/3/2024  Narrative: NECK ULTRASOUND INDICATION: E04.2: Nontoxic multinodular goiter. COMPARISON: Multiple priors most recently ultrasound guided thyroid biopsy 5/7/2024 FINDINGS: Ultrasound of the neck and cervical lymph node chains was performed with a high frequency linear transducer. No suspicious neck mass identified. Previously seen right lobe thyroid nodule again imaged. Lymph nodes maintain normal morphologic contour, echogenicity and short axis dimensions of less than 0.7 cm. No evidence for microcalcification or focal nodularity.     Impression: No suspicious lymphadenopathy identified. Workstation performed: [employer]     XR chest pa & lateral    Result Date: 6/27/2024  Narrative: CHEST INDICATION:   Nontoxic multinodular goiter. COMPARISON:  None. EXAM PERFORMED/VIEWS:  XR CHEST PA & LATERAL FINDINGS: Cardiomediastinal silhouette appears unremarkable. The lungs are clear.  No pneumothorax or pleural effusion. Osseous structures appear within normal limits for patient age.     Impression: No acute cardiopulmonary disease. Electronically signed: 06/27/2024 03:00 PM Carmine Andrade MD    I personally reviewed and interpreted the above laboratory and imaging data.

## 2024-07-30 DIAGNOSIS — Z98.890 HISTORY OF THYROID SURGERY: ICD-10-CM

## 2024-07-30 DIAGNOSIS — E04.2 MULTIPLE THYROID NODULES: Primary | ICD-10-CM

## 2024-08-18 LAB
25(OH)D3 SERPL-MCNC: 46 NG/ML (ref 30–100)
ALBUMIN SERPL-MCNC: 4.1 G/DL (ref 3.6–5.1)
ALBUMIN/GLOB SERPL: 1.7 (CALC) (ref 1–2.5)
ALP SERPL-CCNC: 50 U/L (ref 37–153)
ALT SERPL-CCNC: 12 U/L (ref 6–29)
APPEARANCE UR: CLEAR
AST SERPL-CCNC: 13 U/L (ref 10–35)
BACTERIA UR QL AUTO: NORMAL /HPF
BILIRUB SERPL-MCNC: 0.6 MG/DL (ref 0.2–1.2)
BILIRUB UR QL STRIP: NEGATIVE
BUN SERPL-MCNC: 18 MG/DL (ref 7–25)
BUN/CREAT SERPL: NORMAL (CALC) (ref 6–22)
CALCIUM SERPL-MCNC: 9 MG/DL (ref 8.6–10.4)
CHLORIDE SERPL-SCNC: 104 MMOL/L (ref 98–110)
CO2 SERPL-SCNC: 27 MMOL/L (ref 20–32)
COLOR UR: YELLOW
CREAT SERPL-MCNC: 0.94 MG/DL (ref 0.5–1.03)
GFR/BSA.PRED SERPLBLD CYS-BASED-ARV: 70 ML/MIN/1.73M2
GLOBULIN SER CALC-MCNC: 2.4 G/DL (CALC) (ref 1.9–3.7)
GLUCOSE SERPL-MCNC: 93 MG/DL (ref 65–99)
GLUCOSE UR QL STRIP: NEGATIVE
HGB UR QL STRIP: NEGATIVE
HYALINE CASTS #/AREA URNS LPF: NORMAL /LPF
KETONES UR QL STRIP: NEGATIVE
LEUKOCYTE ESTERASE UR QL STRIP: NEGATIVE
NITRITE UR QL STRIP: NEGATIVE
PH UR STRIP: 5.5 [PH] (ref 5–8)
POTASSIUM SERPL-SCNC: 4.5 MMOL/L (ref 3.5–5.3)
PROT SERPL-MCNC: 6.5 G/DL (ref 6.1–8.1)
PROT UR QL STRIP: NEGATIVE
RBC #/AREA URNS HPF: NORMAL /HPF
SODIUM SERPL-SCNC: 139 MMOL/L (ref 135–146)
SP GR UR STRIP: 1.02 (ref 1–1.03)
SQUAMOUS #/AREA URNS HPF: NORMAL /HPF
T4 FREE SERPL-MCNC: 0.8 NG/DL (ref 0.8–1.8)
TSH SERPL-ACNC: 2.39 MIU/L (ref 0.4–4.5)
WBC #/AREA URNS HPF: NORMAL /HPF

## 2024-09-06 ENCOUNTER — OFFICE VISIT (OUTPATIENT)
Dept: ENDOCRINOLOGY | Facility: CLINIC | Age: 59
End: 2024-09-06
Payer: COMMERCIAL

## 2024-09-06 VITALS
DIASTOLIC BLOOD PRESSURE: 88 MMHG | SYSTOLIC BLOOD PRESSURE: 148 MMHG | BODY MASS INDEX: 30.62 KG/M2 | HEIGHT: 65 IN | WEIGHT: 183.8 LBS

## 2024-09-06 DIAGNOSIS — Z90.09 HISTORY OF LOBECTOMY OF THYROID: ICD-10-CM

## 2024-09-06 DIAGNOSIS — C73 PAPILLARY THYROID CARCINOMA (HCC): Primary | ICD-10-CM

## 2024-09-06 PROCEDURE — 99214 OFFICE O/P EST MOD 30 MIN: CPT | Performed by: INTERNAL MEDICINE

## 2024-09-06 NOTE — PROGRESS NOTES
Assessment and Plan:  1. Papillary thyroid carcinoma (HCC)  Assessment & Plan:  History of thyroid nodules, s/p biopsy showing Newton class III, atypia of undetermined significance   S/p right lobectomy [place] 10, 2024, pathology showing classic subtype papillary thyroid cancer, stage T1a NX M0, measuring 1 cm, negative for high risk features  Follows with surgical oncology Dr. Banerjee  TSH 2.39, free T40.8  Patient has low risk of recurrence of disease based on staging, size, and lack of high risk features    Goal TSH is 0.5-2.0  Hold on levothyroxine therapy for now given patient's low risk of recurrence.  Reassess with TSH in 1-2 months which will guide decision on initiating thyroid supplementation  Has order for surveillance mapping ultrasound in 1 year  Follow-up in 1 year  Orders:  -     TSH, 3rd generation; Future; Expected date: 10/06/2024  2. History of lobectomy of thyroid  -     TSH, 3rd generation; Future; Expected date: 10/06/2024           HPI:   Candi Mendoza -  58 y.o. female with history of papillary thyroid cancer s/p right-sided lobectomy, that arrives to clinic for follow-up on papillary thyroid carcinoma s/p surgery.    Patient had history of multinodular goiter, underwent ultrasound showing increased size of right midpole nodule and biopsy which showed finding of Newton class III atypia of undetermined significance. Underwent Afirma testing which showed approximately 50% risk of cancer.  On July 10, 2024 underwent right-sided lobectomy per surgical oncology and pathology demonstrated classic subtype papillary thyroid cancer with stage T1a NX M0.     Family history is significant for grandmother with hyperthyroidism, mother with hypothyroidism and is negative for thyroid cancer.      Patient follows with Dr. Banerjee (surgical oncology).    Currently, Candi denies any experiencing changes in mood, heart rate, bowel habits, lower extremity swelling, changes in hair or nails, changes in weight and  appetite.      Patient has no other complaints at this time.      Subjective:  Review of Systems   Constitutional:  Negative for appetite change, diaphoresis, fatigue and unexpected weight change.   Eyes:  Negative for visual disturbance.   Respiratory:  Negative for shortness of breath.    Cardiovascular:  Negative for palpitations and leg swelling.   Gastrointestinal:  Negative for abdominal pain, constipation, diarrhea and nausea.   Endocrine: Negative for cold intolerance and heat intolerance.   Musculoskeletal:  Negative for myalgias.   Skin:         Tight scar   Neurological:  Negative for dizziness, tremors, weakness, light-headedness and headaches.   Psychiatric/Behavioral:  Negative for dysphoric mood and sleep disturbance. The patient is not nervous/anxious.         Patient Active Problem List   Diagnosis   • Eczema   • Ganglion   • Dense breast tissue   • Multiple thyroid nodules   • Essential hypertension   • Vitamin D deficiency   • Mixed hyperlipidemia   • History of lobectomy of thyroid   • Papillary thyroid carcinoma (HCC)        Current Outpatient Medications   Medication Sig Dispense Refill   • Coenzyme Q10 (COQ10) 100 MG CAPS Take by mouth     • Ergocalciferol (Vitamin D2) 50 MCG (2000 UT) TABS 1 daily  0   • hydrocortisone (WESTCORT) 0.2 % cream Apply topically 2 (two) times a day 45 g 3   • lisinopril (ZESTRIL) 10 mg tablet TAKE 1 TABLET BY MOUTH DAILY 90 tablet 3   • patient supplied medication Dr Formulated Probiotics       No current facility-administered medications for this visit.        Allergies   Allergen Reactions   • Molds & Smuts Hives   • Other Allergic Rhinitis     Seasonal  Dust  Cats  mold        The following portions of the patient's history were reviewed and updated as appropriate: allergies, current medications, past family history, past medical history, past social history, past surgical history and problem list.             Objective:  /88 (BP Location: Left arm,  "Patient Position: Sitting, Cuff Size: Adult)   Ht 5' 5\" (1.651 m)   Wt 83.4 kg (183 lb 12.8 oz)   LMP  (LMP Unknown)   BMI 30.59 kg/m²      Body mass index is 30.59 kg/m².     Physical Exam  Vitals reviewed.   Constitutional:       Appearance: Normal appearance. She is obese. She is not ill-appearing or diaphoretic.   HENT:      Head: Normocephalic and atraumatic.   Eyes:      General: No scleral icterus.     Conjunctiva/sclera: Conjunctivae normal.   Neck:      Thyroid: No thyroid mass, thyromegaly or thyroid tenderness.      Comments: Lower neck scar  Cardiovascular:      Rate and Rhythm: Normal rate and regular rhythm.      Pulses: Normal pulses.      Heart sounds: Normal heart sounds. No murmur heard.     No gallop.   Pulmonary:      Effort: Pulmonary effort is normal. No respiratory distress.      Breath sounds: Normal breath sounds. No wheezing or rales.   Abdominal:      General: Bowel sounds are normal. There is no distension.      Palpations: Abdomen is soft.   Musculoskeletal:         General: Normal range of motion.      Cervical back: Normal range of motion and neck supple.      Right lower leg: No edema.      Left lower leg: No edema.   Lymphadenopathy:      Cervical: No cervical adenopathy.   Skin:     General: Skin is warm and dry.      Coloration: Skin is not jaundiced or pale.   Neurological:      General: No focal deficit present.      Mental Status: She is alert and oriented to person, place, and time. Mental status is at baseline.      Deep Tendon Reflexes:      Reflex Scores:       Bicep reflexes are 2+ on the right side and 2+ on the left side.  Psychiatric:         Mood and Affect: Mood normal.         Behavior: Behavior normal.          Labs:  I have personally reviewed the following labs.   Latest Reference Range & Units 04/11/24 10:21 08/17/24 07:16   TSH, POC 0.40 - 4.50 mIU/L 1.34 2.39   FREE T4 0.8 - 1.8 ng/dL 1.1 0.8        Imaging:  I have personally reviewed the following " imaging.  Date 7/3/2024  Narrative & Impression   NECK ULTRASOUND     INDICATION: E04.2: Nontoxic multinodular goiter.     COMPARISON: Multiple priors most recently ultrasound guided thyroid biopsy 5/7/2024     FINDINGS:     Ultrasound of the neck and cervical lymph node chains was performed with a high frequency linear transducer.     No suspicious neck mass identified. Previously seen right lobe thyroid nodule again imaged.     Lymph nodes maintain normal morphologic contour, echogenicity and short axis dimensions of less than 0.7 cm. No evidence for microcalcification or focal nodularity.     IMPRESSION:     No suspicious lymphadenopathy identified.         Jerry Ohara MD  Endocrinology Fellow, PGY-4

## 2024-09-06 NOTE — PATIENT INSTRUCTIONS
You have a lab order for TSH level. This can be done in November 2024. This will guide the decision on starting thyroid supplementation with levothyroxine.

## 2024-09-06 NOTE — ASSESSMENT & PLAN NOTE
History of thyroid nodules, s/p biopsy showing Lipscomb class III, atypia of undetermined significance   S/p right lobectomy [place] 10, 2024, pathology showing classic subtype papillary thyroid cancer, stage T1a NX M0, measuring 1 cm, negative for high risk features  Follows with surgical oncology Dr. Banerjee  TSH 2.39, free T40.8  Patient has low risk of recurrence of disease based on staging, size, and lack of high risk features    Goal TSH is 0.5-2.0  Hold on levothyroxine therapy for now given patient's low risk of recurrence.  Reassess with TSH in 1-2 months which will guide decision on initiating thyroid supplementation  Has order for surveillance mapping ultrasound in 1 year  Follow-up in 1 year

## 2024-09-17 ENCOUNTER — OFFICE VISIT (OUTPATIENT)
Dept: FAMILY MEDICINE CLINIC | Facility: CLINIC | Age: 59
End: 2024-09-17
Payer: COMMERCIAL

## 2024-09-17 ENCOUNTER — ANNUAL EXAM (OUTPATIENT)
Dept: OBGYN CLINIC | Facility: CLINIC | Age: 59
End: 2024-09-17
Payer: COMMERCIAL

## 2024-09-17 VITALS
HEIGHT: 66 IN | WEIGHT: 183 LBS | SYSTOLIC BLOOD PRESSURE: 130 MMHG | BODY MASS INDEX: 29.41 KG/M2 | DIASTOLIC BLOOD PRESSURE: 80 MMHG

## 2024-09-17 VITALS
SYSTOLIC BLOOD PRESSURE: 144 MMHG | WEIGHT: 183.7 LBS | HEART RATE: 78 BPM | OXYGEN SATURATION: 97 % | DIASTOLIC BLOOD PRESSURE: 80 MMHG | HEIGHT: 66 IN | BODY MASS INDEX: 29.52 KG/M2 | RESPIRATION RATE: 18 BRPM

## 2024-09-17 DIAGNOSIS — Z78.0 MENOPAUSE: ICD-10-CM

## 2024-09-17 DIAGNOSIS — C73 PAPILLARY THYROID CARCINOMA (HCC): ICD-10-CM

## 2024-09-17 DIAGNOSIS — Z00.00 ROUTINE GENERAL MEDICAL EXAMINATION AT A HEALTH CARE FACILITY: ICD-10-CM

## 2024-09-17 DIAGNOSIS — Z12.31 ENCOUNTER FOR SCREENING MAMMOGRAM FOR MALIGNANT NEOPLASM OF BREAST: ICD-10-CM

## 2024-09-17 DIAGNOSIS — Z01.419 ENCOUNTER FOR GYNECOLOGICAL EXAMINATION WITHOUT ABNORMAL FINDING: Primary | ICD-10-CM

## 2024-09-17 DIAGNOSIS — I10 ESSENTIAL HYPERTENSION: Primary | ICD-10-CM

## 2024-09-17 DIAGNOSIS — Z13.820 SCREENING FOR OSTEOPOROSIS: ICD-10-CM

## 2024-09-17 DIAGNOSIS — Z90.09 HISTORY OF LOBECTOMY OF THYROID: ICD-10-CM

## 2024-09-17 DIAGNOSIS — E78.2 MIXED HYPERLIPIDEMIA: ICD-10-CM

## 2024-09-17 DIAGNOSIS — L30.9 ECZEMA, UNSPECIFIED TYPE: ICD-10-CM

## 2024-09-17 DIAGNOSIS — E55.9 VITAMIN D DEFICIENCY: ICD-10-CM

## 2024-09-17 DIAGNOSIS — Z23 NEED FOR VACCINATION: ICD-10-CM

## 2024-09-17 DIAGNOSIS — E04.2 MULTIPLE THYROID NODULES: Chronic | ICD-10-CM

## 2024-09-17 PROCEDURE — 90673 RIV3 VACCINE NO PRESERV IM: CPT

## 2024-09-17 PROCEDURE — 99396 PREV VISIT EST AGE 40-64: CPT | Performed by: FAMILY MEDICINE

## 2024-09-17 PROCEDURE — 99396 PREV VISIT EST AGE 40-64: CPT | Performed by: OBSTETRICS & GYNECOLOGY

## 2024-09-17 PROCEDURE — 90471 IMMUNIZATION ADMIN: CPT

## 2024-09-17 PROCEDURE — 99214 OFFICE O/P EST MOD 30 MIN: CPT | Performed by: FAMILY MEDICINE

## 2024-09-17 RX ORDER — HYDROCORTISONE VALERATE CREAM 2 MG/G
CREAM TOPICAL 2 TIMES DAILY
Qty: 45 G | Refills: 3 | Status: SHIPPED | OUTPATIENT
Start: 2024-09-17

## 2024-09-17 RX ORDER — LISINOPRIL 10 MG/1
10 TABLET ORAL DAILY
Qty: 90 TABLET | Refills: 3 | Status: SHIPPED | OUTPATIENT
Start: 2024-09-17

## 2024-09-17 NOTE — PROGRESS NOTES
ASSESSMENT/PLAN:    Status post right thyroidectomy last papillary thyroid cancer  No evidence of cancer on recent screening  TSH around 2  Endocrine is watching and will probably suppress in the near future, patient has TSH ordered for November 2024    Hypertension  Remains excellent on lisinopril especially when she is not here  Patient's blood pressures taken at the pharmacy are excellent, please see sheet dated September August and July 2024.  Average systolic is around 120 and average diastolic is around 74  Continue lisinopril 10 mg daily  Increase aerobic exercise 3 and half hours weekly     Elevated fasting blood sugar  A1c 5.3 no evidence of diabetes     High LDL  Diet only     Low vitamin D  Continue vitamin D 2000 units daily  Vitamin D normal at 46      Thyroid nodules  Gaby's thyroid ultrasound ordered by endocrine        Recheck in 6 months  Recheck sooner if needed  Check blood pressure as well as status of her TSH through endocrine    Call and make appointment for mammogram and hopefully DEXA together for on or after 2024-11-21          Health Maintenance   Topic Date Due    COVID-19 Vaccine (5 - 2023-24 season) 09/01/2024    Annual Physical  09/14/2024    Influenza Vaccine (1) 09/01/2024    Breast Cancer Screening: Mammogram  11/20/2024    Hepatitis C Screening  10/13/2024 (Originally 1965)    HIV Screening  09/17/2026 (Originally 12/18/1979)    RSV Vaccine Age 60+ Years (1 - 1-dose 60+ series) 12/18/2024    Colorectal Cancer Screening  07/16/2025    Depression Screening  09/17/2025    Cervical Cancer Screening  08/04/2027    DTaP,Tdap,and Td Vaccines (3 - Td or Tdap) 01/01/2028    Zoster Vaccine  Completed    RSV Vaccine age 0-20 Months  Aged Out    Pneumococcal Vaccine: Pediatrics (0 to 5 Years) and At-Risk Patients (6 to 64 Years)  Aged Out    HIB Vaccine  Aged Out    IPV Vaccine  Aged Out    Hepatitis A Vaccine  Aged Out    Meningococcal ACWY Vaccine  Aged Out    HPV Vaccine  Aged Out          Problem List as of 9/17/2024 Reviewed: 7/24/2024  8:31 AM by Rafael Banerjee MD      Dense breast tissue    Eczema    Essential hypertension    Ganglion    History of lobectomy of thyroid    Mixed hyperlipidemia    Multiple thyroid nodules    Last Assessment & Plan 6/27/2024 Office Visit Written 6/27/2024  1:26 PM by Rafael Banerjee MD     58-year-old female with right thyroid nodules.  The 1 has increased in size and is suspicious by Afirma.  We discussed that this has a 50% risk of malignancy on excision.  We discussed hemithyroidectomy versus total thyroidectomy.  If this is malignant with a high risk of structural recurrence, and she undergoes hemithyroidectomy, she will need completion thyroidectomy followed by radioactive iodine.  If this is benign and she undergoes hemithyroidectomy, there is a 25 to 30% risk of her needing thyroid hormone in her lifetime.  If this is benign and she undergoes total thyroidectomy, she will need thyroid hormone replacement.  She would like to have hemithyroidectomy.  I think this is completely reasonable since her TSH is normal.  The risks of right thyroid lobectomy were explained and included bleeding, infection, recurrence, need for further surgery, wound complications, hypocalcemia and recurrent laryngeal nerve injury with hoarseness.  Informed consent was obtained.  We will schedule this at our earliest mutual convenience.  We will obtain a neck ultrasound with lymph node mapping prior to surgery.  I will see her again at the time of surgery.  She is agreeable to this plan.  All her questions were answered.         Papillary thyroid carcinoma (HCC)    Last Assessment & Plan 9/6/2024 Office Visit Edited 9/6/2024  5:36 PM by Jerry Ohara MD     History of thyroid nodules, s/p biopsy showing Sebree class III, atypia of undetermined significance   S/p right lobectomy Sienna 10, 2024, pathology showing classic subtype papillary thyroid cancer, stage T1a NX M0,  measuring 1 cm, negative for high risk features  Follows with surgical oncology Dr. Banerjee  TSH 2.39, free T40.8  Patient has low risk of recurrence of disease based on staging, size, and lack of high risk features    Goal TSH is 0.5-2.0  Hold on levothyroxine therapy for now given patient's low risk of recurrence.  Reassess with TSH in 1-2 months which will guide decision on initiating thyroid supplementation  Has order for surveillance mapping ultrasound in 1 year  Follow-up in 1 year         Vitamin D deficiency         Subjective:   Chief Complaint   Patient presents with    Physical Exam     Patient is here for her recheck on her blood pressure and also on her thyroid and for her physical  Blood work since last visit was reviewed with patient  Patient feels fine without any problems    Office visits from endocrine and surgical oncology also reviewed        patient ID: Candi Mendoza is a 58 y.o. female.    Patient's past medical history, surgical history, family history, social history, and Tobacco history reviewed with patient.     MED LIST WAS REVIEWED AND UPDATED    ROS  As per HPI  Rest of 12 point review of systems negative     Objective:      VITALS:  Wt Readings from Last 3 Encounters:   09/17/24 83.3 kg (183 lb 11.2 oz)   09/06/24 83.4 kg (183 lb 12.8 oz)   07/24/24 82.6 kg (182 lb)     BP Readings from Last 3 Encounters:   09/17/24 144/80   09/06/24 148/88   07/24/24 166/94     Pulse Readings from Last 3 Encounters:   09/17/24 78   07/24/24 77   07/18/24 68     Body mass index is 30.1 kg/m².    Laboratory Results:   All pertinent labs and studies were reviewed with patient during this office visit with highlights of the results contained in this note in the ASSESSMENT AND PLAN section       Physical Exam    Gen.  No acute distress well-appearing well-nourished appears stated age    Mental status  Good judgment and insight oriented to time person and place, recent and remote memory intact mood and affect  normal cooperative and patient is reasonable    HEENT  PERRLA 3 mm, EOMI without nystagmus, normocephalic atraumatic without facial weakness    Neck   supple no masses trachea midline positive click normal carotid upstrokes with no bruits    Cor  Regular rhythm without ectopy or murmur, no S3-S4, normal palpation that is no heave lift or thrill    Vascular  No edema, good pedal pulses    Lungs  CTA bilaterally in no respiratory distress no wheezes rhonchi or rales, normal to palpation no tactile fremitus    Abdomen  Soft, no palpable masses, no hepatosplenomegaly, normal bowel sounds, nontender    Lymphatics  No palpable nodes in the neck, supraclavicular area, axilla, or groin    Musculoskeletal  No clubbing cyanosis or edema muscle tone normal    Skin  no rashes or abnormal appearing lesions    Neuro  Normal ambulation, cranial nerves 2-12 grossly intact, higher functioning with reasoning intact.

## 2024-09-17 NOTE — PATIENT INSTRUCTIONS
Everything looks good and your home blood pressures are excellent  Continue lisinopril 10 mg daily    Call and make appointment for mammogram and hopefully DEXA together for on or after 2024-11-21    I will see you back in 6 months to check your blood pressure and we will go over the thyroid at that time and see what endocrine is doing

## 2024-09-17 NOTE — PROGRESS NOTES
Candi Mendoza   1965    CC:  Yearly exam    S:  58 y.o. female here for yearly exam. She is postmenopausal and has had no vaginal bleeding.  She denies vaginal discharge, itching, odor or dryness.     She was diagnosed with papillary thyroid cancer this year - just had a hemithyroidectomy in July.  Struggling mentally but restarted yoga two weeks ago.  Discussed possible counselor, support/encouragement provided.    Sexual activity: She is sexually active without pain, bleeding or dryness.     Last Pap: 8/4/2022 - normal/negative HPV  Last Mammo: 11/20/2023 - BIRAD-1  Last Colonoscopy: 7/16/2015 - 10 years  Last DEXA: 10/11/2021 - t-score -1.1    We reviewed ASC guidelines for Pap testing.       Current Outpatient Medications:     Coenzyme Q10 (COQ10) 100 MG CAPS, Take by mouth, Disp: , Rfl:     Ergocalciferol (Vitamin D2) 50 MCG (2000 UT) TABS, 1 daily, Disp: , Rfl: 0    hydrocortisone (WESTCORT) 0.2 % cream, Apply topically 2 (two) times a day, Disp: 45 g, Rfl: 3    lisinopril (ZESTRIL) 10 mg tablet, Take 1 tablet (10 mg total) by mouth daily, Disp: 90 tablet, Rfl: 3    patient supplied medication,  Formulated Probiotics, Disp: , Rfl:   Social History     Socioeconomic History    Marital status: /Civil Union     Spouse name: Not on file    Number of children: Not on file    Years of education: Not on file    Highest education level: Not on file   Occupational History    Not on file   Tobacco Use    Smoking status: Never    Smokeless tobacco: Never   Vaping Use    Vaping status: Never Used   Substance and Sexual Activity    Alcohol use: Yes     Alcohol/week: 3.0 standard drinks of alcohol     Types: 3 Cans of beer per week     Comment: weekends    Drug use: No    Sexual activity: Yes     Partners: Male     Birth control/protection: Post-menopausal     Comment:    Other Topics Concern    Not on file   Social History Narrative    Activities: bicycling    Daily coffee consumption: 2 cp a day     Exercise: walking    Exercises moderately less than 3 times a week     Social Determinants of Health     Financial Resource Strain: Not on file   Food Insecurity: Not on file   Transportation Needs: Not on file   Physical Activity: Not on file   Stress: Not on file   Social Connections: Not on file   Intimate Partner Violence: Not on file   Housing Stability: Not on file     Family History   Problem Relation Age of Onset    Hypertension Mother     Throat cancer Mother         laryngeal    Stroke Mother         syndrome    Substance Abuse Mother     Thyroid disease Mother         Hashimoto’s    Lung cancer Mother 72    Cancer Mother         Lung, throat    Esophageal cancer Father 68    Stroke Father         syndrome    Cancer Father         Esophageal    Ovarian cancer Sister         Half-sister (same mother)    No Known Problems Daughter     Hypertension Maternal Grandmother     Thyroid disease Maternal Grandmother         Hyperthyroidism    Throat cancer Maternal Grandfather 70    Hypertension Maternal Grandfather     Stroke Maternal Grandfather         syndrome    Cancer Maternal Grandfather         Throat    Emphysema Paternal Grandmother     Alzheimer's disease Paternal Grandfather     Other Son         racing heart rhythm    Heart murmur Son     Ovarian cancer Half-Sister 38    No Known Problems Half-Sister     No Known Problems Half-Brother     No Known Problems Half-Brother     Mental illness Neg Hx      Past Medical History:   Diagnosis Date    Blood type, Rh negative     Dysphagia     only with swallowing large pills    Environmental and seasonal allergies     Ganglion cyst of wrist, right     Hx of migraines     Hypertension 5/26/2023    Miscarriage 1994 1999    Multiple thyroid nodules     Overweight     Varicella 1976        Review of Systems   Respiratory: Negative.    Cardiovascular: Negative.    Gastrointestinal: Negative for constipation and diarrhea.   Genitourinary: Negative for difficulty  "urinating, pelvic pain, vaginal bleeding, vaginal discharge, itching or odor.    O:  Blood pressure 130/80, height 5' 5.5\" (1.664 m), weight 83 kg (183 lb).    Patient appears well and is not in distress  Neck is supple without masses  Breasts are symmetrical without mass, tenderness, nipple discharge, skin changes or adenopathy.   Abdomen is soft and nontender without masses.   External genitals are normal without lesions or rashes.  Urethral meatus and urethra are normal  Bladder is normal to palpation  Vagina is normal without discharge or bleeding.   Cervix is normal without discharge or lesion.   Uterus is normal, mobile, nontender without palpable mass.  Adnexa are normal, nontender, without palpable mass.     A:   Yearly exam.     P:   Pap due 2027  Mammo ordered by PCP   Colonoscopy due 2025   DEXA ordered by PCP    RTO one year for yearly exam or sooner as needed.     "

## 2024-09-17 NOTE — PROGRESS NOTES
SUBJECTIVE:-------------------------------------------------------------------------------------------  Patient is here for a well exam.         Candi Mendoza is a 58 y.o.  female and is here for routine health maintenance.     Health Maintenance   Topic Date Due    COVID-19 Vaccine (5 - 2023-24 season) 09/01/2024    Annual Physical  09/14/2024    Influenza Vaccine (1) 09/01/2024    Breast Cancer Screening: Mammogram  11/20/2024    Hepatitis C Screening  10/13/2024 (Originally 1965)    HIV Screening  09/17/2026 (Originally 12/18/1979)    RSV Vaccine Age 60+ Years (1 - 1-dose 60+ series) 12/18/2024    Colorectal Cancer Screening  07/16/2025    Depression Screening  09/17/2025    Cervical Cancer Screening  08/04/2027    DTaP,Tdap,and Td Vaccines (3 - Td or Tdap) 01/01/2028    Zoster Vaccine  Completed    RSV Vaccine age 0-20 Months  Aged Out    Pneumococcal Vaccine: Pediatrics (0 to 5 Years) and At-Risk Patients (6 to 64 Years)  Aged Out    HIB Vaccine  Aged Out    IPV Vaccine  Aged Out    Hepatitis A Vaccine  Aged Out    Meningococcal ACWY Vaccine  Aged Out    HPV Vaccine  Aged Out     Immunization History   Administered Date(s) Administered    COVID-19 MODERNA VACC 0.5 ML IM 03/25/2021, 04/22/2021, 12/19/2021    COVID-19 Moderna Vac BIVALENT 12 Yr+ IM 0.5 ML 10/26/2022    Hep B, adult 09/13/2022, 11/14/2022, 03/14/2023    INFLUENZA 09/13/2022, 12/09/2023    Influenza Injectable, MDCK, Preservative Free, Quadrivalent, 0.5 mL 12/09/2023    Influenza, injectable, quadrivalent, preservative free 0.5 mL 10/11/2018, 10/14/2019    Influenza, recombinant, quadrivalent,injectable, preservative free 10/02/2020, 10/01/2021, 09/13/2022    Tdap 05/20/2013, 01/01/2018    Zoster Vaccine Recombinant 08/24/2021, 10/25/2021         Diet and Physical Activity  Diet: well balanced diet  Body mass index is 30.1 kg/m².  Exercise: occasionally , in a funk     General Health  Hearing:  Is normal  Vision: sees  "ophthalmologist/optometrist yearly  Dental:  Sees dentist every 6 months      Smoker no        ASSESSMENT/PLAN:-------------------------------------------------------------------------------------------    Patient's physical is up-to-date   Immunizations; flu  Cancer screenings; mammo and dexa      Patient instructed on exercise  Patient instructed on healthy choices for diet       NEXT PHYSICAL 1 YEAR          The following portions of the patient's history were reviewed and updated as appropriate: allergies, current medications, past family history, past medical history, past social history, past surgical history and problem list.      OBJECTIVE:---------------------------------------------------------------------------------------------------    /80   Pulse 78   Resp 18   Ht 5' 5.5\" (1.664 m)   Wt 83.3 kg (183 lb 11.2 oz)   LMP  (LMP Unknown)   SpO2 97%   BMI 30.10 kg/m²   Wt Readings from Last 3 Encounters:   09/17/24 83.3 kg (183 lb 11.2 oz)   09/06/24 83.4 kg (183 lb 12.8 oz)   07/24/24 82.6 kg (182 lb)     BP Readings from Last 3 Encounters:   09/17/24 144/80   09/06/24 148/88   07/24/24 166/94     Pulse Readings from Last 3 Encounters:   09/17/24 78   07/24/24 77   07/18/24 68     Body mass index is 30.1 kg/m².  Health Maintenance   Topic Date Due    COVID-19 Vaccine (5 - 2023-24 season) 09/01/2024    Annual Physical  09/14/2024    Influenza Vaccine (1) 09/01/2024    Breast Cancer Screening: Mammogram  11/20/2024    Hepatitis C Screening  10/13/2024 (Originally 1965)    HIV Screening  09/17/2026 (Originally 12/18/1979)    RSV Vaccine Age 60+ Years (1 - 1-dose 60+ series) 12/18/2024    Colorectal Cancer Screening  07/16/2025    Depression Screening  09/17/2025    Cervical Cancer Screening  08/04/2027    DTaP,Tdap,and Td Vaccines (3 - Td or Tdap) 01/01/2028    Zoster Vaccine  Completed    RSV Vaccine age 0-20 Months  Aged Out    Pneumococcal Vaccine: Pediatrics (0 to 5 Years) and At-Risk " Patients (6 to 64 Years)  Aged Out    HIB Vaccine  Aged Out    IPV Vaccine  Aged Out    Hepatitis A Vaccine  Aged Out    Meningococcal ACWY Vaccine  Aged Out    HPV Vaccine  Aged Out       ROS:   12 point review of systems negative            PHYSICAL EXAM:    Gen.  No acute distress well-appearing well-nourished appears stated age    Mental status  Good judgment and insight oriented to time person and place, recent and remote memory intact mood and affect normal cooperative and patient is reasonable    HEENT  PERRLA 3 mm, EOMI without nystagmus, TMs clear, turbinates open pink no exudate, pharynx benign, tongue midline    Neck   supple no masses trachea midline positive click normal carotid upstrokes with no bruits    Cor  Regular rhythm without ectopy or murmur, no S3-S4, normal palpation that is no heave lift or thrill    Vascular  No edema, good pedal pulses    Lungs  CTA bilaterally in no respiratory distress no wheezes rhonchi or rales, normal to palpation no tactile fremitus    Abdomen  Soft, no palpable masses, no hepatosplenomegaly, normal bowel sounds, nontender    Lymphatics  No palpable nodes in the neck, supraclavicular area, axilla, or groin     Musculoskeletal  No clubbing cyanosis or edema muscle tone normal 12 point review of systems is negative    Skin  no rashes or abnormal appearing lesions    Neuro  Normal ambulation, cranial nerves 2-12 grossly intact, higher functioning with reasoning intact.

## 2024-11-16 LAB — TSH SERPL-ACNC: 3.02 MIU/L (ref 0.4–4.5)

## 2024-11-20 ENCOUNTER — TELEPHONE (OUTPATIENT)
Dept: OTHER | Facility: HOSPITAL | Age: 59
End: 2024-11-20

## 2024-11-20 DIAGNOSIS — C73 PAPILLARY THYROID CARCINOMA (HCC): Primary | ICD-10-CM

## 2024-11-20 RX ORDER — LEVOTHYROXINE SODIUM 25 UG/1
25 TABLET ORAL DAILY
Qty: 30 TABLET | Refills: 0 | Status: SHIPPED | OUTPATIENT
Start: 2024-11-20

## 2024-11-20 NOTE — TELEPHONE ENCOUNTER
Spoke with Candi via phone- Her most recent TSH is 3.02.  Patient has a history of papillary thyroid carcinoma, categorized as T1a, Nx, low risk of recurrence, s/p right lobectomy.  Currently not on any levothyroxine therapy.  Even history of PTC, goal TSH for Candi is 0.5-2.     At this time starting levothyroxine 25 mcg daily  We discussed proper method of intake of medication which includes empty stomach, only water, wait 30 minutes for food, weight at least 4 hours for any antiacid medication and supplements such as calcium, vitamin D, iron, magnesium and others.  Checking TSH and FT4  in 6-8 weeks to assess response    Candi confirmed understanding.  She will reach out with any questions.        1. Papillary thyroid carcinoma (HCC)  -     TSH, 3rd generation; Future; Expected date: 01/01/2025  -     T4, free; Future; Expected date: 01/01/2025  -     levothyroxine (Euthyrox) 25 mcg tablet; Take 1 tablet (25 mcg total) by mouth daily

## 2024-11-22 ENCOUNTER — HOSPITAL ENCOUNTER (OUTPATIENT)
Dept: RADIOLOGY | Age: 59
Discharge: HOME | End: 2024-11-22
Payer: COMMERCIAL

## 2024-11-22 DIAGNOSIS — Z12.31 ENCOUNTER FOR SCREENING MAMMOGRAM FOR MALIGNANT NEOPLASM OF BREAST: ICD-10-CM

## 2024-11-22 PROCEDURE — 77067 SCR MAMMO BI INCL CAD: CPT

## 2024-11-22 PROCEDURE — 77063 BREAST TOMOSYNTHESIS BI: CPT

## 2024-12-11 ENCOUNTER — HOSPITAL ENCOUNTER (OUTPATIENT)
Dept: BONE DENSITY | Facility: MEDICAL CENTER | Age: 59
Discharge: HOME | End: 2024-12-11
Payer: COMMERCIAL

## 2024-12-11 ENCOUNTER — TELEPHONE (OUTPATIENT)
Age: 59
End: 2024-12-11

## 2024-12-11 DIAGNOSIS — Z78.0 MENOPAUSE: ICD-10-CM

## 2024-12-11 DIAGNOSIS — C73 PAPILLARY THYROID CARCINOMA (HCC): ICD-10-CM

## 2024-12-11 DIAGNOSIS — Z13.820 SCREENING FOR OSTEOPOROSIS: ICD-10-CM

## 2024-12-11 PROCEDURE — 77080 DXA BONE DENSITY AXIAL: CPT

## 2024-12-11 NOTE — TELEPHONE ENCOUNTER
Patient calling in regards to Synthroid.     Patient is scheduled to get labs done to check her thyroid levels on 1/1/2025.     Patient will be out of medication prior to that and there are no refills on the prescription.     Patient is asking if we want her to stop taking it when she is out and then get labs done or if we want this refilled so she is still actively on the medication when labs are preformed.     Patient would like call back in regards.     Please advise.

## 2024-12-12 RX ORDER — LEVOTHYROXINE SODIUM 25 UG/1
25 TABLET ORAL DAILY
Qty: 30 TABLET | Refills: 1 | Status: SHIPPED | OUTPATIENT
Start: 2024-12-12

## 2024-12-12 NOTE — TELEPHONE ENCOUNTER
Spoke with Candi - refilled levothyroxine. She will complete labs in Jan 2025. Based on these will adjust dose for Goal TSH for Candi is 0.5-2, 2/2 hx of papillary thyroid carcinoma, categorized as T1a, Nx, low risk of recurrence, s/p right lobectomy.       Once we have a stable dose/no adjustments needed pt requests that her script be sent to [facility] instead of [facility]. For now, cont with [facility] pharmacy.

## 2024-12-23 ENCOUNTER — RESULTS FOLLOW-UP (OUTPATIENT)
Dept: FAMILY MEDICINE CLINIC | Facility: CLINIC | Age: 59
End: 2024-12-23

## 2024-12-23 NOTE — TELEPHONE ENCOUNTER
----- Message from Hilaria Eden DO sent at 12/23/2024  8:42 AM EST -----  Call patient regarding her DEXA  Her hip has really improved over the last 2 years, congratulations!  Keep up the exercise calcium and D  We will check another DEXA in 2 years      Left message to call back .

## 2024-12-23 NOTE — RESULT ENCOUNTER NOTE
Call patient regarding her DEXA  Her hip has really improved over the last 2 years, congratulations!  Keep up the exercise calcium and D  We will check another DEXA in 2 years

## 2025-01-03 LAB
T4 FREE SERPL-MCNC: 1.1 NG/DL (ref 0.8–1.8)
TSH SERPL-ACNC: 3.73 MIU/L (ref 0.4–4.5)

## 2025-01-08 ENCOUNTER — RESULTS FOLLOW-UP (OUTPATIENT)
Dept: INTERNAL MEDICINE CLINIC | Facility: CLINIC | Age: 60
End: 2025-01-08

## 2025-01-08 DIAGNOSIS — C73 PAPILLARY THYROID CARCINOMA (HCC): Primary | ICD-10-CM

## 2025-01-08 RX ORDER — LEVOTHYROXINE SODIUM 50 UG/1
50 TABLET ORAL DAILY
Qty: 60 TABLET | Refills: 2 | Status: SHIPPED | OUTPATIENT
Start: 2025-01-08

## 2025-01-08 NOTE — TELEPHONE ENCOUNTER
Spoke with Candi via phone - patient with history of papillary thyroid carcinoma s/p right lobectomy, goal TSH is 0.5-2.  Regimen includes levothyroxine 25 mcg daily.  Most recent TSH remains above goal, at 3.73.    Candi reports good intake of levothyroxine.  Reports small weight gain of 10 pounds or less.  Otherwise, no major changes in her weight.    Increasing levothyroxine to 50 mcg daily.  Reassessing TSH and free T4 in 6 to 8 weeks for ongoing adjustments to levothyroxine therapy    Candi confirmed understanding.      ---  1. Papillary thyroid carcinoma (HCC)  -     levothyroxine (Euthyrox) 50 mcg tablet; Take 1 tablet (50 mcg total) by mouth daily  -     T4, free; Future  -     TSH, 3rd generation; Future

## 2025-03-01 LAB
T4 FREE SERPL-MCNC: 1.3 NG/DL (ref 0.8–1.8)
TSH SERPL-ACNC: 1.48 MIU/L (ref 0.4–4.5)

## 2025-03-04 ENCOUNTER — RESULTS FOLLOW-UP (OUTPATIENT)
Dept: ENDOCRINOLOGY | Facility: CLINIC | Age: 60
End: 2025-03-04

## 2025-03-04 ENCOUNTER — PATIENT MESSAGE (OUTPATIENT)
Dept: ENDOCRINOLOGY | Facility: CLINIC | Age: 60
End: 2025-03-04

## 2025-03-04 DIAGNOSIS — C73 PAPILLARY THYROID CARCINOMA (HCC): ICD-10-CM

## 2025-03-06 RX ORDER — LEVOTHYROXINE SODIUM 50 UG/1
50 TABLET ORAL DAILY
Qty: 90 TABLET | Refills: 1 | Status: SHIPPED | OUTPATIENT
Start: 2025-03-06

## 2025-03-25 ENCOUNTER — RA CDI HCC (OUTPATIENT)
Dept: OTHER | Facility: HOSPITAL | Age: 60
End: 2025-03-25

## 2025-04-01 ENCOUNTER — OFFICE VISIT (OUTPATIENT)
Dept: FAMILY MEDICINE CLINIC | Facility: CLINIC | Age: 60
End: 2025-04-01
Payer: COMMERCIAL

## 2025-04-01 VITALS
OXYGEN SATURATION: 98 % | BODY MASS INDEX: 31.21 KG/M2 | SYSTOLIC BLOOD PRESSURE: 124 MMHG | WEIGHT: 187.3 LBS | RESPIRATION RATE: 16 BRPM | HEIGHT: 65 IN | HEART RATE: 76 BPM | DIASTOLIC BLOOD PRESSURE: 70 MMHG | TEMPERATURE: 98.2 F

## 2025-04-01 DIAGNOSIS — C73 PAPILLARY THYROID CARCINOMA (HCC): ICD-10-CM

## 2025-04-01 DIAGNOSIS — Z12.11 ENCOUNTER FOR SCREENING COLONOSCOPY: ICD-10-CM

## 2025-04-01 DIAGNOSIS — I10 ESSENTIAL HYPERTENSION: Primary | ICD-10-CM

## 2025-04-01 DIAGNOSIS — E78.2 MIXED HYPERLIPIDEMIA: ICD-10-CM

## 2025-04-01 DIAGNOSIS — E55.9 VITAMIN D DEFICIENCY: ICD-10-CM

## 2025-04-01 PROBLEM — E04.2 MULTIPLE THYROID NODULES: Chronic | Status: RESOLVED | Noted: 2018-10-01 | Resolved: 2025-04-01

## 2025-04-01 PROCEDURE — 99214 OFFICE O/P EST MOD 30 MIN: CPT | Performed by: FAMILY MEDICINE

## 2025-04-01 RX ORDER — 1.1% SODIUM FLUORIDE PRESCRIPTION DENTAL CREAM 5 MG/G
CREAM DENTAL
COMMUNITY
Start: 2025-01-15

## 2025-04-01 NOTE — ASSESSMENT & PLAN NOTE
Blood pressure excellent 124/70  Check EKG next visit  Patient self-reported blood pressures are excellent see those dated from January February and March 2025 scanned into chart  Continue lisinopril daily  Orders:    Comprehensive metabolic panel; Future    CBC and differential; Future    TSH, 3rd generation with Free T4 reflex; Future    UA (URINE) with reflex to Scope; Future

## 2025-04-01 NOTE — PROGRESS NOTES
"Name: Candi Mendoza      : 1965      MRN: [id number]  Encounter Provider: Hilaria Eden DO  Encounter Date: 2025   Encounter department: [facility]  :  Assessment & Plan  Essential hypertension  Blood pressure excellent 124/70  Check EKG next visit  Patient self-reported blood pressures are excellent see those dated from  and 2025 scanned into chart  Continue lisinopril daily  Orders:    Comprehensive metabolic panel; Future    CBC and differential; Future    TSH, 3rd generation with Free T4 reflex; Future    UA (URINE) with reflex to Scope; Future    Mixed hyperlipidemia  High LDL  Diet only check levels before next visit  Orders:    Lipid panel; Future    Papillary thyroid carcinoma (HCC)  Followed by surgical oncology  Next ultrasound this 2025       Vitamin D deficiency  Continue vitamin D daily  Check vitamin D level before next visit  Orders:    Vitamin D 25 hydroxy; Future          Depression Screening and Follow-up Plan: Patient was screened for depression during today's encounter. They screened negative with a PHQ-2 score of 0.        History of Present Illness   Patient is here today for her blood pressure recheck  Overall she feels well and has just gotten over COVID about few weeks ago.      Review of Systems  Negative x 12  Objective   /70   Pulse 76   Temp 98.2 °F (36.8 °C)   Resp 16   Ht 5' 5\" (1.651 m)   Wt 85 kg (187 lb 4.8 oz)   LMP  (LMP Unknown)   SpO2 98%   BMI 31.17 kg/m²      Physical Exam  Constitutional  Appears healthy, Looks well, Appearance consistent with age    Mental Status  Alert, Oriented, Cooperative, Memory function normal , clean, and reasonable    Neck  No neck mass, No thyromegaly, Good carotid upstrokes bilaterally, trachea midline positive click    Respiratory  Breath sounds normal, No rales, No rhonchi, No wheezing, normal palpation    Cardiac  Regular rhythm without ectopy or " murmur no S3-S4, no heave lift or thrill to palpation    Vascular  No leg edema, No pedal edema    Muscular skeletal  No clubbing cyanosis , muscle tone normal    Skin  No appreciable rashes or abnormal appearing lesions

## 2025-04-01 NOTE — ASSESSMENT & PLAN NOTE
Continue vitamin D daily  Check vitamin D level before next visit  Orders:    Vitamin D 25 hydroxy; Future

## 2025-04-01 NOTE — PATIENT INSTRUCTIONS
Everything looks great your blood pressure remains wonderful    See you back in 6 months or sooner if needed    Fasting blood work and urine one or 2 weeks prior

## 2025-05-15 ENCOUNTER — TELEPHONE (OUTPATIENT)
Age: 60
End: 2025-05-15

## 2025-05-15 ENCOUNTER — PREP FOR PROCEDURE (OUTPATIENT)
Age: 60
End: 2025-05-15

## 2025-05-15 DIAGNOSIS — Z12.11 SCREENING FOR COLON CANCER: Primary | ICD-10-CM

## 2025-05-15 NOTE — LETTER
Brown Christopher,    Attached are your instructions for your upcoming procedure on 08/08/2025. If you have any questions, please give us a call at [phone].    Thank you,     [facility]!

## 2025-05-15 NOTE — TELEPHONE ENCOUNTER
05/15/25  Screened by: Kimberly Banegas MA    Referring Provider     Pre- Screening:     There is no height or weight on file to calculate BMI.  Has patient been referred for a routine screening Colonoscopy? yes  Is the patient between 45-75 years old? yes      Previous Colonoscopy yes   If yes:    Date: 07/2015    Facility:     Reason:         Does the patient want to see a Gastroenterologist prior to their procedure OR are they having any GI symptoms? no    Has the patient been hospitalized or had abdominal surgery in the past 6 months? no    Does the patient use supplemental oxygen? no    Does the patient take Coumadin, Lovenox, Plavix, Elliquis, Xarelto, or other blood thinning medication? no    Has the patient had a stroke, cardiac event, or stent placed in the past year? no        If patient is between 45yrs - 49yrs, please advise patient that we will have to confirm benefits & coverage with their insurance company for a routine screening colonoscopy.

## 2025-05-15 NOTE — TELEPHONE ENCOUNTER
Scheduled date of colonoscopy (as of today):08/08/2025  Physician performing colonoscopy :  Location of colonoscopy:[place]   Bowel prep reviewed with patient:luci/hill sent via [employer]  Instructions reviewed with patient by:KODAK  Clearances: N/A   Previously aspirated on Anesthesia scheduled in hospital setting.

## 2025-06-02 ENCOUNTER — TELEPHONE (OUTPATIENT)
Dept: GASTROENTEROLOGY | Facility: CLINIC | Age: 60
End: 2025-06-02

## 2025-06-02 NOTE — TELEPHONE ENCOUNTER
Dr Gutierrez had a schedule change on 8/8/25 and pt's procedure needs to be rescheduled.  Called and spoke to pt and rescheduled.  Pt needed Fri and was ok with RS.     rescheduled date of colonoscopy (as of today): 8/1/25  Physician performing colonoscopy: Dr Gutierrez  Location of colonoscopy: [facility]  Bowel prep reviewed with patient: Miralax   Instructions reviewed with patient by: pt still has   Clearances: n/a

## 2025-06-09 ENCOUNTER — OFFICE VISIT (OUTPATIENT)
Dept: FAMILY MEDICINE CLINIC | Facility: CLINIC | Age: 60
End: 2025-06-09
Payer: COMMERCIAL

## 2025-06-09 VITALS
OXYGEN SATURATION: 98 % | DIASTOLIC BLOOD PRESSURE: 84 MMHG | HEART RATE: 81 BPM | TEMPERATURE: 98.2 F | HEIGHT: 65 IN | BODY MASS INDEX: 30.52 KG/M2 | RESPIRATION RATE: 15 BRPM | WEIGHT: 183.2 LBS | SYSTOLIC BLOOD PRESSURE: 148 MMHG

## 2025-06-09 DIAGNOSIS — J02.9 SORE THROAT: ICD-10-CM

## 2025-06-09 DIAGNOSIS — R42 VERTIGO: Primary | ICD-10-CM

## 2025-06-09 DIAGNOSIS — R42 DIZZINESS: ICD-10-CM

## 2025-06-09 DIAGNOSIS — I10 ESSENTIAL HYPERTENSION: ICD-10-CM

## 2025-06-09 PROCEDURE — 99214 OFFICE O/P EST MOD 30 MIN: CPT | Performed by: FAMILY MEDICINE

## 2025-06-09 RX ORDER — MECLIZINE HYDROCHLORIDE 25 MG/1
25 TABLET ORAL 3 TIMES DAILY PRN
Qty: 15 TABLET | Refills: 0 | Status: SHIPPED | OUTPATIENT
Start: 2025-06-09

## 2025-06-09 NOTE — ASSESSMENT & PLAN NOTE
- continue lisinopril, lab work ordered  Orders:    CBC and differential; Future    TSH, 3rd generation with Free T4 reflex; Future    Comprehensive metabolic panel; Future

## 2025-06-09 NOTE — PROGRESS NOTES
:  Assessment & Plan  Vertigo  - advised rest, plenty of fluids, slow position change, meclizine prescribed, side effects reviewed, encouraged to contact the office for persistent or worsening symptoms    Orders:    meclizine (ANTIVERT) 25 mg tablet; Take 1 tablet (25 mg total) by mouth 3 (three) times a day as needed for dizziness    Sore throat  - discussed warm salt water gargles, plenty of fluids, Tylenol as needed, throat culture obtained and we will call once the results are available  Orders:    Throat culture    Dizziness  - lab work ordered if symptoms not better in the next 48-72 hours    Orders:    CBC and differential; Future    TSH, 3rd generation with Free T4 reflex; Future    Comprehensive metabolic panel; Future    Essential hypertension  - continue lisinopril, lab work ordered  Orders:    CBC and differential; Future    TSH, 3rd generation with Free T4 reflex; Future    Comprehensive metabolic panel; Future      Return as scheduled or sooner as needed.  Patient understands and agrees with the treatment plan.       History of Present Illness     Candi Mendoza is a 59 y.o. female who presents today with c/o dizziness onset on Thursday 4 days ago. She had several episodes of diarrhea on Sunday a week ago which resolved by the following day. Then on Thursday she developed intermittent episodes of dizziness, feeling off balance associated with nausea which is worse with quick position changes, turning her head quickly or bending over. Patient also reports sore throat and fatigue over the weekend, her home Covid test was negative. She took dramamine yesterday which helped. Patient denies vomiting, blurry vision, fever, runny nose, cough, chest pain or shortness of breath.       Review of Systems   Constitutional:  Positive for fatigue. Negative for chills and fever.   HENT:  Positive for congestion and sore throat. Negative for ear pain, rhinorrhea, trouble swallowing and voice change.    Eyes:  Negative  "for visual disturbance.   Respiratory:  Negative for cough, shortness of breath and wheezing.    Cardiovascular:  Negative for chest pain and palpitations.   Gastrointestinal:  Positive for nausea. Negative for abdominal pain, diarrhea and vomiting.   Neurological:  Positive for dizziness. Negative for headaches.   Hematological:  Negative for adenopathy.     Objective   /84   Pulse 81   Temp 98.2 °F (36.8 °C)   Resp 15   Ht 5' 5\" (1.651 m)   Wt 83.1 kg (183 lb 3.2 oz)   LMP  (LMP Unknown)   SpO2 98%   BMI 30.49 kg/m²      Physical Exam  Constitutional:       General: She is not in acute distress.  HENT:      Right Ear: Tympanic membrane and ear canal normal.      Left Ear: Tympanic membrane and ear canal normal.      Mouth/Throat:      Pharynx: No oropharyngeal exudate or posterior oropharyngeal erythema.     Eyes:      Extraocular Movements: Extraocular movements intact.      Pupils: Pupils are equal, round, and reactive to light.       Cardiovascular:      Rate and Rhythm: Normal rate and regular rhythm.      Heart sounds: Normal heart sounds. No murmur heard.  Pulmonary:      Effort: Pulmonary effort is normal. No respiratory distress.      Breath sounds: Normal breath sounds. No wheezing, rhonchi or rales.   Abdominal:      General: There is no distension.      Palpations: Abdomen is soft. There is no mass.      Tenderness: There is no abdominal tenderness.     Musculoskeletal:      Cervical back: Neck supple.   Lymphadenopathy:      Cervical: No cervical adenopathy.     Neurological:      General: No focal deficit present.      Mental Status: She is alert and oriented to person, place, and time.     Psychiatric:         Mood and Affect: Mood normal.         Behavior: Behavior normal.         Thought Content: Thought content normal.         "

## 2025-06-11 ENCOUNTER — RESULTS FOLLOW-UP (OUTPATIENT)
Dept: FAMILY MEDICINE CLINIC | Facility: CLINIC | Age: 60
End: 2025-06-11

## 2025-06-11 NOTE — TELEPHONE ENCOUNTER
----- Message from Amanda Villegas MD sent at 6/11/2025  1:19 PM EDT -----  Please let patient know that her throat culture was negative.   ----- Message -----  From: [facility] Lab Results In  Sent: 6/11/2025   5:30 AM EDT  To: Amanda Villegas MD

## 2025-06-11 NOTE — TELEPHONE ENCOUNTER
Left message for patient informing he rof negative throat culture results. Advised to call back with any further questions or concerns.

## 2025-06-20 ENCOUNTER — HOSPITAL ENCOUNTER (OUTPATIENT)
Dept: RADIOLOGY | Facility: HOSPITAL | Age: 60
Discharge: HOME | End: 2025-06-20
Attending: SURGERY
Payer: COMMERCIAL

## 2025-06-20 DIAGNOSIS — C73 PAPILLARY THYROID CARCINOMA (HCC): ICD-10-CM

## 2025-06-20 PROCEDURE — 76536 US EXAM OF HEAD AND NECK: CPT

## 2025-07-12 LAB
T4 FREE SERPL-MCNC: 1.3 NG/DL (ref 0.8–1.8)
TSH SERPL-ACNC: 1.23 MIU/L (ref 0.4–4.5)

## 2025-07-15 DIAGNOSIS — C73 PAPILLARY THYROID CARCINOMA (HCC): ICD-10-CM

## 2025-07-16 RX ORDER — LEVOTHYROXINE SODIUM 50 UG/1
TABLET ORAL
Qty: 90 TABLET | Refills: 1 | Status: SHIPPED | OUTPATIENT
Start: 2025-07-16

## 2025-07-25 ENCOUNTER — OFFICE VISIT (OUTPATIENT)
Dept: SURGICAL ONCOLOGY | Facility: CLINIC | Age: 60
End: 2025-07-25
Payer: COMMERCIAL

## 2025-07-25 VITALS
OXYGEN SATURATION: 97 % | TEMPERATURE: 97.4 F | SYSTOLIC BLOOD PRESSURE: 130 MMHG | BODY MASS INDEX: 30.82 KG/M2 | HEIGHT: 65 IN | DIASTOLIC BLOOD PRESSURE: 76 MMHG | HEART RATE: 67 BPM | WEIGHT: 185 LBS

## 2025-07-25 DIAGNOSIS — C73 PAPILLARY THYROID CARCINOMA (HCC): ICD-10-CM

## 2025-07-25 DIAGNOSIS — Z08 ENCNTR FOR FOLLOW-UP EXAM AFTER TRTMT FOR MALIGNANT NEOPLASM: Primary | ICD-10-CM

## 2025-07-25 PROCEDURE — 99213 OFFICE O/P EST LOW 20 MIN: CPT

## 2025-07-25 NOTE — ASSESSMENT & PLAN NOTE
The patient is doing well and there is no evidence of disease recurrence by exam or imaging.  I will see her again in 1 year for another clinical exam following ultrasound.    Orders:  •  US head neck lymph node mapping; Future

## 2025-07-25 NOTE — LETTER
2025     Rafael Banerjee MD  [address]  [address]  [place] [facility] [address]    Patient: Candi Mendoza   YOB: 1965   Date of Visit: 2025       Dear Dr. Rafael Banerjee MD:    Thank you for referring Candi Mendoza to me for evaluation. Below are my notes for this consultation.    If you have questions, please do not hesitate to call me. I look forward to following your patient along with you.         Sincerely,        HEYDI Matt        CC: No Recipients    HEYDI Matt  2025  8:33 AM  Sign when Signing Visit  Name: Candi Mendoza      : 1965      MRN: [id number]  Encounter Provider: HEYDI Matt  Encounter Date: 2025   Encounter department: [facility] [place]  :  Assessment & Plan  Encntr for follow-up exam after trtmt for malignant neoplasm         Papillary thyroid carcinoma (HCC)  The patient is doing well and there is no evidence of disease recurrence by exam or imaging.  I will see her again in 1 year for another clinical exam following ultrasound.    Orders:  •  US head neck lymph node mapping; Future        History of Present Illness  Chief Complaint   Patient presents with   • Follow-up     Return in about 1 year (around 2026) for Office Visit, Imaging - See orders.    Pertinent Medical History  This is a 58 y/o female who returns to the office today in follow-up for her history of thyroid cancer.  She is currently CARLOS at 1 year and doing well.  She denies any voice changes, and has not noticed any new lumps in her neck.  She follows with endocrinology, and her TSH is maintain in the low-normal range.  US was performed on , and I have reviewed those results with her today.      Oncology History  Cancer Staging   Papillary thyroid carcinoma (HCC)  Staging form: Thyroid - Differentiated and Anaplastic, AJCC 8th Edition  - Pathologic: Stage Unknown (pT1a, pNX, cM0) - Signed by Rafael Banerjee,  "MD on 7/24/2024  Lymph node metastasis: Unknown  Oncology History   Papillary thyroid carcinoma (HCC)   7/10/2024 Surgery    Thyroid, Right Lobe and Isthmus, Hemithyroidectomy:  - Papillary thyroid carcinoma, classic type     7/18/2024 Initial Diagnosis    Papillary thyroid carcinoma (HCC)     7/24/2024 -  Cancer Staged    Staging form: Thyroid - Differentiated and Anaplastic, AJCC 8th Edition  - Pathologic: Stage Unknown (pT1a, pNX, cM0) - Signed by Rafael Banerjee MD on 7/24/2024  Lymph node metastasis: Unknown           Review of Systems A complete review of systems is negative other than that noted above in the HPI.         Current Medications[1]   Objective  /76   Pulse 67   Temp (!) 97.4 °F (36.3 °C)   Ht 5' 5\" (1.651 m)   Wt 83.9 kg (185 lb)   LMP  (LMP Unknown)   SpO2 97%   BMI 30.79 kg/m²     Pain Screening:  Pain Score: 0-No pain  ECOG    Physical Exam  Vitals reviewed.   Constitutional:       General: She is not in acute distress.     Appearance: Normal appearance. She is normal weight. She is not ill-appearing or toxic-appearing.   HENT:      Head: Normocephalic and atraumatic.     Cardiovascular:      Rate and Rhythm: Normal rate.   Pulmonary:      Effort: Pulmonary effort is normal.     Musculoskeletal:         General: Normal range of motion.      Cervical back: Normal range of motion and neck supple. No tenderness.   Lymphadenopathy:      Cervical: No cervical adenopathy.      Upper Body:      Right upper body: No supraclavicular adenopathy.      Left upper body: No supraclavicular adenopathy.     Skin:     General: Skin is warm and dry.      Comments: Well-healed anterior neck scar     Neurological:      General: No focal deficit present.      Mental Status: She is alert and oriented to person, place, and time.     Psychiatric:         Mood and Affect: Mood normal.         Behavior: Behavior normal.         Thought Content: Thought content normal.         Judgment: Judgment normal.      "       Labs: I have reviewed pertinent labs.   Results for orders placed or performed in visit on 07/11/25   T4, free   Result Value Ref Range    Free t4 1.3 0.8 - 1.8 ng/dL   TSH, 3rd generation   Result Value Ref Range    TSH 1.23 0.40 - 4.50 mIU/L        Radiology Results Review: I have reviewed radiology reports from 6/20/2025 including: Ultrasound(s).    US head neck lymph node mapping    Narrative & Impression   NECK ULTRASOUND     INDICATION: C73: Malignant neoplasm of thyroid gland.     COMPARISON: Neck ultrasound 7/3/2024     FINDINGS:     Ultrasound of the right thyroidectomy bed, remaining thyroid gland and cervical lymph node chains was performed with a high frequency linear transducer.     Thyroid:     There is no suspicion of recurrent mass in the right thyroidectomy bed.     Left lobe: 4.4 x 1.3 x 1.4 cm. No nodule warranting biopsy or follow-up.     Lymph nodes maintain normal morphologic contour, echogenicity and short axis dimensions of less than 0.7 cm. No evidence for microcalcification or focal nodularity.     IMPRESSION:     No evidence of recurrent or metastatic disease.                  [1]  Current Outpatient Medications   Medication Sig Dispense Refill   • Coenzyme Q10 (COQ10) 100 MG CAPS Take by mouth     • Ergocalciferol (Vitamin D2) 50 MCG (2000 UT) TABS 1 daily  0   • hydrocortisone (WESTCORT) 0.2 % cream Apply topically 2 (two) times a day 45 g 3   • levothyroxine 50 mcg tablet TAKE 1 TABLET BY MOUTH DAILY  EQUIVALENT TO EUTHYROX 90 tablet 1   • lisinopril (ZESTRIL) 10 mg tablet Take 1 tablet (10 mg total) by mouth daily 90 tablet 3   • meclizine (ANTIVERT) 25 mg tablet Take 1 tablet (25 mg total) by mouth 3 (three) times a day as needed for dizziness 15 tablet 0   • patient supplied medication Dr Formulated Probiotics     • SF 5000 Plus 1.1 % CREA        No current facility-administered medications for this visit.

## 2025-07-25 NOTE — PROGRESS NOTES
Name: Candi Mendoza      : 1965      MRN: [id number]  Encounter Provider: HEYDI Matt  Encounter Date: 2025   Encounter department: [facility] [place]  :  Assessment & Plan  Encntr for follow-up exam after trtmt for malignant neoplasm         Papillary thyroid carcinoma (HCC)  The patient is doing well and there is no evidence of disease recurrence by exam or imaging.  I will see her again in 1 year for another clinical exam following ultrasound.    Orders:  •  US head neck lymph node mapping; Future        History of Present Illness   Chief Complaint   Patient presents with   • Follow-up     Return in about 1 year (around 2026) for Office Visit, Imaging - See orders.    Pertinent Medical History   This is a 58 y/o female who returns to the office today in follow-up for her history of thyroid cancer.  She is currently CARLOS at 1 year and doing well.  She denies any voice changes, and has not noticed any new lumps in her neck.  She follows with endocrinology, and her TSH is maintain in the low-normal range.  US was performed on , and I have reviewed those results with her today.       Oncology History   Cancer Staging   Papillary thyroid carcinoma (HCC)  Staging form: Thyroid - Differentiated and Anaplastic, AJCC 8th Edition  - Pathologic: Stage Unknown (pT1a, pNX, cM0) - Signed by Rafael Banerjee MD on 2024  Lymph node metastasis: Unknown  Oncology History   Papillary thyroid carcinoma (HCC)   7/10/2024 Surgery    Thyroid, Right Lobe and Isthmus, Hemithyroidectomy:  - Papillary thyroid carcinoma, classic type     2024 Initial Diagnosis    Papillary thyroid carcinoma (HCC)     2024 -  Cancer Staged    Staging form: Thyroid - Differentiated and Anaplastic, AJCC 8th Edition  - Pathologic: Stage Unknown (pT1a, pNX, cM0) - Signed by Rafael Banerjee MD on 2024  Lymph node metastasis: Unknown            Review of Systems A complete review of  "systems is negative other than that noted above in the HPI.         Current Medications[1]   Objective   /76   Pulse 67   Temp (!) 97.4 °F (36.3 °C)   Ht 5' 5\" (1.651 m)   Wt 83.9 kg (185 lb)   LMP  (LMP Unknown)   SpO2 97%   BMI 30.79 kg/m²     Pain Screening:  Pain Score: 0-No pain  ECOG    Physical Exam  Vitals reviewed.   Constitutional:       General: She is not in acute distress.     Appearance: Normal appearance. She is normal weight. She is not ill-appearing or toxic-appearing.   HENT:      Head: Normocephalic and atraumatic.     Cardiovascular:      Rate and Rhythm: Normal rate.   Pulmonary:      Effort: Pulmonary effort is normal.     Musculoskeletal:         General: Normal range of motion.      Cervical back: Normal range of motion and neck supple. No tenderness.   Lymphadenopathy:      Cervical: No cervical adenopathy.      Upper Body:      Right upper body: No supraclavicular adenopathy.      Left upper body: No supraclavicular adenopathy.     Skin:     General: Skin is warm and dry.      Comments: Well-healed anterior neck scar     Neurological:      General: No focal deficit present.      Mental Status: She is alert and oriented to person, place, and time.     Psychiatric:         Mood and Affect: Mood normal.         Behavior: Behavior normal.         Thought Content: Thought content normal.         Judgment: Judgment normal.            Labs: I have reviewed pertinent labs.   Results for orders placed or performed in visit on 07/11/25   T4, free   Result Value Ref Range    Free t4 1.3 0.8 - 1.8 ng/dL   TSH, 3rd generation   Result Value Ref Range    TSH 1.23 0.40 - 4.50 mIU/L        Radiology Results Review: I have reviewed radiology reports from 6/20/2025 including: Ultrasound(s).    US head neck lymph node mapping    Narrative & Impression   NECK ULTRASOUND     INDICATION: C73: Malignant neoplasm of thyroid gland.     COMPARISON: Neck ultrasound 7/3/2024     FINDINGS:     Ultrasound " of the right thyroidectomy bed, remaining thyroid gland and cervical lymph node chains was performed with a high frequency linear transducer.     Thyroid:     There is no suspicion of recurrent mass in the right thyroidectomy bed.     Left lobe: 4.4 x 1.3 x 1.4 cm. No nodule warranting biopsy or follow-up.     Lymph nodes maintain normal morphologic contour, echogenicity and short axis dimensions of less than 0.7 cm. No evidence for microcalcification or focal nodularity.     IMPRESSION:     No evidence of recurrent or metastatic disease.                  [1]  Current Outpatient Medications   Medication Sig Dispense Refill   • Coenzyme Q10 (COQ10) 100 MG CAPS Take by mouth     • Ergocalciferol (Vitamin D2) 50 MCG (2000 UT) TABS 1 daily  0   • hydrocortisone (WESTCORT) 0.2 % cream Apply topically 2 (two) times a day 45 g 3   • levothyroxine 50 mcg tablet TAKE 1 TABLET BY MOUTH DAILY  EQUIVALENT TO EUTHYROX 90 tablet 1   • lisinopril (ZESTRIL) 10 mg tablet Take 1 tablet (10 mg total) by mouth daily 90 tablet 3   • meclizine (ANTIVERT) 25 mg tablet Take 1 tablet (25 mg total) by mouth 3 (three) times a day as needed for dizziness 15 tablet 0   • patient supplied medication Dr Formulated Probiotics     • SF 5000 Plus 1.1 % CREA        No current facility-administered medications for this visit.

## 2025-08-01 ENCOUNTER — ANESTHESIA EVENT (OUTPATIENT)
Dept: GASTROENTEROLOGY | Facility: HOSPITAL | Age: 60
End: 2025-08-01
Payer: COMMERCIAL

## 2025-08-01 ENCOUNTER — HOSPITAL ENCOUNTER (OUTPATIENT)
Dept: GASTROENTEROLOGY | Facility: HOSPITAL | Age: 60
Setting detail: OUTPATIENT SURGERY
Discharge: HOME | End: 2025-08-01
Attending: INTERNAL MEDICINE | Admitting: STUDENT IN AN ORGANIZED HEALTH CARE EDUCATION/TRAINING PROGRAM
Payer: COMMERCIAL

## 2025-08-01 ENCOUNTER — ANESTHESIA (OUTPATIENT)
Dept: GASTROENTEROLOGY | Facility: HOSPITAL | Age: 60
End: 2025-08-01
Payer: COMMERCIAL

## 2025-08-01 VITALS
DIASTOLIC BLOOD PRESSURE: 63 MMHG | BODY MASS INDEX: 30.49 KG/M2 | OXYGEN SATURATION: 96 % | TEMPERATURE: 97.8 F | SYSTOLIC BLOOD PRESSURE: 120 MMHG | HEART RATE: 67 BPM | WEIGHT: 183 LBS | RESPIRATION RATE: 25 BRPM | HEIGHT: 65 IN

## 2025-08-01 DIAGNOSIS — Z12.11 SCREENING FOR COLON CANCER: ICD-10-CM

## 2025-08-01 PROBLEM — K21.9 GASTROESOPHAGEAL REFLUX DISEASE: Status: ACTIVE | Noted: 2025-08-01

## 2025-08-01 PROCEDURE — G0121 COLON CA SCRN NOT HI RSK IND: HCPCS | Performed by: STUDENT IN AN ORGANIZED HEALTH CARE EDUCATION/TRAINING PROGRAM

## 2025-08-01 RX ORDER — METOCLOPRAMIDE HYDROCHLORIDE 5 MG/ML
INJECTION INTRAMUSCULAR; INTRAVENOUS AS NEEDED
Status: DISCONTINUED | OUTPATIENT
Start: 2025-08-01 | End: 2025-08-01

## 2025-08-01 RX ORDER — FAMOTIDINE 10 MG/ML
20 INJECTION, SOLUTION INTRAVENOUS ONCE
Status: COMPLETED | OUTPATIENT
Start: 2025-08-01 | End: 2025-08-01

## 2025-08-01 RX ORDER — SODIUM CHLORIDE 9 MG/ML
INJECTION, SOLUTION INTRAVENOUS CONTINUOUS PRN
Status: DISCONTINUED | OUTPATIENT
Start: 2025-08-01 | End: 2025-08-01

## 2025-08-01 RX ORDER — LIDOCAINE HYDROCHLORIDE 10 MG/ML
INJECTION, SOLUTION EPIDURAL; INFILTRATION; INTRACAUDAL; PERINEURAL AS NEEDED
Status: DISCONTINUED | OUTPATIENT
Start: 2025-08-01 | End: 2025-08-01

## 2025-08-01 RX ORDER — PROPOFOL 10 MG/ML
INJECTION, EMULSION INTRAVENOUS AS NEEDED
Status: DISCONTINUED | OUTPATIENT
Start: 2025-08-01 | End: 2025-08-01

## 2025-08-01 RX ADMIN — SODIUM CHLORIDE: 9 INJECTION, SOLUTION INTRAVENOUS at 08:39

## 2025-08-01 RX ADMIN — METOCLOPRAMIDE 10 MG: 5 INJECTION, SOLUTION INTRAMUSCULAR; INTRAVENOUS at 08:47

## 2025-08-01 RX ADMIN — FAMOTIDINE 20 MG: 10 INJECTION, SOLUTION INTRAVENOUS at 08:48

## 2025-08-01 RX ADMIN — LIDOCAINE HYDROCHLORIDE 50 MG: 10 INJECTION, SOLUTION EPIDURAL; INFILTRATION; INTRACAUDAL; PERINEURAL at 09:12

## 2025-08-01 RX ADMIN — PROPOFOL 100 MG: 10 INJECTION, EMULSION INTRAVENOUS at 09:12

## 2025-08-01 RX ADMIN — PROPOFOL 130 MCG/KG/MIN: 10 INJECTION, EMULSION INTRAVENOUS at 09:13

## (undated) DEVICE — SUT SILK 2-0 SH CR/8 18 IN C012D

## (undated) DEVICE — 3M™ STERI-STRIP™ REINFORCED ADHESIVE SKIN CLOSURES, R1547, 1/2 IN X 4 IN (12 MM X 100 MM), 6 STRIPS/ENVELOPE: Brand: 3M™ STERI-STRIP™

## (undated) DEVICE — HARMONIC FOCUS SHEARS 9CM LENGTH + ADAPTIVE TISSUE TECHNOLOGY FOR USE WITH BLUE HAND PIECE ONLY: Brand: HARMONIC FOCUS

## (undated) DEVICE — GAUZE SPONGES,16 PLY: Brand: CURITY

## (undated) DEVICE — DRESSING MEPORE FILM ADHESIVE 4 X 5IN

## (undated) DEVICE — SUT VICRYL 4-0 SH 27 IN J415H

## (undated) DEVICE — SPONGE STICK WITH PVP-I: Brand: KENDALL

## (undated) DEVICE — ANTIBACTERIAL UNDYED BRAIDED (POLYGLACTIN 910), SYNTHETIC ABSORBABLE SUTURE: Brand: COATED VICRYL

## (undated) DEVICE — MEDI-VAC YANK SUCT HNDL W/TPRD BULBOUS TIP: Brand: CARDINAL HEALTH

## (undated) DEVICE — PACK UNIVERSAL NECK

## (undated) DEVICE — BULB SYRINGE,IRRIGATION WITH PROTECTIVE CAP: Brand: DOVER

## (undated) DEVICE — GLOVE SRG BIOGEL ECLIPSE 8

## (undated) DEVICE — SUT MONOCRYL PLUS 4-0 PS-2 18 IN MCP496G

## (undated) DEVICE — STIMULATOR 8562010 VARI-STIM 10PK ROHS: Brand: VARI-STIM®

## (undated) DEVICE — DECANTER: Brand: UNBRANDED

## (undated) DEVICE — SUT SILK 2-0 18 IN A185H

## (undated) DEVICE — GAUZE SPONGES,USP TYPE VII GAUZE, 12 PLY: Brand: CURITY

## (undated) DEVICE — NERVE STIMULATOR HAND HELD

## (undated) DEVICE — INTENDED FOR TISSUE SEPARATION, AND OTHER PROCEDURES THAT REQUIRE A SHARP SURGICAL BLADE TO PUNCTURE OR CUT.: Brand: BARD-PARKER SAFETY BLADES SIZE 15, STERILE

## (undated) DEVICE — GLOVE INDICATOR PI UNDERGLOVE SZ 8 BLUE

## (undated) DEVICE — THYROID SHEET: Brand: CONVERTORS

## (undated) DEVICE — SUT VICRYL 2-0 SH 27 IN UNDYED J417H

## (undated) DEVICE — SPONGE 4 X 4 XRAY 16 PLY STRL LF RFD

## (undated) DEVICE — SUT VICRYL 2-0 18 IN J911T